# Patient Record
Sex: FEMALE | Race: WHITE | Employment: OTHER | URBAN - NONMETROPOLITAN AREA
[De-identification: names, ages, dates, MRNs, and addresses within clinical notes are randomized per-mention and may not be internally consistent; named-entity substitution may affect disease eponyms.]

---

## 2017-01-20 DIAGNOSIS — N39.46 MIXED INCONTINENCE: ICD-10-CM

## 2017-01-22 RX ORDER — OXYBUTYNIN CHLORIDE 5 MG/1
TABLET ORAL
Qty: 90 TABLET | Refills: 0 | Status: SHIPPED | OUTPATIENT
Start: 2017-01-22 | End: 2017-02-22 | Stop reason: SDUPTHER

## 2017-01-24 ENCOUNTER — TELEPHONE (OUTPATIENT)
Dept: FAMILY MEDICINE CLINIC | Age: 72
End: 2017-01-24

## 2017-01-24 ENCOUNTER — NURSE ONLY (OUTPATIENT)
Dept: FAMILY MEDICINE CLINIC | Age: 72
End: 2017-01-24

## 2017-01-24 DIAGNOSIS — R39.9 UTI SYMPTOMS: Primary | ICD-10-CM

## 2017-01-24 DIAGNOSIS — Z13.220 LIPID SCREENING: ICD-10-CM

## 2017-01-24 DIAGNOSIS — R73.9 HYPERGLYCEMIA: ICD-10-CM

## 2017-01-24 DIAGNOSIS — R53.83 FATIGUE, UNSPECIFIED TYPE: ICD-10-CM

## 2017-01-24 DIAGNOSIS — Z13.21 ENCOUNTER FOR VITAMIN DEFICIENCY SCREENING: ICD-10-CM

## 2017-01-24 LAB
BILIRUBIN, POC: NEGATIVE
BLOOD URINE, POC: NORMAL
CLARITY, POC: NORMAL
COLOR, POC: YELLOW
GLUCOSE URINE, POC: NEGATIVE
KETONES, POC: NEGATIVE
LEUKOCYTE EST, POC: NEGATIVE
NITRITE, POC: NEGATIVE
PH, POC: 5.5
PROTEIN, POC: NEGATIVE
SPECIFIC GRAVITY, POC: 1.02
UROBILINOGEN, POC: 0.2

## 2017-01-24 PROCEDURE — 36415 COLL VENOUS BLD VENIPUNCTURE: CPT | Performed by: NURSE PRACTITIONER

## 2017-01-24 PROCEDURE — 81002 URINALYSIS NONAUTO W/O SCOPE: CPT | Performed by: NURSE PRACTITIONER

## 2017-01-25 LAB
A/G RATIO: 1.5 (ref 1.1–2.2)
ALBUMIN SERPL-MCNC: 4 G/DL (ref 3.4–5)
ALP BLD-CCNC: 118 U/L (ref 40–129)
ALT SERPL-CCNC: 10 U/L (ref 10–40)
ANION GAP SERPL CALCULATED.3IONS-SCNC: 13 MMOL/L (ref 3–16)
AST SERPL-CCNC: 14 U/L (ref 15–37)
BASOPHILS ABSOLUTE: 0 K/UL (ref 0–0.2)
BASOPHILS RELATIVE PERCENT: 0.4 %
BILIRUB SERPL-MCNC: 0.3 MG/DL (ref 0–1)
BUN BLDV-MCNC: 22 MG/DL (ref 7–20)
CALCIUM SERPL-MCNC: 9.5 MG/DL (ref 8.3–10.6)
CHLORIDE BLD-SCNC: 101 MMOL/L (ref 99–110)
CHOLESTEROL, TOTAL: 266 MG/DL (ref 0–199)
CO2: 24 MMOL/L (ref 21–32)
CREAT SERPL-MCNC: 0.8 MG/DL (ref 0.6–1.2)
EOSINOPHILS ABSOLUTE: 0.1 K/UL (ref 0–0.6)
EOSINOPHILS RELATIVE PERCENT: 1.9 %
ESTIMATED AVERAGE GLUCOSE: 137 MG/DL
FOLATE: 6.7 NG/ML (ref 4.78–24.2)
GFR AFRICAN AMERICAN: >60
GFR NON-AFRICAN AMERICAN: >60
GLOBULIN: 2.6 G/DL
GLUCOSE BLD-MCNC: 100 MG/DL (ref 70–99)
HBA1C MFR BLD: 6.4 %
HCT VFR BLD CALC: 33.4 % (ref 36–48)
HDLC SERPL-MCNC: 45 MG/DL (ref 40–60)
HEMOGLOBIN: 10.3 G/DL (ref 12–16)
LDL CHOLESTEROL CALCULATED: 179 MG/DL
LYMPHOCYTES ABSOLUTE: 2.6 K/UL (ref 1–5.1)
LYMPHOCYTES RELATIVE PERCENT: 39.8 %
MCH RBC QN AUTO: 23.5 PG (ref 26–34)
MCHC RBC AUTO-ENTMCNC: 30.8 G/DL (ref 31–36)
MCV RBC AUTO: 76.2 FL (ref 80–100)
MONOCYTES ABSOLUTE: 0.6 K/UL (ref 0–1.3)
MONOCYTES RELATIVE PERCENT: 9.1 %
NEUTROPHILS ABSOLUTE: 3.1 K/UL (ref 1.7–7.7)
NEUTROPHILS RELATIVE PERCENT: 48.8 %
PDW BLD-RTO: 17.3 % (ref 12.4–15.4)
PLATELET # BLD: 263 K/UL (ref 135–450)
PMV BLD AUTO: 8 FL (ref 5–10.5)
POTASSIUM SERPL-SCNC: 4.3 MMOL/L (ref 3.5–5.1)
RBC # BLD: 4.39 M/UL (ref 4–5.2)
SODIUM BLD-SCNC: 138 MMOL/L (ref 136–145)
TOTAL PROTEIN: 6.6 G/DL (ref 6.4–8.2)
TRIGL SERPL-MCNC: 208 MG/DL (ref 0–150)
TSH REFLEX: 1.17 UIU/ML (ref 0.27–4.2)
VITAMIN B-12: 470 PG/ML (ref 211–911)
VITAMIN D 25-HYDROXY: 21.9 NG/ML
VLDLC SERPL CALC-MCNC: 42 MG/DL
WBC # BLD: 6.4 K/UL (ref 4–11)

## 2017-01-26 DIAGNOSIS — E78.00 PURE HYPERCHOLESTEROLEMIA: ICD-10-CM

## 2017-01-26 DIAGNOSIS — E55.9 VITAMIN D DEFICIENCY: Primary | ICD-10-CM

## 2017-01-26 DIAGNOSIS — D64.9 ANEMIA, UNSPECIFIED TYPE: Primary | ICD-10-CM

## 2017-01-26 LAB
FERRITIN: 8.4 NG/ML (ref 15–150)
IRON SATURATION: 7 % (ref 15–50)
IRON: 30 UG/DL (ref 37–145)
TOTAL IRON BINDING CAPACITY: 437 UG/DL (ref 260–445)

## 2017-01-26 RX ORDER — SIMVASTATIN 20 MG
20 TABLET ORAL EVERY EVENING
Qty: 30 TABLET | Refills: 2 | Status: SHIPPED | OUTPATIENT
Start: 2017-01-26 | End: 2017-04-29 | Stop reason: SDUPTHER

## 2017-01-27 DIAGNOSIS — N39.0 URINARY TRACT INFECTION WITHOUT HEMATURIA, SITE UNSPECIFIED: Primary | ICD-10-CM

## 2017-01-27 LAB
ORGANISM: ABNORMAL
URINE CULTURE, ROUTINE: ABNORMAL
URINE CULTURE, ROUTINE: ABNORMAL

## 2017-01-27 RX ORDER — SULFAMETHOXAZOLE AND TRIMETHOPRIM 800; 160 MG/1; MG/1
TABLET ORAL
Qty: 10 TABLET | Refills: 0 | Status: SHIPPED | OUTPATIENT
Start: 2017-01-27 | End: 2017-06-12 | Stop reason: ALTCHOICE

## 2017-02-22 DIAGNOSIS — N39.46 MIXED INCONTINENCE: ICD-10-CM

## 2017-02-22 RX ORDER — OXYBUTYNIN CHLORIDE 5 MG/1
TABLET ORAL
Qty: 90 TABLET | Refills: 11 | Status: SHIPPED | OUTPATIENT
Start: 2017-02-22 | End: 2017-07-10 | Stop reason: SDUPTHER

## 2017-02-28 DIAGNOSIS — K21.9 GASTROESOPHAGEAL REFLUX DISEASE WITHOUT ESOPHAGITIS: ICD-10-CM

## 2017-02-28 RX ORDER — OMEPRAZOLE 40 MG/1
CAPSULE, DELAYED RELEASE ORAL
Qty: 30 CAPSULE | Refills: 5 | Status: SHIPPED | OUTPATIENT
Start: 2017-02-28 | End: 2017-07-10 | Stop reason: SDUPTHER

## 2017-04-29 DIAGNOSIS — E78.00 PURE HYPERCHOLESTEROLEMIA: ICD-10-CM

## 2017-04-29 DIAGNOSIS — F41.9 ANXIETY: ICD-10-CM

## 2017-05-01 RX ORDER — FLUOXETINE HYDROCHLORIDE 40 MG/1
CAPSULE ORAL
Qty: 30 CAPSULE | Refills: 0 | Status: SHIPPED | OUTPATIENT
Start: 2017-05-01 | End: 2017-05-03 | Stop reason: SDUPTHER

## 2017-05-01 RX ORDER — SIMVASTATIN 20 MG
TABLET ORAL
Qty: 30 TABLET | Refills: 0 | Status: SHIPPED | OUTPATIENT
Start: 2017-05-01 | End: 2017-05-03 | Stop reason: SDUPTHER

## 2017-05-03 DIAGNOSIS — E78.00 PURE HYPERCHOLESTEROLEMIA: ICD-10-CM

## 2017-05-03 DIAGNOSIS — F41.9 ANXIETY: ICD-10-CM

## 2017-05-03 RX ORDER — FLUOXETINE HYDROCHLORIDE 40 MG/1
CAPSULE ORAL
Qty: 30 CAPSULE | Refills: 0 | Status: SHIPPED | OUTPATIENT
Start: 2017-05-03 | End: 2017-05-05 | Stop reason: SDUPTHER

## 2017-05-03 RX ORDER — SIMVASTATIN 20 MG
TABLET ORAL
Qty: 30 TABLET | Refills: 0 | Status: SHIPPED | OUTPATIENT
Start: 2017-05-03 | End: 2017-05-05 | Stop reason: SDUPTHER

## 2017-05-05 DIAGNOSIS — E78.00 PURE HYPERCHOLESTEROLEMIA: ICD-10-CM

## 2017-05-05 DIAGNOSIS — F41.9 ANXIETY: ICD-10-CM

## 2017-05-05 RX ORDER — FLUOXETINE HYDROCHLORIDE 40 MG/1
40 CAPSULE ORAL DAILY
Qty: 30 CAPSULE | Refills: 0 | Status: SHIPPED | OUTPATIENT
Start: 2017-05-05 | End: 2017-07-11 | Stop reason: DRUGHIGH

## 2017-05-05 RX ORDER — SIMVASTATIN 20 MG
20 TABLET ORAL NIGHTLY
Qty: 30 TABLET | Refills: 0 | Status: SHIPPED | OUTPATIENT
Start: 2017-05-05 | End: 2017-07-10 | Stop reason: ALTCHOICE

## 2017-06-12 ENCOUNTER — OFFICE VISIT (OUTPATIENT)
Dept: FAMILY MEDICINE CLINIC | Age: 72
End: 2017-06-12

## 2017-06-12 VITALS
DIASTOLIC BLOOD PRESSURE: 76 MMHG | SYSTOLIC BLOOD PRESSURE: 130 MMHG | HEIGHT: 69 IN | RESPIRATION RATE: 12 BRPM | TEMPERATURE: 98.8 F | HEART RATE: 68 BPM | WEIGHT: 145 LBS | BODY MASS INDEX: 21.48 KG/M2

## 2017-06-12 DIAGNOSIS — I10 ESSENTIAL HYPERTENSION: Primary | ICD-10-CM

## 2017-06-12 DIAGNOSIS — N39.46 MIXED INCONTINENCE: ICD-10-CM

## 2017-06-12 DIAGNOSIS — M54.50 CHRONIC MIDLINE LOW BACK PAIN WITHOUT SCIATICA: ICD-10-CM

## 2017-06-12 DIAGNOSIS — R73.02 IMPAIRED GLUCOSE TOLERANCE: ICD-10-CM

## 2017-06-12 DIAGNOSIS — R30.0 DYSURIA: ICD-10-CM

## 2017-06-12 DIAGNOSIS — K21.9 GASTROESOPHAGEAL REFLUX DISEASE WITHOUT ESOPHAGITIS: ICD-10-CM

## 2017-06-12 DIAGNOSIS — E78.00 PURE HYPERCHOLESTEROLEMIA: ICD-10-CM

## 2017-06-12 DIAGNOSIS — F41.9 ANXIETY: ICD-10-CM

## 2017-06-12 DIAGNOSIS — D50.8 OTHER IRON DEFICIENCY ANEMIA: ICD-10-CM

## 2017-06-12 DIAGNOSIS — G89.29 CHRONIC MIDLINE LOW BACK PAIN WITHOUT SCIATICA: ICD-10-CM

## 2017-06-12 DIAGNOSIS — I48.0 PAROXYSMAL ATRIAL FIBRILLATION (HCC): ICD-10-CM

## 2017-06-12 LAB
BILIRUBIN, POC: ABNORMAL
BLOOD URINE, POC: ABNORMAL
CLARITY, POC: ABNORMAL
COLOR, POC: YELLOW
GLUCOSE URINE, POC: ABNORMAL
KETONES, POC: ABNORMAL
LEUKOCYTE EST, POC: ABNORMAL
NITRITE, POC: POSITIVE
PH, POC: 5.5
PROTEIN, POC: 100
SPECIFIC GRAVITY, POC: >1.03
UROBILINOGEN, POC: 0.2

## 2017-06-12 PROCEDURE — 4040F PNEUMOC VAC/ADMIN/RCVD: CPT | Performed by: FAMILY MEDICINE

## 2017-06-12 PROCEDURE — 1036F TOBACCO NON-USER: CPT | Performed by: FAMILY MEDICINE

## 2017-06-12 PROCEDURE — 3014F SCREEN MAMMO DOC REV: CPT | Performed by: FAMILY MEDICINE

## 2017-06-12 PROCEDURE — 1123F ACP DISCUSS/DSCN MKR DOCD: CPT | Performed by: FAMILY MEDICINE

## 2017-06-12 PROCEDURE — 81002 URINALYSIS NONAUTO W/O SCOPE: CPT | Performed by: FAMILY MEDICINE

## 2017-06-12 PROCEDURE — 3017F COLORECTAL CA SCREEN DOC REV: CPT | Performed by: FAMILY MEDICINE

## 2017-06-12 PROCEDURE — G8420 CALC BMI NORM PARAMETERS: HCPCS | Performed by: FAMILY MEDICINE

## 2017-06-12 PROCEDURE — G8427 DOCREV CUR MEDS BY ELIG CLIN: HCPCS | Performed by: FAMILY MEDICINE

## 2017-06-12 PROCEDURE — 36415 COLL VENOUS BLD VENIPUNCTURE: CPT | Performed by: FAMILY MEDICINE

## 2017-06-12 PROCEDURE — G8400 PT W/DXA NO RESULTS DOC: HCPCS | Performed by: FAMILY MEDICINE

## 2017-06-12 PROCEDURE — 0509F URINE INCON PLAN DOCD: CPT | Performed by: FAMILY MEDICINE

## 2017-06-12 PROCEDURE — 99215 OFFICE O/P EST HI 40 MIN: CPT | Performed by: FAMILY MEDICINE

## 2017-06-12 PROCEDURE — 1090F PRES/ABSN URINE INCON ASSESS: CPT | Performed by: FAMILY MEDICINE

## 2017-06-12 RX ORDER — APIXABAN 5 MG/1
5 TABLET, FILM COATED ORAL 2 TIMES DAILY
Qty: 60 TABLET | Refills: 5 | Status: SHIPPED | OUTPATIENT
Start: 2017-06-12

## 2017-06-12 RX ORDER — TRAMADOL HYDROCHLORIDE 50 MG/1
50 TABLET ORAL EVERY 8 HOURS PRN
Qty: 20 TABLET | Refills: 0 | Status: SHIPPED | OUTPATIENT
Start: 2017-06-12 | End: 2017-07-10 | Stop reason: SDUPTHER

## 2017-06-12 RX ORDER — FLUOXETINE HYDROCHLORIDE 40 MG/1
40 CAPSULE ORAL DAILY
Qty: 30 CAPSULE | Refills: 0 | Status: CANCELLED | OUTPATIENT
Start: 2017-06-12

## 2017-06-12 RX ORDER — SIMVASTATIN 20 MG
20 TABLET ORAL NIGHTLY
Qty: 30 TABLET | Refills: 0 | Status: CANCELLED | OUTPATIENT
Start: 2017-06-12

## 2017-06-12 RX ORDER — OMEPRAZOLE 40 MG/1
CAPSULE, DELAYED RELEASE ORAL
Qty: 30 CAPSULE | Refills: 5 | Status: CANCELLED | OUTPATIENT
Start: 2017-06-12

## 2017-06-12 RX ORDER — CIPROFLOXACIN 250 MG/1
250 TABLET, FILM COATED ORAL 2 TIMES DAILY
Qty: 6 TABLET | Refills: 0 | Status: SHIPPED | OUTPATIENT
Start: 2017-06-12 | End: 2017-06-22

## 2017-06-12 RX ORDER — OXYBUTYNIN CHLORIDE 5 MG/1
TABLET ORAL
Qty: 90 TABLET | Refills: 11 | Status: CANCELLED | OUTPATIENT
Start: 2017-06-12

## 2017-06-13 ENCOUNTER — NURSE ONLY (OUTPATIENT)
Dept: FAMILY MEDICINE CLINIC | Age: 72
End: 2017-06-13

## 2017-06-13 DIAGNOSIS — R30.0 DYSURIA: ICD-10-CM

## 2017-06-13 DIAGNOSIS — N39.46 MIXED INCONTINENCE: ICD-10-CM

## 2017-06-13 LAB
A/G RATIO: 1.5 (ref 1.1–2.2)
ALBUMIN SERPL-MCNC: 4.2 G/DL (ref 3.4–5)
ALP BLD-CCNC: 130 U/L (ref 40–129)
ALT SERPL-CCNC: 14 U/L (ref 10–40)
ANION GAP SERPL CALCULATED.3IONS-SCNC: 17 MMOL/L (ref 3–16)
AST SERPL-CCNC: 17 U/L (ref 15–37)
BILIRUB SERPL-MCNC: 0.8 MG/DL (ref 0–1)
BUN BLDV-MCNC: 20 MG/DL (ref 7–20)
CALCIUM SERPL-MCNC: 9.6 MG/DL (ref 8.3–10.6)
CHLORIDE BLD-SCNC: 93 MMOL/L (ref 99–110)
CHOLESTEROL, TOTAL: 213 MG/DL (ref 0–199)
CO2: 24 MMOL/L (ref 21–32)
CREAT SERPL-MCNC: 1.5 MG/DL (ref 0.6–1.2)
ESTIMATED AVERAGE GLUCOSE: 137 MG/DL
GFR AFRICAN AMERICAN: 42
GFR NON-AFRICAN AMERICAN: 34
GLOBULIN: 2.8 G/DL
GLUCOSE BLD-MCNC: 116 MG/DL (ref 70–99)
HBA1C MFR BLD: 6.4 %
HCT VFR BLD CALC: 40.9 % (ref 36–48)
HDLC SERPL-MCNC: 48 MG/DL (ref 40–60)
HEMOGLOBIN: 13.2 G/DL (ref 12–16)
LDL CHOLESTEROL CALCULATED: 129 MG/DL
MCH RBC QN AUTO: 29.4 PG (ref 26–34)
MCHC RBC AUTO-ENTMCNC: 32.2 G/DL (ref 31–36)
MCV RBC AUTO: 91.2 FL (ref 80–100)
PDW BLD-RTO: 14.2 % (ref 12.4–15.4)
PLATELET # BLD: 221 K/UL (ref 135–450)
PMV BLD AUTO: 8.7 FL (ref 5–10.5)
POTASSIUM SERPL-SCNC: 4.6 MMOL/L (ref 3.5–5.1)
RBC # BLD: 4.49 M/UL (ref 4–5.2)
SODIUM BLD-SCNC: 134 MMOL/L (ref 136–145)
TOTAL PROTEIN: 7 G/DL (ref 6.4–8.2)
TRIGL SERPL-MCNC: 178 MG/DL (ref 0–150)
VLDLC SERPL CALC-MCNC: 36 MG/DL
WBC # BLD: 9.4 K/UL (ref 4–11)

## 2017-06-15 LAB
ORGANISM: ABNORMAL
URINE CULTURE, ROUTINE: ABNORMAL

## 2017-06-18 ASSESSMENT — ENCOUNTER SYMPTOMS
RESPIRATORY NEGATIVE: 1
GASTROINTESTINAL NEGATIVE: 1

## 2017-06-28 ENCOUNTER — TELEPHONE (OUTPATIENT)
Dept: FAMILY MEDICINE CLINIC | Age: 72
End: 2017-06-28

## 2017-07-10 ENCOUNTER — OFFICE VISIT (OUTPATIENT)
Dept: FAMILY MEDICINE CLINIC | Age: 72
End: 2017-07-10

## 2017-07-10 VITALS
WEIGHT: 142 LBS | BODY MASS INDEX: 20.97 KG/M2 | SYSTOLIC BLOOD PRESSURE: 132 MMHG | OXYGEN SATURATION: 97 % | HEART RATE: 72 BPM | DIASTOLIC BLOOD PRESSURE: 84 MMHG

## 2017-07-10 DIAGNOSIS — K52.9 CHRONIC DIARRHEA: ICD-10-CM

## 2017-07-10 DIAGNOSIS — M54.50 CHRONIC MIDLINE LOW BACK PAIN WITHOUT SCIATICA: ICD-10-CM

## 2017-07-10 DIAGNOSIS — E78.00 PURE HYPERCHOLESTEROLEMIA: ICD-10-CM

## 2017-07-10 DIAGNOSIS — F41.9 ANXIETY: ICD-10-CM

## 2017-07-10 DIAGNOSIS — N39.46 MIXED INCONTINENCE: ICD-10-CM

## 2017-07-10 DIAGNOSIS — K21.9 GASTROESOPHAGEAL REFLUX DISEASE WITHOUT ESOPHAGITIS: ICD-10-CM

## 2017-07-10 DIAGNOSIS — G89.29 CHRONIC MIDLINE LOW BACK PAIN WITHOUT SCIATICA: ICD-10-CM

## 2017-07-10 DIAGNOSIS — D50.8 OTHER IRON DEFICIENCY ANEMIA: Primary | ICD-10-CM

## 2017-07-10 PROCEDURE — 3014F SCREEN MAMMO DOC REV: CPT | Performed by: FAMILY MEDICINE

## 2017-07-10 PROCEDURE — 99214 OFFICE O/P EST MOD 30 MIN: CPT | Performed by: FAMILY MEDICINE

## 2017-07-10 PROCEDURE — 0509F URINE INCON PLAN DOCD: CPT | Performed by: FAMILY MEDICINE

## 2017-07-10 PROCEDURE — 1036F TOBACCO NON-USER: CPT | Performed by: FAMILY MEDICINE

## 2017-07-10 PROCEDURE — 3017F COLORECTAL CA SCREEN DOC REV: CPT | Performed by: FAMILY MEDICINE

## 2017-07-10 PROCEDURE — G8400 PT W/DXA NO RESULTS DOC: HCPCS | Performed by: FAMILY MEDICINE

## 2017-07-10 PROCEDURE — 1090F PRES/ABSN URINE INCON ASSESS: CPT | Performed by: FAMILY MEDICINE

## 2017-07-10 PROCEDURE — G8420 CALC BMI NORM PARAMETERS: HCPCS | Performed by: FAMILY MEDICINE

## 2017-07-10 PROCEDURE — 1123F ACP DISCUSS/DSCN MKR DOCD: CPT | Performed by: FAMILY MEDICINE

## 2017-07-10 PROCEDURE — G8427 DOCREV CUR MEDS BY ELIG CLIN: HCPCS | Performed by: FAMILY MEDICINE

## 2017-07-10 PROCEDURE — 4040F PNEUMOC VAC/ADMIN/RCVD: CPT | Performed by: FAMILY MEDICINE

## 2017-07-10 RX ORDER — TRAMADOL HYDROCHLORIDE 50 MG/1
50 TABLET ORAL EVERY 8 HOURS PRN
Qty: 30 TABLET | Refills: 0 | Status: SHIPPED | OUTPATIENT
Start: 2017-07-10 | End: 2017-09-23 | Stop reason: SDUPTHER

## 2017-07-10 RX ORDER — FLUOXETINE HYDROCHLORIDE 40 MG/1
40 CAPSULE ORAL DAILY
Qty: 30 CAPSULE | Refills: 5 | Status: CANCELLED | OUTPATIENT
Start: 2017-07-10

## 2017-07-10 RX ORDER — ATORVASTATIN CALCIUM 40 MG/1
40 TABLET, FILM COATED ORAL DAILY
Qty: 30 TABLET | Refills: 5 | Status: SHIPPED | OUTPATIENT
Start: 2017-07-10 | End: 2018-01-19 | Stop reason: SDUPTHER

## 2017-07-11 RX ORDER — OXYBUTYNIN CHLORIDE 5 MG/1
TABLET ORAL
Qty: 90 TABLET | Refills: 5 | Status: SHIPPED | OUTPATIENT
Start: 2017-07-11 | End: 2018-03-19 | Stop reason: SDUPTHER

## 2017-07-11 RX ORDER — OMEPRAZOLE 40 MG/1
CAPSULE, DELAYED RELEASE ORAL
Qty: 30 CAPSULE | Refills: 5 | Status: SHIPPED | OUTPATIENT
Start: 2017-07-11 | End: 2017-08-16 | Stop reason: ALTCHOICE

## 2017-07-11 RX ORDER — FLUOXETINE HYDROCHLORIDE 40 MG/1
40 CAPSULE ORAL DAILY
Qty: 30 CAPSULE | Refills: 5 | Status: SHIPPED | OUTPATIENT
Start: 2017-07-11 | End: 2018-02-05 | Stop reason: SDUPTHER

## 2017-07-12 ENCOUNTER — NURSE ONLY (OUTPATIENT)
Dept: FAMILY MEDICINE CLINIC | Age: 72
End: 2017-07-12

## 2017-07-12 DIAGNOSIS — K52.9 CHRONIC DIARRHEA OF UNKNOWN ORIGIN: Primary | ICD-10-CM

## 2017-07-12 LAB
6-ACETYLMORPHINE: NOT DETECTED
7-AMINOCLONAZEPAM: NOT DETECTED
ALPHA-OH-ALPRAZOLAM: NOT DETECTED
ALPRAZOLAM: NOT DETECTED
AMPHETAMINE: NOT DETECTED
BARBITURATES: NOT DETECTED
BENZOYLECGONINE: NOT DETECTED
BUPRENORPHINE: NOT DETECTED
CARISOPRODOL: NOT DETECTED
CLONAZEPAM: NOT DETECTED
CODEINE: NOT DETECTED
CREATININE URINE: 60.6 MG/DL (ref 20–400)
DIAZEPAM: NOT DETECTED
EER PAIN MGT DRUG PANEL, HIGH RES/EMIT U: NORMAL
ETHYL GLUCURONIDE: NOT DETECTED
FENTANYL: NOT DETECTED
GLIADIN ANTIBODIES IGA: 6 UNITS (ref 0–19)
GLIADIN ANTIBODIES IGG: 5 UNITS (ref 0–19)
HYDROCODONE: NOT DETECTED
HYDROMORPHONE: NOT DETECTED
LORAZEPAM: NOT DETECTED
MARIJUANA METABOLITE: NOT DETECTED
MDA: NOT DETECTED
MDEA: NOT DETECTED
MDMA URINE: NOT DETECTED
MEPERIDINE: NOT DETECTED
METHADONE: NOT DETECTED
METHAMPHETAMINE: NOT DETECTED
METHYLPHENIDATE: NOT DETECTED
MIDAZOLAM: NOT DETECTED
MORPHINE: NOT DETECTED
NORBUPRENORPHINE, FREE: NOT DETECTED
NORDIAZEPAM: NOT DETECTED
NORFENTANYL: NOT DETECTED
NORHYDROCODONE, URINE: NOT DETECTED
NOROXYCODONE: NOT DETECTED
NOROXYMORPHONE, URINE: NOT DETECTED
OCCULT BLOOD DIAGNOSTIC: NORMAL
OXAZEPAM: NOT DETECTED
OXYCODONE: NOT DETECTED
OXYMORPHONE: NOT DETECTED
PAIN MANAGEMENT DRUG PANEL: NORMAL
PCP: NOT DETECTED
PHENTERMINE: NOT DETECTED
PROPOXYPHENE: NOT DETECTED
TAPENTADOL, URINE: NOT DETECTED
TAPENTADOL-O-SULFATE, URINE: NOT DETECTED
TEMAZEPAM: NOT DETECTED
TRAMADOL: PRESENT
ZOLPIDEM: NOT DETECTED

## 2017-07-13 LAB
C DIFFICILE TOXIN, EIA: NORMAL
CRYPTOSPORIDIUM ANTIGEN STOOL: NORMAL
E HISTOLYTICA ANTIGEN STOOL: NORMAL
GIARDIA ANTIGEN STOOL: NORMAL

## 2017-07-14 LAB — CALPROTECTIN: <16 UG/G

## 2017-07-21 ENCOUNTER — HOSPITAL ENCOUNTER (OUTPATIENT)
Dept: MAMMOGRAPHY | Age: 72
Discharge: HOME | End: 2017-07-21
Attending: FAMILY MEDICINE | Admitting: FAMILY MEDICINE

## 2017-07-21 DIAGNOSIS — Z12.31 VISIT FOR SCREENING MAMMOGRAM: ICD-10-CM

## 2017-07-24 ENCOUNTER — TELEPHONE (OUTPATIENT)
Dept: FAMILY MEDICINE CLINIC | Age: 72
End: 2017-07-24

## 2017-07-25 ENCOUNTER — TELEPHONE (OUTPATIENT)
Dept: FAMILY MEDICINE CLINIC | Age: 72
End: 2017-07-25

## 2017-07-25 DIAGNOSIS — R92.8 ABNORMAL MAMMOGRAM OF LEFT BREAST: Primary | ICD-10-CM

## 2017-07-26 ENCOUNTER — TELEPHONE (OUTPATIENT)
Dept: FAMILY MEDICINE CLINIC | Age: 72
End: 2017-07-26

## 2017-07-26 DIAGNOSIS — R92.8 ABNORMAL MAMMOGRAM OF LEFT BREAST: Primary | ICD-10-CM

## 2017-08-01 DIAGNOSIS — R92.8 ABNORMALITY OF LEFT BREAST ON SCREENING MAMMOGRAM: Primary | ICD-10-CM

## 2017-08-04 ENCOUNTER — HOSPITAL ENCOUNTER (OUTPATIENT)
Dept: ULTRASOUND IMAGING | Age: 72
Discharge: HOME | End: 2017-08-04
Attending: FAMILY MEDICINE | Admitting: FAMILY MEDICINE

## 2017-08-04 DIAGNOSIS — R92.8 ABNORMALITY OF LEFT BREAST ON SCREENING MAMMOGRAM: ICD-10-CM

## 2017-08-04 DIAGNOSIS — R92.8 OTHER ABNORMAL AND INCONCLUSIVE FINDINGS ON DIAGNOSTIC IMAGING OF BREAST: ICD-10-CM

## 2017-08-16 ENCOUNTER — HOSPITAL ENCOUNTER (OUTPATIENT)
Dept: OTHER | Age: 72
Discharge: HOME | End: 2017-08-16
Attending: INTERNAL MEDICINE | Admitting: INTERNAL MEDICINE

## 2017-08-16 VITALS
OXYGEN SATURATION: 96 % | SYSTOLIC BLOOD PRESSURE: 130 MMHG | HEART RATE: 65 BPM | DIASTOLIC BLOOD PRESSURE: 72 MMHG | RESPIRATION RATE: 16 BRPM | HEIGHT: 70 IN | WEIGHT: 145 LBS | TEMPERATURE: 97.9 F | BODY MASS INDEX: 20.76 KG/M2

## 2017-08-16 RX ORDER — SODIUM CHLORIDE, SODIUM LACTATE, POTASSIUM CHLORIDE, CALCIUM CHLORIDE 600; 310; 30; 20 MG/100ML; MG/100ML; MG/100ML; MG/100ML
INJECTION, SOLUTION INTRAVENOUS CONTINUOUS
Status: DISCONTINUED | OUTPATIENT
Start: 2017-08-16 | End: 2017-08-17 | Stop reason: HOSPADM

## 2017-08-16 RX ORDER — PANTOPRAZOLE SODIUM 40 MG/1
40 TABLET, DELAYED RELEASE ORAL DAILY
COMMUNITY
End: 2017-10-02 | Stop reason: ALTCHOICE

## 2017-08-16 RX ADMIN — SODIUM CHLORIDE, SODIUM LACTATE, POTASSIUM CHLORIDE, CALCIUM CHLORIDE: 600; 310; 30; 20 INJECTION, SOLUTION INTRAVENOUS at 08:12

## 2017-08-16 ASSESSMENT — PAIN SCALES - GENERAL
PAINLEVEL_OUTOF10: 0
PAINLEVEL_OUTOF10: 0

## 2017-08-16 ASSESSMENT — PAIN - FUNCTIONAL ASSESSMENT: PAIN_FUNCTIONAL_ASSESSMENT: 0-10

## 2017-09-23 DIAGNOSIS — G89.29 CHRONIC MIDLINE LOW BACK PAIN WITHOUT SCIATICA: ICD-10-CM

## 2017-09-23 DIAGNOSIS — M54.50 CHRONIC MIDLINE LOW BACK PAIN WITHOUT SCIATICA: ICD-10-CM

## 2017-09-26 RX ORDER — TRAMADOL HYDROCHLORIDE 50 MG/1
TABLET ORAL
Qty: 30 TABLET | Refills: 0 | Status: SHIPPED | OUTPATIENT
Start: 2017-09-26 | End: 2018-01-26 | Stop reason: SDUPTHER

## 2017-10-02 ENCOUNTER — OFFICE VISIT (OUTPATIENT)
Dept: FAMILY MEDICINE CLINIC | Age: 72
End: 2017-10-02

## 2017-10-02 VITALS
HEART RATE: 82 BPM | BODY MASS INDEX: 20.44 KG/M2 | TEMPERATURE: 98.1 F | DIASTOLIC BLOOD PRESSURE: 56 MMHG | WEIGHT: 138 LBS | HEIGHT: 69 IN | OXYGEN SATURATION: 96 % | SYSTOLIC BLOOD PRESSURE: 120 MMHG

## 2017-10-02 DIAGNOSIS — K21.9 GASTROESOPHAGEAL REFLUX DISEASE WITHOUT ESOPHAGITIS: ICD-10-CM

## 2017-10-02 DIAGNOSIS — D50.9 IRON DEFICIENCY ANEMIA, UNSPECIFIED IRON DEFICIENCY ANEMIA TYPE: ICD-10-CM

## 2017-10-02 DIAGNOSIS — M51.36 DEGENERATIVE DISC DISEASE, LUMBAR: Primary | ICD-10-CM

## 2017-10-02 LAB
HCT VFR BLD CALC: 39.7 % (ref 36–48)
HEMOGLOBIN: 13.1 G/DL (ref 12–16)
MCH RBC QN AUTO: 30.6 PG (ref 26–34)
MCHC RBC AUTO-ENTMCNC: 32.9 G/DL (ref 31–36)
MCV RBC AUTO: 93 FL (ref 80–100)
PDW BLD-RTO: 13.9 % (ref 12.4–15.4)
PLATELET # BLD: 192 K/UL (ref 135–450)
PMV BLD AUTO: 8.5 FL (ref 5–10.5)
RBC # BLD: 4.27 M/UL (ref 4–5.2)
WBC # BLD: 6.6 K/UL (ref 4–11)

## 2017-10-02 PROCEDURE — 90662 IIV NO PRSV INCREASED AG IM: CPT | Performed by: FAMILY MEDICINE

## 2017-10-02 PROCEDURE — G8400 PT W/DXA NO RESULTS DOC: HCPCS | Performed by: FAMILY MEDICINE

## 2017-10-02 PROCEDURE — 90471 IMMUNIZATION ADMIN: CPT | Performed by: FAMILY MEDICINE

## 2017-10-02 PROCEDURE — 3017F COLORECTAL CA SCREEN DOC REV: CPT | Performed by: FAMILY MEDICINE

## 2017-10-02 PROCEDURE — 4040F PNEUMOC VAC/ADMIN/RCVD: CPT | Performed by: FAMILY MEDICINE

## 2017-10-02 PROCEDURE — G8484 FLU IMMUNIZE NO ADMIN: HCPCS | Performed by: FAMILY MEDICINE

## 2017-10-02 PROCEDURE — 99213 OFFICE O/P EST LOW 20 MIN: CPT | Performed by: FAMILY MEDICINE

## 2017-10-02 PROCEDURE — G8420 CALC BMI NORM PARAMETERS: HCPCS | Performed by: FAMILY MEDICINE

## 2017-10-02 PROCEDURE — 1123F ACP DISCUSS/DSCN MKR DOCD: CPT | Performed by: FAMILY MEDICINE

## 2017-10-02 PROCEDURE — 36415 COLL VENOUS BLD VENIPUNCTURE: CPT | Performed by: FAMILY MEDICINE

## 2017-10-02 PROCEDURE — 1036F TOBACCO NON-USER: CPT | Performed by: FAMILY MEDICINE

## 2017-10-02 PROCEDURE — 3014F SCREEN MAMMO DOC REV: CPT | Performed by: FAMILY MEDICINE

## 2017-10-02 PROCEDURE — 1090F PRES/ABSN URINE INCON ASSESS: CPT | Performed by: FAMILY MEDICINE

## 2017-10-02 PROCEDURE — G8427 DOCREV CUR MEDS BY ELIG CLIN: HCPCS | Performed by: FAMILY MEDICINE

## 2017-10-02 RX ORDER — OMEPRAZOLE 40 MG/1
40 CAPSULE, DELAYED RELEASE ORAL DAILY
COMMUNITY
End: 2018-03-19 | Stop reason: SDUPTHER

## 2017-10-02 ASSESSMENT — PATIENT HEALTH QUESTIONNAIRE - PHQ9
SUM OF ALL RESPONSES TO PHQ9 QUESTIONS 1 & 2: 0
2. FEELING DOWN, DEPRESSED OR HOPELESS: 0
1. LITTLE INTEREST OR PLEASURE IN DOING THINGS: 0
SUM OF ALL RESPONSES TO PHQ QUESTIONS 1-9: 0

## 2017-10-02 ASSESSMENT — ENCOUNTER SYMPTOMS
RESPIRATORY NEGATIVE: 1
GASTROINTESTINAL NEGATIVE: 1

## 2017-10-02 NOTE — MR AVS SNAPSHOT
needed for Pain    oxybutynin (DITROPAN) 5 MG tablet TAKE 1 TABLET BY MOUTH 3 TIMES A DAY    FLUoxetine (PROZAC) 40 MG capsule Take 1 capsule by mouth daily    atorvastatin (LIPITOR) 40 MG tablet Take 1 tablet by mouth daily    ELIQUIS 5 MG TABS tablet Take 1 tablet by mouth 2 times daily    metoprolol (TOPROL-XL) 50 MG XL tablet Take 50 mg by mouth daily     propafenone (RYTHMOL SR) 225 MG SR capsule Take 225 mg by mouth every 8 hours       Allergies              Lisinopril Other (See Comments)    Cough    Naprosyn [Naproxen] Other (See Comments)    Oral ulcers      We Ordered/Performed the following           CBC     INFLUENZA, HIGH DOSE, 65 YRS +, IM, PF, PREFILL SYR, 0.5ML (FLUZONE HD)          Additional Information        Basic Information     Date Of Birth Sex Race Ethnicity Preferred Language    1945 Female White Non-/Non  English      Problem List as of 10/2/2017  Date Reviewed: 10/2/2017                Degenerative disc disease, lumbar    Anxiety    Paroxysmal atrial fibrillation (HCC)    Gastroesophageal reflux disease without esophagitis    Mixed incontinence    Phalanx, proximal fracture of finger    Postoperative anemia    Hyperglycemia      Immunizations as of 10/2/2017     Name Date    Pneumococcal 13-valent Conjugate ([id number]) 9/23/2015    Pneumococcal Polysaccharide ([id number]) 6/23/2009    Tdap (Boostrix, Adacel) 11/11/2014      Preventive Care        Date Due    Pneumococcal Vaccines (two) for all adults aged 72 and over (2 of 2 - PPSV23) 9/23/2016    Yearly Flu Vaccine (1) 9/1/2017    Colon Cancer Stool Test 7/12/2018    Mammograms are recommended every 2 years for low/average risk patients aged 48 - 69, and every year for high risk patients per updated national guidelines. However these guidelines can be individualized by your provider.  8/4/2019    Cholesterol Screening 6/12/2022    Tetanus Combination Vaccine (2 - Td) 11/11/2024            Aldair Harrisup

## 2018-01-19 DIAGNOSIS — E78.00 PURE HYPERCHOLESTEROLEMIA: ICD-10-CM

## 2018-01-19 RX ORDER — ATORVASTATIN CALCIUM 40 MG/1
40 TABLET, FILM COATED ORAL DAILY
Qty: 30 TABLET | Refills: 2 | Status: SHIPPED | OUTPATIENT
Start: 2018-01-19 | End: 2018-03-19 | Stop reason: SDUPTHER

## 2018-01-26 DIAGNOSIS — G89.29 CHRONIC MIDLINE LOW BACK PAIN WITHOUT SCIATICA: ICD-10-CM

## 2018-01-26 DIAGNOSIS — M54.50 CHRONIC MIDLINE LOW BACK PAIN WITHOUT SCIATICA: ICD-10-CM

## 2018-01-26 RX ORDER — TRAMADOL HYDROCHLORIDE 50 MG/1
TABLET ORAL
Qty: 30 TABLET | Refills: 0 | Status: SHIPPED | OUTPATIENT
Start: 2018-01-26 | End: 2018-07-16 | Stop reason: SDUPTHER

## 2018-03-19 ENCOUNTER — OFFICE VISIT (OUTPATIENT)
Dept: FAMILY MEDICINE CLINIC | Age: 73
End: 2018-03-19

## 2018-03-19 VITALS
SYSTOLIC BLOOD PRESSURE: 136 MMHG | WEIGHT: 149.8 LBS | DIASTOLIC BLOOD PRESSURE: 60 MMHG | HEART RATE: 63 BPM | OXYGEN SATURATION: 97 % | BODY MASS INDEX: 22.19 KG/M2 | HEIGHT: 69 IN

## 2018-03-19 DIAGNOSIS — E78.00 PURE HYPERCHOLESTEROLEMIA: ICD-10-CM

## 2018-03-19 DIAGNOSIS — N39.46 MIXED INCONTINENCE: ICD-10-CM

## 2018-03-19 DIAGNOSIS — F41.9 ANXIETY: ICD-10-CM

## 2018-03-19 DIAGNOSIS — I48.0 PAROXYSMAL ATRIAL FIBRILLATION (HCC): ICD-10-CM

## 2018-03-19 DIAGNOSIS — H61.21 IMPACTED CERUMEN OF RIGHT EAR: ICD-10-CM

## 2018-03-19 DIAGNOSIS — D50.0 IRON DEFICIENCY ANEMIA DUE TO CHRONIC BLOOD LOSS: ICD-10-CM

## 2018-03-19 DIAGNOSIS — Z86.010 HX OF ADENOMATOUS COLONIC POLYPS: ICD-10-CM

## 2018-03-19 DIAGNOSIS — R79.89 ELEVATED SERUM CREATININE: ICD-10-CM

## 2018-03-19 DIAGNOSIS — K21.9 GASTROESOPHAGEAL REFLUX DISEASE WITHOUT ESOPHAGITIS: Primary | ICD-10-CM

## 2018-03-19 PROBLEM — Z86.0101 HX OF ADENOMATOUS COLONIC POLYPS: Status: ACTIVE | Noted: 2018-03-19

## 2018-03-19 LAB
ALBUMIN SERPL-MCNC: 4.2 G/DL (ref 3.4–5)
ANION GAP SERPL CALCULATED.3IONS-SCNC: 17 MMOL/L (ref 3–16)
BUN BLDV-MCNC: 16 MG/DL (ref 7–20)
CALCIUM SERPL-MCNC: 9.5 MG/DL (ref 8.3–10.6)
CHLORIDE BLD-SCNC: 100 MMOL/L (ref 99–110)
CHOLESTEROL, TOTAL: 186 MG/DL (ref 0–199)
CO2: 24 MMOL/L (ref 21–32)
CREAT SERPL-MCNC: 0.8 MG/DL (ref 0.6–1.2)
GFR AFRICAN AMERICAN: >60
GFR NON-AFRICAN AMERICAN: >60
GLUCOSE BLD-MCNC: 124 MG/DL (ref 70–99)
HCT VFR BLD CALC: 37.9 % (ref 36–48)
HDLC SERPL-MCNC: 54 MG/DL (ref 40–60)
HEMOGLOBIN: 12.7 G/DL (ref 12–16)
LDL CHOLESTEROL CALCULATED: 106 MG/DL
MCH RBC QN AUTO: 28.7 PG (ref 26–34)
MCHC RBC AUTO-ENTMCNC: 33.5 G/DL (ref 31–36)
MCV RBC AUTO: 85.6 FL (ref 80–100)
PDW BLD-RTO: 14.2 % (ref 12.4–15.4)
PHOSPHORUS: 3.6 MG/DL (ref 2.5–4.9)
PLATELET # BLD: 260 K/UL (ref 135–450)
PMV BLD AUTO: 8.3 FL (ref 5–10.5)
POTASSIUM SERPL-SCNC: 5.1 MMOL/L (ref 3.5–5.1)
RBC # BLD: 4.42 M/UL (ref 4–5.2)
SODIUM BLD-SCNC: 141 MMOL/L (ref 136–145)
TRIGL SERPL-MCNC: 128 MG/DL (ref 0–150)
VLDLC SERPL CALC-MCNC: 26 MG/DL
WBC # BLD: 7.7 K/UL (ref 4–11)

## 2018-03-19 PROCEDURE — G8482 FLU IMMUNIZE ORDER/ADMIN: HCPCS | Performed by: FAMILY MEDICINE

## 2018-03-19 PROCEDURE — 3017F COLORECTAL CA SCREEN DOC REV: CPT | Performed by: FAMILY MEDICINE

## 2018-03-19 PROCEDURE — 99214 OFFICE O/P EST MOD 30 MIN: CPT | Performed by: FAMILY MEDICINE

## 2018-03-19 PROCEDURE — 0509F URINE INCON PLAN DOCD: CPT | Performed by: FAMILY MEDICINE

## 2018-03-19 PROCEDURE — 1123F ACP DISCUSS/DSCN MKR DOCD: CPT | Performed by: FAMILY MEDICINE

## 2018-03-19 PROCEDURE — 1036F TOBACCO NON-USER: CPT | Performed by: FAMILY MEDICINE

## 2018-03-19 PROCEDURE — G8427 DOCREV CUR MEDS BY ELIG CLIN: HCPCS | Performed by: FAMILY MEDICINE

## 2018-03-19 PROCEDURE — 36415 COLL VENOUS BLD VENIPUNCTURE: CPT | Performed by: FAMILY MEDICINE

## 2018-03-19 PROCEDURE — 4040F PNEUMOC VAC/ADMIN/RCVD: CPT | Performed by: FAMILY MEDICINE

## 2018-03-19 PROCEDURE — G8420 CALC BMI NORM PARAMETERS: HCPCS | Performed by: FAMILY MEDICINE

## 2018-03-19 PROCEDURE — 69210 REMOVE IMPACTED EAR WAX UNI: CPT | Performed by: FAMILY MEDICINE

## 2018-03-19 PROCEDURE — 1090F PRES/ABSN URINE INCON ASSESS: CPT | Performed by: FAMILY MEDICINE

## 2018-03-19 PROCEDURE — G8400 PT W/DXA NO RESULTS DOC: HCPCS | Performed by: FAMILY MEDICINE

## 2018-03-19 PROCEDURE — 3014F SCREEN MAMMO DOC REV: CPT | Performed by: FAMILY MEDICINE

## 2018-03-19 RX ORDER — OMEPRAZOLE 40 MG/1
40 CAPSULE, DELAYED RELEASE ORAL DAILY
Qty: 30 CAPSULE | Refills: 5 | Status: SHIPPED | OUTPATIENT
Start: 2018-03-19 | End: 2018-09-10 | Stop reason: SDUPTHER

## 2018-03-19 RX ORDER — FLUOXETINE HYDROCHLORIDE 40 MG/1
40 CAPSULE ORAL DAILY
Qty: 30 CAPSULE | Refills: 5 | Status: SHIPPED | OUTPATIENT
Start: 2018-03-19 | End: 2018-09-10 | Stop reason: SDUPTHER

## 2018-03-19 RX ORDER — OXYBUTYNIN CHLORIDE 5 MG/1
TABLET ORAL
Qty: 90 TABLET | Refills: 5 | Status: SHIPPED | OUTPATIENT
Start: 2018-03-19 | End: 2018-09-10 | Stop reason: SDUPTHER

## 2018-03-19 RX ORDER — ATORVASTATIN CALCIUM 40 MG/1
40 TABLET, FILM COATED ORAL DAILY
Qty: 30 TABLET | Refills: 5 | Status: SHIPPED | OUTPATIENT
Start: 2018-03-19 | End: 2018-09-10 | Stop reason: SDUPTHER

## 2018-03-19 RX ORDER — TRAMADOL HYDROCHLORIDE 50 MG/1
50 TABLET ORAL EVERY 8 HOURS PRN
COMMUNITY
End: 2018-09-10 | Stop reason: DRUGHIGH

## 2018-03-19 ASSESSMENT — ENCOUNTER SYMPTOMS
BACK PAIN: 1
BLOOD IN STOOL: 0

## 2018-03-19 NOTE — PROGRESS NOTES
sounds normal.   Musculoskeletal: She exhibits no edema. Neurological: She is alert. Skin: There is pallor. Psychiatric: She has a normal mood and affect. Nursing note and vitals reviewed. /60   Pulse 63   Ht 5' 9\" (1.753 m)   Wt 149 lb 12.8 oz (67.9 kg)   LMP  (LMP Unknown)   SpO2 97%   BMI 22.12 kg/m²     Assessment:      Saundra was seen today for hyperlipidemia, anxiety, otalgia and gastroesophageal reflux. Diagnoses and all orders for this visit:    Gastroesophageal reflux disease without esophagitis  -     omeprazole (PRILOSEC) 40 MG delayed release capsule; Take 1 capsule by mouth daily    Mixed incontinence  -     oxybutynin (DITROPAN) 5 MG tablet; TAKE 1 TABLET BY MOUTH 3 TIMES A DAY    Paroxysmal atrial fibrillation (HCC)    Pure hypercholesterolemia  -     atorvastatin (LIPITOR) 40 MG tablet; Take 1 tablet by mouth daily  -     Lipid Panel    Hx of adenomatous colonic polyps    Iron deficiency anemia due to chronic blood loss  -     CBC    Elevated serum creatinine  -     Renal Function Panel    Anxiety  -     FLUoxetine (PROZAC) 40 MG capsule; Take 1 capsule by mouth daily    Impacted cerumen of right ear  -     30773 - WA REMOVE IMPACTED EAR WAX           Plan:      Return in about 6 months (around 9/19/2018).   Patient Instructions   Contact dr Jessee Romo re colonoscopy and ba swallow

## 2018-03-20 RX ORDER — FERROUS SULFATE 325(65) MG
325 TABLET ORAL
Qty: 30 TABLET | Refills: 3 | Status: SHIPPED | OUTPATIENT
Start: 2018-03-20 | End: 2018-09-16 | Stop reason: ALTCHOICE

## 2018-04-10 RX ORDER — SODIUM CHLORIDE, SODIUM LACTATE, POTASSIUM CHLORIDE, CALCIUM CHLORIDE 600; 310; 30; 20 MG/100ML; MG/100ML; MG/100ML; MG/100ML
INJECTION, SOLUTION INTRAVENOUS ONCE
Status: CANCELLED | OUTPATIENT
Start: 2018-04-10 | End: 2018-04-10

## 2018-04-11 ENCOUNTER — HOSPITAL ENCOUNTER (OUTPATIENT)
Dept: OTHER | Age: 73
Discharge: HOME | End: 2018-05-07
Attending: INTERNAL MEDICINE | Admitting: INTERNAL MEDICINE

## 2018-05-09 ENCOUNTER — HOSPITAL ENCOUNTER (OUTPATIENT)
Dept: OTHER | Age: 73
Discharge: HOME | End: 2018-05-09
Attending: INTERNAL MEDICINE | Admitting: INTERNAL MEDICINE

## 2018-05-09 VITALS
HEART RATE: 60 BPM | BODY MASS INDEX: 20.76 KG/M2 | HEIGHT: 70 IN | DIASTOLIC BLOOD PRESSURE: 53 MMHG | TEMPERATURE: 97.8 F | OXYGEN SATURATION: 96 % | WEIGHT: 145 LBS | SYSTOLIC BLOOD PRESSURE: 105 MMHG | RESPIRATION RATE: 18 BRPM

## 2018-05-09 LAB
GLUCOSE BLD-MCNC: 119 MG/DL (ref 70–99)
GLUCOSE BLD-MCNC: 132 MG/DL (ref 70–99)
PERFORMED ON: ABNORMAL
PERFORMED ON: ABNORMAL

## 2018-05-09 RX ORDER — SODIUM CHLORIDE, SODIUM LACTATE, POTASSIUM CHLORIDE, CALCIUM CHLORIDE 600; 310; 30; 20 MG/100ML; MG/100ML; MG/100ML; MG/100ML
INJECTION, SOLUTION INTRAVENOUS ONCE
Status: COMPLETED | OUTPATIENT
Start: 2018-05-09 | End: 2018-05-09

## 2018-05-09 RX ORDER — ONDANSETRON 2 MG/ML
4 INJECTION INTRAMUSCULAR; INTRAVENOUS ONCE
Status: DISCONTINUED | OUTPATIENT
Start: 2018-05-09 | End: 2018-05-10 | Stop reason: HOSPADM

## 2018-05-09 RX ORDER — ONDANSETRON 2 MG/ML
4 INJECTION INTRAMUSCULAR; INTRAVENOUS ONCE
Status: COMPLETED | OUTPATIENT
Start: 2018-05-09 | End: 2018-05-09

## 2018-05-09 RX ADMIN — SODIUM CHLORIDE, SODIUM LACTATE, POTASSIUM CHLORIDE, CALCIUM CHLORIDE: 600; 310; 30; 20 INJECTION, SOLUTION INTRAVENOUS at 08:05

## 2018-05-09 RX ADMIN — ONDANSETRON 4 MG: 2 INJECTION INTRAMUSCULAR; INTRAVENOUS at 08:59

## 2018-05-09 ASSESSMENT — PAIN - FUNCTIONAL ASSESSMENT: PAIN_FUNCTIONAL_ASSESSMENT: 0-10

## 2018-05-15 ENCOUNTER — HOSPITAL ENCOUNTER (OUTPATIENT)
Dept: GENERAL RADIOLOGY | Age: 73
Discharge: HOME | End: 2018-05-15
Attending: INTERNAL MEDICINE | Admitting: INTERNAL MEDICINE

## 2018-05-15 DIAGNOSIS — R13.10 DYSPHAGIA, UNSPECIFIED TYPE: ICD-10-CM

## 2018-05-15 DIAGNOSIS — K21.00 GASTRO-ESOPHAGEAL REFLUX DISEASE WITH ESOPHAGITIS: ICD-10-CM

## 2018-05-15 DIAGNOSIS — K21.00 GASTROESOPHAGEAL REFLUX DISEASE WITH ESOPHAGITIS: ICD-10-CM

## 2018-07-16 DIAGNOSIS — M54.50 CHRONIC MIDLINE LOW BACK PAIN WITHOUT SCIATICA: ICD-10-CM

## 2018-07-16 DIAGNOSIS — G89.29 CHRONIC MIDLINE LOW BACK PAIN WITHOUT SCIATICA: ICD-10-CM

## 2018-07-16 RX ORDER — TRAMADOL HYDROCHLORIDE 50 MG/1
TABLET ORAL
Qty: 30 TABLET | Refills: 0 | Status: SHIPPED | OUTPATIENT
Start: 2018-07-16 | End: 2018-08-15

## 2018-09-10 ENCOUNTER — OFFICE VISIT (OUTPATIENT)
Dept: FAMILY MEDICINE CLINIC | Age: 73
End: 2018-09-10

## 2018-09-10 VITALS
BODY MASS INDEX: 21.52 KG/M2 | DIASTOLIC BLOOD PRESSURE: 74 MMHG | HEART RATE: 63 BPM | OXYGEN SATURATION: 96 % | SYSTOLIC BLOOD PRESSURE: 142 MMHG | TEMPERATURE: 98 F | WEIGHT: 150 LBS

## 2018-09-10 DIAGNOSIS — N39.46 MIXED INCONTINENCE: ICD-10-CM

## 2018-09-10 DIAGNOSIS — D50.0 IRON DEFICIENCY ANEMIA DUE TO CHRONIC BLOOD LOSS: ICD-10-CM

## 2018-09-10 DIAGNOSIS — F41.9 ANXIETY: ICD-10-CM

## 2018-09-10 DIAGNOSIS — R73.9 HYPERGLYCEMIA: Primary | ICD-10-CM

## 2018-09-10 DIAGNOSIS — E78.00 PURE HYPERCHOLESTEROLEMIA: ICD-10-CM

## 2018-09-10 DIAGNOSIS — G89.29 CHRONIC BILATERAL LOW BACK PAIN WITHOUT SCIATICA: ICD-10-CM

## 2018-09-10 DIAGNOSIS — M54.50 CHRONIC BILATERAL LOW BACK PAIN WITHOUT SCIATICA: ICD-10-CM

## 2018-09-10 DIAGNOSIS — K21.9 GASTROESOPHAGEAL REFLUX DISEASE WITHOUT ESOPHAGITIS: ICD-10-CM

## 2018-09-10 LAB
HCT VFR BLD CALC: 40.3 % (ref 36–48)
HEMOGLOBIN: 13.6 G/DL (ref 12–16)
MCH RBC QN AUTO: 31.5 PG (ref 26–34)
MCHC RBC AUTO-ENTMCNC: 33.7 G/DL (ref 31–36)
MCV RBC AUTO: 93.4 FL (ref 80–100)
PDW BLD-RTO: 14 % (ref 12.4–15.4)
PLATELET # BLD: 226 K/UL (ref 135–450)
PMV BLD AUTO: 8.5 FL (ref 5–10.5)
RBC # BLD: 4.32 M/UL (ref 4–5.2)
WBC # BLD: 7.6 K/UL (ref 4–11)

## 2018-09-10 PROCEDURE — G8420 CALC BMI NORM PARAMETERS: HCPCS | Performed by: FAMILY MEDICINE

## 2018-09-10 PROCEDURE — G8427 DOCREV CUR MEDS BY ELIG CLIN: HCPCS | Performed by: FAMILY MEDICINE

## 2018-09-10 PROCEDURE — G8400 PT W/DXA NO RESULTS DOC: HCPCS | Performed by: FAMILY MEDICINE

## 2018-09-10 PROCEDURE — 36415 COLL VENOUS BLD VENIPUNCTURE: CPT | Performed by: FAMILY MEDICINE

## 2018-09-10 PROCEDURE — 4040F PNEUMOC VAC/ADMIN/RCVD: CPT | Performed by: FAMILY MEDICINE

## 2018-09-10 PROCEDURE — 3017F COLORECTAL CA SCREEN DOC REV: CPT | Performed by: FAMILY MEDICINE

## 2018-09-10 PROCEDURE — 1090F PRES/ABSN URINE INCON ASSESS: CPT | Performed by: FAMILY MEDICINE

## 2018-09-10 PROCEDURE — 1036F TOBACCO NON-USER: CPT | Performed by: FAMILY MEDICINE

## 2018-09-10 PROCEDURE — 0509F URINE INCON PLAN DOCD: CPT | Performed by: FAMILY MEDICINE

## 2018-09-10 PROCEDURE — 99214 OFFICE O/P EST MOD 30 MIN: CPT | Performed by: FAMILY MEDICINE

## 2018-09-10 PROCEDURE — 1123F ACP DISCUSS/DSCN MKR DOCD: CPT | Performed by: FAMILY MEDICINE

## 2018-09-10 PROCEDURE — 1101F PT FALLS ASSESS-DOCD LE1/YR: CPT | Performed by: FAMILY MEDICINE

## 2018-09-10 RX ORDER — TRAMADOL HYDROCHLORIDE 50 MG/1
50 TABLET ORAL EVERY 6 HOURS PRN
Qty: 30 TABLET | Refills: 1
Start: 2018-09-10 | End: 2019-04-01 | Stop reason: SDUPTHER

## 2018-09-10 RX ORDER — FERROUS SULFATE 325(65) MG
325 TABLET ORAL
Qty: 90 TABLET | Refills: 1 | Status: CANCELLED | OUTPATIENT
Start: 2018-09-10

## 2018-09-10 RX ORDER — FLUOXETINE HYDROCHLORIDE 40 MG/1
40 CAPSULE ORAL DAILY
Qty: 30 CAPSULE | Refills: 5 | Status: SHIPPED | OUTPATIENT
Start: 2018-09-10 | End: 2019-04-01 | Stop reason: SDUPTHER

## 2018-09-10 RX ORDER — OMEPRAZOLE 40 MG/1
40 CAPSULE, DELAYED RELEASE ORAL DAILY
Qty: 30 CAPSULE | Refills: 5 | Status: SHIPPED | OUTPATIENT
Start: 2018-09-10 | End: 2019-02-22 | Stop reason: SDUPTHER

## 2018-09-10 RX ORDER — ATORVASTATIN CALCIUM 40 MG/1
40 TABLET, FILM COATED ORAL DAILY
Qty: 30 TABLET | Refills: 5 | Status: SHIPPED | OUTPATIENT
Start: 2018-09-10 | End: 2019-04-01 | Stop reason: SDUPTHER

## 2018-09-10 RX ORDER — OXYBUTYNIN CHLORIDE 5 MG/1
TABLET ORAL
Qty: 90 TABLET | Refills: 5 | Status: SHIPPED | OUTPATIENT
Start: 2018-09-10 | End: 2019-04-01 | Stop reason: SDUPTHER

## 2018-09-11 LAB
ESTIMATED AVERAGE GLUCOSE: 148.5 MG/DL
HBA1C MFR BLD: 6.8 %

## 2018-09-18 DIAGNOSIS — E11.9 TYPE 2 DIABETES MELLITUS WITHOUT COMPLICATION, WITHOUT LONG-TERM CURRENT USE OF INSULIN (HCC): Primary | ICD-10-CM

## 2018-09-18 DIAGNOSIS — R73.9 HYPERGLYCEMIA: Primary | ICD-10-CM

## 2018-11-28 ENCOUNTER — TELEPHONE (OUTPATIENT)
Dept: FAMILY MEDICINE CLINIC | Age: 73
End: 2018-11-28

## 2018-12-12 ENCOUNTER — NURSE ONLY (OUTPATIENT)
Dept: FAMILY MEDICINE CLINIC | Age: 73
End: 2018-12-12
Payer: COMMERCIAL

## 2018-12-12 DIAGNOSIS — R73.9 HYPERGLYCEMIA: ICD-10-CM

## 2018-12-12 PROCEDURE — 36415 COLL VENOUS BLD VENIPUNCTURE: CPT | Performed by: FAMILY MEDICINE

## 2018-12-12 NOTE — PROGRESS NOTES
Lab preformed in L arm and sent number of tubes below to be processed. 1 attempt made and stopped bleeding by applying band aide and guaze.      0 Red    1 purple    0 blue    0 Urine

## 2018-12-13 LAB
ESTIMATED AVERAGE GLUCOSE: 131.2 MG/DL
HBA1C MFR BLD: 6.2 %

## 2019-01-14 ENCOUNTER — OFFICE VISIT (OUTPATIENT)
Dept: FAMILY MEDICINE CLINIC | Age: 74
End: 2019-01-14
Payer: COMMERCIAL

## 2019-01-14 VITALS
BODY MASS INDEX: 20.95 KG/M2 | TEMPERATURE: 98 F | DIASTOLIC BLOOD PRESSURE: 69 MMHG | WEIGHT: 146 LBS | SYSTOLIC BLOOD PRESSURE: 137 MMHG | OXYGEN SATURATION: 98 % | HEART RATE: 62 BPM

## 2019-01-14 DIAGNOSIS — R31.9 URINARY TRACT INFECTION WITH HEMATURIA, SITE UNSPECIFIED: Primary | ICD-10-CM

## 2019-01-14 DIAGNOSIS — N39.0 URINARY TRACT INFECTION WITH HEMATURIA, SITE UNSPECIFIED: Primary | ICD-10-CM

## 2019-01-14 LAB
BACTERIA: ABNORMAL /HPF
BILIRUBIN URINE: NEGATIVE
BILIRUBIN, POC: ABNORMAL
BLOOD URINE, POC: ABNORMAL
BLOOD, URINE: ABNORMAL
CLARITY, POC: CLEAR
CLARITY: ABNORMAL
COLOR, POC: YELLOW
COLOR: YELLOW
EPITHELIAL CELLS, UA: 0 /HPF (ref 0–5)
GLUCOSE URINE, POC: ABNORMAL
GLUCOSE URINE: NEGATIVE MG/DL
HYALINE CASTS: 0 /LPF (ref 0–8)
KETONES, POC: ABNORMAL
KETONES, URINE: NEGATIVE MG/DL
LEUKOCYTE EST, POC: ABNORMAL
LEUKOCYTE ESTERASE, URINE: ABNORMAL
MICROSCOPIC EXAMINATION: YES
NITRITE, POC: ABNORMAL
NITRITE, URINE: NEGATIVE
PH UA: 5.5
PH, POC: 5.5
PROTEIN UA: ABNORMAL MG/DL
PROTEIN, POC: ABNORMAL
RBC UA: ABNORMAL /HPF (ref 0–2)
SPECIFIC GRAVITY UA: 1.02
SPECIFIC GRAVITY, POC: 1.02
UROBILINOGEN, POC: 0.2
UROBILINOGEN, URINE: 0.2 E.U./DL
WBC UA: 30 /HPF (ref 0–5)

## 2019-01-14 PROCEDURE — G8400 PT W/DXA NO RESULTS DOC: HCPCS | Performed by: FAMILY MEDICINE

## 2019-01-14 PROCEDURE — G8420 CALC BMI NORM PARAMETERS: HCPCS | Performed by: FAMILY MEDICINE

## 2019-01-14 PROCEDURE — 1101F PT FALLS ASSESS-DOCD LE1/YR: CPT | Performed by: FAMILY MEDICINE

## 2019-01-14 PROCEDURE — 1036F TOBACCO NON-USER: CPT | Performed by: FAMILY MEDICINE

## 2019-01-14 PROCEDURE — 1090F PRES/ABSN URINE INCON ASSESS: CPT | Performed by: FAMILY MEDICINE

## 2019-01-14 PROCEDURE — 1123F ACP DISCUSS/DSCN MKR DOCD: CPT | Performed by: FAMILY MEDICINE

## 2019-01-14 PROCEDURE — 4040F PNEUMOC VAC/ADMIN/RCVD: CPT | Performed by: FAMILY MEDICINE

## 2019-01-14 PROCEDURE — G8427 DOCREV CUR MEDS BY ELIG CLIN: HCPCS | Performed by: FAMILY MEDICINE

## 2019-01-14 PROCEDURE — 99214 OFFICE O/P EST MOD 30 MIN: CPT | Performed by: FAMILY MEDICINE

## 2019-01-14 PROCEDURE — G8482 FLU IMMUNIZE ORDER/ADMIN: HCPCS | Performed by: FAMILY MEDICINE

## 2019-01-14 PROCEDURE — 81002 URINALYSIS NONAUTO W/O SCOPE: CPT | Performed by: FAMILY MEDICINE

## 2019-01-14 PROCEDURE — 3017F COLORECTAL CA SCREEN DOC REV: CPT | Performed by: FAMILY MEDICINE

## 2019-01-14 RX ORDER — NITROFURANTOIN 25; 75 MG/1; MG/1
100 CAPSULE ORAL 2 TIMES DAILY
Qty: 10 CAPSULE | Refills: 0 | Status: SHIPPED | OUTPATIENT
Start: 2019-01-14 | End: 2019-01-19

## 2019-01-14 ASSESSMENT — ENCOUNTER SYMPTOMS
RESPIRATORY NEGATIVE: 1
GASTROINTESTINAL NEGATIVE: 1

## 2019-01-15 ENCOUNTER — TELEPHONE (OUTPATIENT)
Dept: FAMILY MEDICINE CLINIC | Age: 74
End: 2019-01-15

## 2019-01-16 LAB
ORGANISM: ABNORMAL
URINE CULTURE, ROUTINE: ABNORMAL
URINE CULTURE, ROUTINE: ABNORMAL

## 2019-01-17 DIAGNOSIS — R31.9 HEMATURIA, UNSPECIFIED TYPE: ICD-10-CM

## 2019-01-17 DIAGNOSIS — Z87.440 HX: UTI (URINARY TRACT INFECTION): Primary | ICD-10-CM

## 2019-01-17 DIAGNOSIS — N95.0 POSTMENOPAUSAL BLEEDING: ICD-10-CM

## 2019-02-07 ENCOUNTER — HOSPITAL ENCOUNTER (OUTPATIENT)
Dept: ULTRASOUND IMAGING | Age: 74
Discharge: HOME | End: 2019-02-07
Payer: COMMERCIAL

## 2019-02-07 DIAGNOSIS — N39.45 CONTINUOUS LEAKAGE OF URINE: ICD-10-CM

## 2019-02-07 DIAGNOSIS — R31.9 HEMATURIA, UNSPECIFIED TYPE: ICD-10-CM

## 2019-02-07 DIAGNOSIS — R31.9 URINARY TRACT INFECTION WITH HEMATURIA, SITE UNSPECIFIED: ICD-10-CM

## 2019-02-07 DIAGNOSIS — N95.0 POSTMENOPAUSAL BLEEDING: ICD-10-CM

## 2019-02-07 DIAGNOSIS — N39.0 URINARY TRACT INFECTION WITH HEMATURIA, SITE UNSPECIFIED: ICD-10-CM

## 2019-02-07 PROCEDURE — 76856 US EXAM PELVIC COMPLETE: CPT

## 2019-02-07 PROCEDURE — 76830 TRANSVAGINAL US NON-OB: CPT

## 2019-02-07 PROCEDURE — 76700 US EXAM ABDOM COMPLETE: CPT

## 2019-02-08 ENCOUNTER — NURSE ONLY (OUTPATIENT)
Dept: FAMILY MEDICINE CLINIC | Age: 74
End: 2019-02-08
Payer: COMMERCIAL

## 2019-02-08 DIAGNOSIS — R31.9 HEMATURIA, UNSPECIFIED TYPE: ICD-10-CM

## 2019-02-08 DIAGNOSIS — Z87.440 HX: UTI (URINARY TRACT INFECTION): ICD-10-CM

## 2019-02-08 DIAGNOSIS — R93.89 ABNORMAL PELVIC ULTRASOUND: Primary | ICD-10-CM

## 2019-02-08 DIAGNOSIS — N36.2 URETHRAL CARUNCLE: ICD-10-CM

## 2019-02-08 LAB
BILIRUBIN URINE: NEGATIVE
BLOOD, URINE: NEGATIVE
CLARITY: CLEAR
COLOR: YELLOW
EPITHELIAL CELLS, UA: 0 /HPF (ref 0–5)
GLUCOSE URINE: NEGATIVE MG/DL
HYALINE CASTS: 0 /LPF (ref 0–8)
KETONES, URINE: NEGATIVE MG/DL
LEUKOCYTE ESTERASE, URINE: ABNORMAL
MICROSCOPIC EXAMINATION: YES
NITRITE, URINE: NEGATIVE
PH UA: 6
PROTEIN UA: NEGATIVE MG/DL
RBC UA: 1 /HPF (ref 0–4)
SPECIFIC GRAVITY UA: 1.01
UROBILINOGEN, URINE: 0.2 E.U./DL
WBC UA: 1 /HPF (ref 0–5)

## 2019-02-08 PROCEDURE — 81001 URINALYSIS AUTO W/SCOPE: CPT | Performed by: FAMILY MEDICINE

## 2019-02-10 LAB — URINE CULTURE, ROUTINE: NORMAL

## 2019-02-22 DIAGNOSIS — K21.9 GASTROESOPHAGEAL REFLUX DISEASE WITHOUT ESOPHAGITIS: ICD-10-CM

## 2019-02-22 RX ORDER — OMEPRAZOLE 40 MG/1
40 CAPSULE, DELAYED RELEASE ORAL DAILY
Qty: 30 CAPSULE | Refills: 0 | Status: SHIPPED | OUTPATIENT
Start: 2019-02-22 | End: 2019-04-01 | Stop reason: SDUPTHER

## 2019-03-19 ENCOUNTER — OFFICE VISIT (OUTPATIENT)
Dept: OBGYN CLINIC | Age: 74
End: 2019-03-19
Payer: COMMERCIAL

## 2019-03-19 VITALS
BODY MASS INDEX: 19.85 KG/M2 | WEIGHT: 141.8 LBS | DIASTOLIC BLOOD PRESSURE: 62 MMHG | SYSTOLIC BLOOD PRESSURE: 118 MMHG | TEMPERATURE: 98.6 F | HEART RATE: 63 BPM | HEIGHT: 71 IN

## 2019-03-19 DIAGNOSIS — R93.89 ABNORMAL PELVIC ULTRASOUND: Primary | ICD-10-CM

## 2019-03-19 DIAGNOSIS — Z87.440 RECENT URINARY TRACT INFECTION: ICD-10-CM

## 2019-03-19 DIAGNOSIS — R31.9 HEMATURIA, UNSPECIFIED TYPE: ICD-10-CM

## 2019-03-19 DIAGNOSIS — Z12.4 PAP SMEAR FOR CERVICAL CANCER SCREENING: ICD-10-CM

## 2019-03-19 DIAGNOSIS — Z12.39 SCREENING BREAST EXAMINATION: ICD-10-CM

## 2019-03-19 DIAGNOSIS — N63.10 LUMP OF RIGHT BREAST: ICD-10-CM

## 2019-03-19 DIAGNOSIS — Z01.419 ENCOUNTER FOR WELL WOMAN EXAM WITH ROUTINE GYNECOLOGICAL EXAM: Primary | ICD-10-CM

## 2019-03-19 PROCEDURE — 1123F ACP DISCUSS/DSCN MKR DOCD: CPT | Performed by: OBSTETRICS & GYNECOLOGY

## 2019-03-19 PROCEDURE — 1090F PRES/ABSN URINE INCON ASSESS: CPT | Performed by: OBSTETRICS & GYNECOLOGY

## 2019-03-19 PROCEDURE — G8420 CALC BMI NORM PARAMETERS: HCPCS | Performed by: OBSTETRICS & GYNECOLOGY

## 2019-03-19 PROCEDURE — 76856 US EXAM PELVIC COMPLETE: CPT | Performed by: OBSTETRICS & GYNECOLOGY

## 2019-03-19 PROCEDURE — 3017F COLORECTAL CA SCREEN DOC REV: CPT | Performed by: OBSTETRICS & GYNECOLOGY

## 2019-03-19 PROCEDURE — 99203 OFFICE O/P NEW LOW 30 MIN: CPT | Performed by: OBSTETRICS & GYNECOLOGY

## 2019-03-19 PROCEDURE — G8482 FLU IMMUNIZE ORDER/ADMIN: HCPCS | Performed by: OBSTETRICS & GYNECOLOGY

## 2019-03-19 PROCEDURE — G8427 DOCREV CUR MEDS BY ELIG CLIN: HCPCS | Performed by: OBSTETRICS & GYNECOLOGY

## 2019-03-19 PROCEDURE — 58100 BIOPSY OF UTERUS LINING: CPT | Performed by: OBSTETRICS & GYNECOLOGY

## 2019-03-19 PROCEDURE — 1036F TOBACCO NON-USER: CPT | Performed by: OBSTETRICS & GYNECOLOGY

## 2019-03-19 PROCEDURE — 1101F PT FALLS ASSESS-DOCD LE1/YR: CPT | Performed by: OBSTETRICS & GYNECOLOGY

## 2019-03-19 PROCEDURE — 4040F PNEUMOC VAC/ADMIN/RCVD: CPT | Performed by: OBSTETRICS & GYNECOLOGY

## 2019-03-19 PROCEDURE — G8400 PT W/DXA NO RESULTS DOC: HCPCS | Performed by: OBSTETRICS & GYNECOLOGY

## 2019-03-19 RX ORDER — TRAMADOL HYDROCHLORIDE 50 MG/1
50 TABLET ORAL EVERY 8 HOURS PRN
COMMUNITY
Start: 2015-09-23 | End: 2020-10-07 | Stop reason: ALTCHOICE

## 2019-03-20 ASSESSMENT — ENCOUNTER SYMPTOMS
COLOR CHANGE: 0
SORE THROAT: 0
BACK PAIN: 0
ABDOMINAL PAIN: 0
WHEEZING: 0
SHORTNESS OF BREATH: 0
NAUSEA: 0
VOMITING: 0
CHEST TIGHTNESS: 0
DIARRHEA: 0

## 2019-03-21 LAB
HPV COMMENT: NORMAL
HPV TYPE 16: NOT DETECTED
HPV TYPE 18: NOT DETECTED
HPVOH (OTHER TYPES): NOT DETECTED

## 2019-04-01 ENCOUNTER — OFFICE VISIT (OUTPATIENT)
Dept: FAMILY MEDICINE CLINIC | Age: 74
End: 2019-04-01
Payer: COMMERCIAL

## 2019-04-01 VITALS
DIASTOLIC BLOOD PRESSURE: 81 MMHG | TEMPERATURE: 97.5 F | WEIGHT: 139.6 LBS | OXYGEN SATURATION: 99 % | BODY MASS INDEX: 19.75 KG/M2 | SYSTOLIC BLOOD PRESSURE: 119 MMHG | HEART RATE: 62 BPM

## 2019-04-01 DIAGNOSIS — N39.46 MIXED INCONTINENCE: ICD-10-CM

## 2019-04-01 DIAGNOSIS — K21.9 GASTROESOPHAGEAL REFLUX DISEASE WITHOUT ESOPHAGITIS: ICD-10-CM

## 2019-04-01 DIAGNOSIS — M54.50 CHRONIC BILATERAL LOW BACK PAIN WITHOUT SCIATICA: ICD-10-CM

## 2019-04-01 DIAGNOSIS — E11.9 TYPE 2 DIABETES MELLITUS WITHOUT COMPLICATION, WITHOUT LONG-TERM CURRENT USE OF INSULIN (HCC): Primary | ICD-10-CM

## 2019-04-01 DIAGNOSIS — F41.9 ANXIETY: ICD-10-CM

## 2019-04-01 DIAGNOSIS — R31.9 HEMATURIA, UNSPECIFIED TYPE: ICD-10-CM

## 2019-04-01 DIAGNOSIS — L81.4 SENILE LENTIGO: ICD-10-CM

## 2019-04-01 DIAGNOSIS — Z23 NEED FOR PROPHYLACTIC VACCINATION AGAINST STREPTOCOCCUS PNEUMONIAE (PNEUMOCOCCUS): ICD-10-CM

## 2019-04-01 DIAGNOSIS — E78.00 PURE HYPERCHOLESTEROLEMIA: ICD-10-CM

## 2019-04-01 DIAGNOSIS — G89.29 CHRONIC BILATERAL LOW BACK PAIN WITHOUT SCIATICA: ICD-10-CM

## 2019-04-01 PROCEDURE — 0509F URINE INCON PLAN DOCD: CPT | Performed by: FAMILY MEDICINE

## 2019-04-01 PROCEDURE — 99214 OFFICE O/P EST MOD 30 MIN: CPT | Performed by: FAMILY MEDICINE

## 2019-04-01 PROCEDURE — 4040F PNEUMOC VAC/ADMIN/RCVD: CPT | Performed by: FAMILY MEDICINE

## 2019-04-01 PROCEDURE — 3046F HEMOGLOBIN A1C LEVEL >9.0%: CPT | Performed by: FAMILY MEDICINE

## 2019-04-01 PROCEDURE — 1036F TOBACCO NON-USER: CPT | Performed by: FAMILY MEDICINE

## 2019-04-01 PROCEDURE — G8400 PT W/DXA NO RESULTS DOC: HCPCS | Performed by: FAMILY MEDICINE

## 2019-04-01 PROCEDURE — 3017F COLORECTAL CA SCREEN DOC REV: CPT | Performed by: FAMILY MEDICINE

## 2019-04-01 PROCEDURE — 2022F DILAT RTA XM EVC RTNOPTHY: CPT | Performed by: FAMILY MEDICINE

## 2019-04-01 PROCEDURE — 90471 IMMUNIZATION ADMIN: CPT | Performed by: FAMILY MEDICINE

## 2019-04-01 PROCEDURE — 1123F ACP DISCUSS/DSCN MKR DOCD: CPT | Performed by: FAMILY MEDICINE

## 2019-04-01 PROCEDURE — G8420 CALC BMI NORM PARAMETERS: HCPCS | Performed by: FAMILY MEDICINE

## 2019-04-01 PROCEDURE — G8427 DOCREV CUR MEDS BY ELIG CLIN: HCPCS | Performed by: FAMILY MEDICINE

## 2019-04-01 PROCEDURE — 1090F PRES/ABSN URINE INCON ASSESS: CPT | Performed by: FAMILY MEDICINE

## 2019-04-01 PROCEDURE — 90732 PPSV23 VACC 2 YRS+ SUBQ/IM: CPT | Performed by: FAMILY MEDICINE

## 2019-04-01 RX ORDER — FLUOXETINE HYDROCHLORIDE 40 MG/1
40 CAPSULE ORAL DAILY
Qty: 30 CAPSULE | Refills: 5 | Status: SHIPPED | OUTPATIENT
Start: 2019-04-01 | End: 2019-10-03 | Stop reason: SDUPTHER

## 2019-04-01 RX ORDER — OXYBUTYNIN CHLORIDE 5 MG/1
TABLET ORAL
Qty: 90 TABLET | Refills: 5 | Status: SHIPPED | OUTPATIENT
Start: 2019-04-01 | End: 2019-10-14 | Stop reason: SDUPTHER

## 2019-04-01 RX ORDER — OMEPRAZOLE 40 MG/1
40 CAPSULE, DELAYED RELEASE ORAL DAILY
Qty: 30 CAPSULE | Refills: 5 | Status: SHIPPED | OUTPATIENT
Start: 2019-04-01 | End: 2019-10-14 | Stop reason: SDUPTHER

## 2019-04-01 RX ORDER — ATORVASTATIN CALCIUM 40 MG/1
40 TABLET, FILM COATED ORAL DAILY
Qty: 30 TABLET | Refills: 5 | Status: SHIPPED | OUTPATIENT
Start: 2019-04-01 | End: 2019-10-14 | Stop reason: SDUPTHER

## 2019-04-01 RX ORDER — TRAMADOL HYDROCHLORIDE 50 MG/1
50 TABLET ORAL EVERY 6 HOURS PRN
Qty: 30 TABLET | Refills: 1 | Status: SHIPPED | OUTPATIENT
Start: 2019-04-01 | End: 2019-06-30

## 2019-04-01 ASSESSMENT — PATIENT HEALTH QUESTIONNAIRE - PHQ9
1. LITTLE INTEREST OR PLEASURE IN DOING THINGS: 0
SUM OF ALL RESPONSES TO PHQ9 QUESTIONS 1 & 2: 0
SUM OF ALL RESPONSES TO PHQ QUESTIONS 1-9: 0
SUM OF ALL RESPONSES TO PHQ QUESTIONS 1-9: 0
2. FEELING DOWN, DEPRESSED OR HOPELESS: 0

## 2019-04-01 NOTE — PROGRESS NOTES
Subjective:      Patient ID: Brent Ruiz is a 72 y.o. female. HPI  Chief Complaint   Patient presents with    Hyperlipidemia     6 month f/u ; without cardiac, peripheral vascular or CNS problems. Denies myalgias    Anxiety     needs refills ; works well and controlled    Incontinence     Somewhat well controlled-does have dry mouth.  Gastroesophageal Reflux     Without progressive symptoms or nocturnal awakening with acid or choking    Immunizations     PNEU    Back Pain     Does a lot of gardening in the spring. Occasional severe low back pain. Uses opioid appropriately and rarely-gives her relief.  Hematuria     Please see last visit. Did see gynecologist-everything all right; no she needs to see the urologist but bills and finances are a barrier to care. Patient will do when she can. Understands the importance.  Skin Tag     List this on the right side of my face-been there forever but gotten slightly larger       Chief complaint and present illness: 72-year-old white female presents unaccompanied for routine follow-up. Please see above      Review of Systems   Constitutional: Negative. Respiratory: Negative. Gastrointestinal: Negative. background/entire past medical,social and family history obtained and reviewed/updated today   Objective:   Physical Exam   Constitutional: She appears well-developed and well-nourished. No distress. HENT:   Head: Normocephalic. Mouth/Throat: Oropharynx is clear and moist.   Eyes: Conjunctivae are normal.   Neck: Neck supple. Carotid bruit is not present. Cardiovascular: Normal rate, regular rhythm and normal heart sounds. No murmur heard. Pulses:       Dorsalis pedis pulses are 2+ on the right side, and 2+ on the left side. Posterior tibial pulses are 2+ on the right side, and 2+ on the left side. Pulmonary/Chest: Effort normal and breath sounds normal. She has no wheezes. She has no rales.    Musculoskeletal: She exhibits no edema. Lymphadenopathy:     She has no cervical adenopathy. Neurological: She is alert. Skin: Skin is warm. No rash noted. No pallor. Psychiatric: She has a normal mood and affect. Her behavior is normal.   Nursing note and vitals reviewed. /81 (Site: Right Upper Arm, Position: Sitting, Cuff Size: Small Adult)   Pulse 62   Temp 97.5 °F (36.4 °C) (Oral)   Wt 139 lb 9.6 oz (63.3 kg)   LMP  (LMP Unknown)   SpO2 99%   BMI 19.75 kg/m²   Controlled Substances Monitoring:     RX Monitoring 4/1/2019   Attestation The Prescription Monitoring Report for this patient was reviewed today. Chronic Pain Routine Monitoring Possible medication side effects, risk of tolerance/dependence & alternative treatments discussed. ;Obtaining appropriate analgesic effect of treatment. ;No signs of potential drug abuse or diversion identified: otherwise, see note documentation   Chronic Pain > 80 MEDD -      Assessment:      Saundra was seen today for hyperlipidemia, anxiety, incontinence, gastroesophageal reflux, immunizations, back pain, hematuria and skin tag. Diagnoses and all orders for this visit:    Type 2 diabetes mellitus without complication, without long-term current use of insulin ([facility])  -     Hemoglobin A1C; Future    Pure hypercholesterolemia  -     atorvastatin (LIPITOR) 40 MG tablet; Take 1 tablet by mouth daily  -     Lipid Panel; Future    Mixed incontinence  -     oxybutynin (DITROPAN) 5 MG tablet; TAKE 1 TABLET BY MOUTH 3 TIMES A DAY    Gastroesophageal reflux disease without esophagitis  -     omeprazole (PRILOSEC) 40 MG delayed release capsule; Take 1 capsule by mouth daily    Anxiety  -     FLUoxetine (PROZAC) 40 MG capsule; Take 1 capsule by mouth daily    Chronic bilateral low back pain without sciatica  -     traMADol (ULTRAM) 50 MG tablet; Take 1 tablet by mouth every 6 hours as needed for Pain for up to 90 days.     Need for prophylactic vaccination against Streptococcus pneumoniae (pneumococcus)  -     PNEUMOVAX 23 subcutaneous/IM (Pneumococcal polysaccharide vaccine 23-valent >= 3yo)    Hematuria, unspecified type    Senile lentigo           Plan:       Return in about 6 months (around 10/1/2019). There are no Patient Instructions on file for this visit.          Destiny Lawson

## 2019-04-02 ASSESSMENT — ENCOUNTER SYMPTOMS
RESPIRATORY NEGATIVE: 1
GASTROINTESTINAL NEGATIVE: 1

## 2019-10-03 DIAGNOSIS — F41.9 ANXIETY: ICD-10-CM

## 2019-10-03 RX ORDER — FLUOXETINE HYDROCHLORIDE 40 MG/1
CAPSULE ORAL
Qty: 30 CAPSULE | Refills: 0 | Status: SHIPPED | OUTPATIENT
Start: 2019-10-03 | End: 2019-10-14 | Stop reason: SDUPTHER

## 2019-10-14 ENCOUNTER — OFFICE VISIT (OUTPATIENT)
Dept: FAMILY MEDICINE CLINIC | Age: 74
End: 2019-10-14
Payer: COMMERCIAL

## 2019-10-14 VITALS
BODY MASS INDEX: 19.1 KG/M2 | OXYGEN SATURATION: 97 % | HEART RATE: 96 BPM | DIASTOLIC BLOOD PRESSURE: 70 MMHG | TEMPERATURE: 97.1 F | SYSTOLIC BLOOD PRESSURE: 116 MMHG | WEIGHT: 135 LBS

## 2019-10-14 DIAGNOSIS — E11.9 TYPE 2 DIABETES MELLITUS WITHOUT COMPLICATION, WITHOUT LONG-TERM CURRENT USE OF INSULIN (HCC): ICD-10-CM

## 2019-10-14 DIAGNOSIS — F41.9 ANXIETY: ICD-10-CM

## 2019-10-14 DIAGNOSIS — N39.46 MIXED INCONTINENCE: ICD-10-CM

## 2019-10-14 DIAGNOSIS — Z23 NEED FOR INFLUENZA VACCINATION: ICD-10-CM

## 2019-10-14 DIAGNOSIS — G89.29 CHRONIC BILATERAL LOW BACK PAIN WITHOUT SCIATICA: Primary | ICD-10-CM

## 2019-10-14 DIAGNOSIS — K21.9 GASTROESOPHAGEAL REFLUX DISEASE WITHOUT ESOPHAGITIS: ICD-10-CM

## 2019-10-14 DIAGNOSIS — E78.00 PURE HYPERCHOLESTEROLEMIA: ICD-10-CM

## 2019-10-14 DIAGNOSIS — M54.50 CHRONIC BILATERAL LOW BACK PAIN WITHOUT SCIATICA: Primary | ICD-10-CM

## 2019-10-14 LAB
CHOLESTEROL, TOTAL: 133 MG/DL (ref 0–199)
HDLC SERPL-MCNC: 48 MG/DL (ref 40–60)
LDL CHOLESTEROL CALCULATED: 59 MG/DL
TRIGL SERPL-MCNC: 132 MG/DL (ref 0–150)
VLDLC SERPL CALC-MCNC: 26 MG/DL

## 2019-10-14 PROCEDURE — 3044F HG A1C LEVEL LT 7.0%: CPT | Performed by: FAMILY MEDICINE

## 2019-10-14 PROCEDURE — 1090F PRES/ABSN URINE INCON ASSESS: CPT | Performed by: FAMILY MEDICINE

## 2019-10-14 PROCEDURE — 1123F ACP DISCUSS/DSCN MKR DOCD: CPT | Performed by: FAMILY MEDICINE

## 2019-10-14 PROCEDURE — G8400 PT W/DXA NO RESULTS DOC: HCPCS | Performed by: FAMILY MEDICINE

## 2019-10-14 PROCEDURE — 36415 COLL VENOUS BLD VENIPUNCTURE: CPT | Performed by: FAMILY MEDICINE

## 2019-10-14 PROCEDURE — 1036F TOBACCO NON-USER: CPT | Performed by: FAMILY MEDICINE

## 2019-10-14 PROCEDURE — 2022F DILAT RTA XM EVC RTNOPTHY: CPT | Performed by: FAMILY MEDICINE

## 2019-10-14 PROCEDURE — 3017F COLORECTAL CA SCREEN DOC REV: CPT | Performed by: FAMILY MEDICINE

## 2019-10-14 PROCEDURE — G8420 CALC BMI NORM PARAMETERS: HCPCS | Performed by: FAMILY MEDICINE

## 2019-10-14 PROCEDURE — 90653 IIV ADJUVANT VACCINE IM: CPT | Performed by: FAMILY MEDICINE

## 2019-10-14 PROCEDURE — G8427 DOCREV CUR MEDS BY ELIG CLIN: HCPCS | Performed by: FAMILY MEDICINE

## 2019-10-14 PROCEDURE — 90471 IMMUNIZATION ADMIN: CPT | Performed by: FAMILY MEDICINE

## 2019-10-14 PROCEDURE — 0509F URINE INCON PLAN DOCD: CPT | Performed by: FAMILY MEDICINE

## 2019-10-14 PROCEDURE — 4040F PNEUMOC VAC/ADMIN/RCVD: CPT | Performed by: FAMILY MEDICINE

## 2019-10-14 PROCEDURE — G8482 FLU IMMUNIZE ORDER/ADMIN: HCPCS | Performed by: FAMILY MEDICINE

## 2019-10-14 PROCEDURE — 99214 OFFICE O/P EST MOD 30 MIN: CPT | Performed by: FAMILY MEDICINE

## 2019-10-14 RX ORDER — FLUOXETINE HYDROCHLORIDE 40 MG/1
CAPSULE ORAL
Qty: 30 CAPSULE | Refills: 5 | Status: SHIPPED | OUTPATIENT
Start: 2019-10-14 | End: 2020-03-17 | Stop reason: SDUPTHER

## 2019-10-14 RX ORDER — ATORVASTATIN CALCIUM 40 MG/1
40 TABLET, FILM COATED ORAL DAILY
Qty: 30 TABLET | Refills: 5 | Status: SHIPPED | OUTPATIENT
Start: 2019-10-14 | End: 2020-03-17 | Stop reason: SDUPTHER

## 2019-10-14 RX ORDER — OXYBUTYNIN CHLORIDE 5 MG/1
TABLET ORAL
Qty: 90 TABLET | Refills: 5 | Status: SHIPPED | OUTPATIENT
Start: 2019-10-14 | End: 2020-03-17 | Stop reason: SDUPTHER

## 2019-10-14 RX ORDER — OMEPRAZOLE 40 MG/1
40 CAPSULE, DELAYED RELEASE ORAL DAILY
Qty: 30 CAPSULE | Refills: 5 | Status: SHIPPED | OUTPATIENT
Start: 2019-10-14 | End: 2020-03-17 | Stop reason: SDUPTHER

## 2019-10-15 LAB
ESTIMATED AVERAGE GLUCOSE: 122.6 MG/DL
HBA1C MFR BLD: 5.9 %

## 2020-03-17 RX ORDER — OXYBUTYNIN CHLORIDE 5 MG/1
TABLET ORAL
Qty: 90 TABLET | Refills: 5 | Status: SHIPPED | OUTPATIENT
Start: 2020-03-17 | End: 2021-02-24

## 2020-03-17 RX ORDER — FLUOXETINE HYDROCHLORIDE 40 MG/1
CAPSULE ORAL
Qty: 30 CAPSULE | Refills: 5 | Status: SHIPPED | OUTPATIENT
Start: 2020-03-17 | End: 2020-11-03

## 2020-03-17 RX ORDER — ATORVASTATIN CALCIUM 40 MG/1
40 TABLET, FILM COATED ORAL DAILY
Qty: 30 TABLET | Refills: 5 | Status: SHIPPED | OUTPATIENT
Start: 2020-03-17 | End: 2020-10-15 | Stop reason: SDUPTHER

## 2020-03-17 RX ORDER — OMEPRAZOLE 40 MG/1
40 CAPSULE, DELAYED RELEASE ORAL DAILY
Qty: 30 CAPSULE | Refills: 5 | Status: SHIPPED | OUTPATIENT
Start: 2020-03-17 | End: 2020-10-15 | Stop reason: SDUPTHER

## 2020-05-19 ENCOUNTER — OFFICE VISIT (OUTPATIENT)
Dept: FAMILY MEDICINE CLINIC | Age: 75
End: 2020-05-19
Payer: COMMERCIAL

## 2020-05-19 VITALS
WEIGHT: 131 LBS | DIASTOLIC BLOOD PRESSURE: 86 MMHG | HEART RATE: 91 BPM | SYSTOLIC BLOOD PRESSURE: 142 MMHG | TEMPERATURE: 97 F | OXYGEN SATURATION: 97 % | BODY MASS INDEX: 21.05 KG/M2 | HEIGHT: 66 IN | RESPIRATION RATE: 20 BRPM

## 2020-05-19 PROCEDURE — 1036F TOBACCO NON-USER: CPT | Performed by: NURSE PRACTITIONER

## 2020-05-19 PROCEDURE — G8420 CALC BMI NORM PARAMETERS: HCPCS | Performed by: NURSE PRACTITIONER

## 2020-05-19 PROCEDURE — 1090F PRES/ABSN URINE INCON ASSESS: CPT | Performed by: NURSE PRACTITIONER

## 2020-05-19 PROCEDURE — 3017F COLORECTAL CA SCREEN DOC REV: CPT | Performed by: NURSE PRACTITIONER

## 2020-05-19 PROCEDURE — G8400 PT W/DXA NO RESULTS DOC: HCPCS | Performed by: NURSE PRACTITIONER

## 2020-05-19 PROCEDURE — 93000 ELECTROCARDIOGRAM COMPLETE: CPT | Performed by: NURSE PRACTITIONER

## 2020-05-19 PROCEDURE — 4040F PNEUMOC VAC/ADMIN/RCVD: CPT | Performed by: NURSE PRACTITIONER

## 2020-05-19 PROCEDURE — 99214 OFFICE O/P EST MOD 30 MIN: CPT | Performed by: NURSE PRACTITIONER

## 2020-05-19 PROCEDURE — 1123F ACP DISCUSS/DSCN MKR DOCD: CPT | Performed by: NURSE PRACTITIONER

## 2020-05-19 PROCEDURE — G8427 DOCREV CUR MEDS BY ELIG CLIN: HCPCS | Performed by: NURSE PRACTITIONER

## 2020-05-19 ASSESSMENT — ENCOUNTER SYMPTOMS
NAUSEA: 0
CHEST TIGHTNESS: 0
COLOR CHANGE: 0
EYE DISCHARGE: 0
ABDOMINAL PAIN: 0
WHEEZING: 0
DIARRHEA: 0
PHOTOPHOBIA: 0
EYE PAIN: 0
SINUS PRESSURE: 0
COUGH: 0
STRIDOR: 0
SHORTNESS OF BREATH: 0
CHOKING: 0
RHINORRHEA: 0
VOICE CHANGE: 0
BLOOD IN STOOL: 0
SINUS PAIN: 0
BACK PAIN: 0
TROUBLE SWALLOWING: 0
EYE ITCHING: 0
VOMITING: 0
EYE REDNESS: 0
SORE THROAT: 0
CONSTIPATION: 0

## 2020-05-19 NOTE — PROGRESS NOTES
anesthesia: Local block   Known anesthesia problems: None   Bleeding risk: Anticoagulant therapy- Eliquis  Personal or FH of DVT/PE: No    Patient objection to receiving blood products: No    Past Medical History:   Diagnosis Date    Anemia     Arthritis     Atrial fibrillation [facility] [address]     Back pain     Diabetes mellitus ([facility] [address]     diet controlled, does not take medication    Hyperlipidemia     Hypertension     Total hip replacement 8/10/2011       Past Surgical History:   Procedure Laterality Date    CHOLECYSTECTOMY      COLONOSCOPY      normal per pt    COLONOSCOPY  2017    colon polyps    DENTAL SURGERY      full mouth extractions    JOINT REPLACEMENT  8-    Right total hip replacement, anterior approach     NASAL SEPTUM SURGERY      SINUS ENDOSCOPY      TONSILLECTOMY      TUBAL LIGATION         Family History   Problem Relation Age of Onset    Cancer Mother         breast    Cancer Father         colon       Social History     Socioeconomic History    Marital status:       Spouse name: Not on file    Number of children: Not on file    Years of education: Not on file    Highest education level: Not on file   Occupational History    Not on file   Social Needs    Financial resource strain: Not on file    Food insecurity     Worry: Not on file     Inability: Not on file    Transportation needs     Medical: Not on file     Non-medical: Not on file   Tobacco Use    Smoking status: Former Smoker     Packs/day: 0.50     Years: 44.00     Pack years: 22.00     Types: Cigarettes     Start date:      Last attempt to quit:      Years since quittin.3    Smokeless tobacco: Never Used   Substance and Sexual Activity    Alcohol use: No     Alcohol/week: 0.0 standard drinks    Drug use: No    Sexual activity: Not Currently   Lifestyle    Physical activity     Days per week: Not on file     Minutes per session: Not on file    Stress: Not on file   Relationships   

## 2020-07-07 ENCOUNTER — OFFICE VISIT (OUTPATIENT)
Dept: FAMILY MEDICINE CLINIC | Age: 75
End: 2020-07-07
Payer: COMMERCIAL

## 2020-07-07 VITALS
HEIGHT: 68 IN | RESPIRATION RATE: 16 BRPM | OXYGEN SATURATION: 96 % | TEMPERATURE: 97.8 F | DIASTOLIC BLOOD PRESSURE: 68 MMHG | HEART RATE: 86 BPM | SYSTOLIC BLOOD PRESSURE: 118 MMHG | WEIGHT: 137.2 LBS | BODY MASS INDEX: 20.79 KG/M2

## 2020-07-07 PROCEDURE — 99214 OFFICE O/P EST MOD 30 MIN: CPT | Performed by: NURSE PRACTITIONER

## 2020-07-07 PROCEDURE — 1036F TOBACCO NON-USER: CPT | Performed by: NURSE PRACTITIONER

## 2020-07-07 PROCEDURE — 4040F PNEUMOC VAC/ADMIN/RCVD: CPT | Performed by: NURSE PRACTITIONER

## 2020-07-07 PROCEDURE — 3017F COLORECTAL CA SCREEN DOC REV: CPT | Performed by: NURSE PRACTITIONER

## 2020-07-07 PROCEDURE — 2022F DILAT RTA XM EVC RTNOPTHY: CPT | Performed by: NURSE PRACTITIONER

## 2020-07-07 PROCEDURE — G8400 PT W/DXA NO RESULTS DOC: HCPCS | Performed by: NURSE PRACTITIONER

## 2020-07-07 PROCEDURE — 1123F ACP DISCUSS/DSCN MKR DOCD: CPT | Performed by: NURSE PRACTITIONER

## 2020-07-07 PROCEDURE — G8420 CALC BMI NORM PARAMETERS: HCPCS | Performed by: NURSE PRACTITIONER

## 2020-07-07 PROCEDURE — 3046F HEMOGLOBIN A1C LEVEL >9.0%: CPT | Performed by: NURSE PRACTITIONER

## 2020-07-07 PROCEDURE — G8427 DOCREV CUR MEDS BY ELIG CLIN: HCPCS | Performed by: NURSE PRACTITIONER

## 2020-07-07 PROCEDURE — 1090F PRES/ABSN URINE INCON ASSESS: CPT | Performed by: NURSE PRACTITIONER

## 2020-07-07 ASSESSMENT — ENCOUNTER SYMPTOMS
NAUSEA: 0
DIARRHEA: 0
BACK PAIN: 0
ABDOMINAL PAIN: 0
WHEEZING: 0
COUGH: 0
SINUS PAIN: 0
STRIDOR: 0
COLOR CHANGE: 0
PHOTOPHOBIA: 0
BLOOD IN STOOL: 0
SHORTNESS OF BREATH: 0
EYE REDNESS: 0
EYE ITCHING: 0
VOICE CHANGE: 0
EYE DISCHARGE: 0
TROUBLE SWALLOWING: 0
RHINORRHEA: 0
CHOKING: 0
SINUS PRESSURE: 0
SORE THROAT: 0
CONSTIPATION: 0
EYE PAIN: 0
VOMITING: 0
CHEST TIGHTNESS: 0

## 2020-07-07 ASSESSMENT — PATIENT HEALTH QUESTIONNAIRE - PHQ9
SUM OF ALL RESPONSES TO PHQ QUESTIONS 1-9: 0
SUM OF ALL RESPONSES TO PHQ9 QUESTIONS 1 & 2: 0
2. FEELING DOWN, DEPRESSED OR HOPELESS: 0
1. LITTLE INTEREST OR PLEASURE IN DOING THINGS: 0
SUM OF ALL RESPONSES TO PHQ QUESTIONS 1-9: 0

## 2020-07-07 NOTE — PROGRESS NOTES
2020    Cj Vieyra (:  1945) is a 69 y.o. female, here for evaluation of the following medical concerns:    HPI      This patient is new to the practice and is here to establish care. The patients prior PCP was Dr. Marino Torres. We reviewed the patients allergies and current medications. We reviewed the patients medical, surgical and family history. DM: She states she is diet controlled. States last A1C was good. HTN: Well controlled in the office. Her cardiologist watches her closely. HLD:   She is doing well on her statin without any side effects    Afib: Had Cardioversion but still taking Eliquis without any side effects. Back Pain: She is going next Tuesday to go see pain management. Colonoscopy: normal, next one due in 5 years. Due in     Mammogram: Denies at this time, She does not do SBE, educated on the importance of this. Pap Smear: Went to GYN due to her having GYN issues. Last Pap 2019      Specialists:     Cardiologist: Dr. Juli Galeano     Review of Systems   Constitutional: Negative for activity change, appetite change, chills, diaphoresis, fatigue, fever and unexpected weight change. HENT: Negative for congestion, ear discharge, ear pain, hearing loss, nosebleeds, postnasal drip, rhinorrhea, sinus pressure, sinus pain, sneezing, sore throat, tinnitus, trouble swallowing and voice change. Eyes: Negative for photophobia, pain, discharge, redness and itching. Respiratory: Negative for cough, choking, chest tightness, shortness of breath, wheezing and stridor. Cardiovascular: Negative for chest pain, palpitations and leg swelling. Gastrointestinal: Negative for abdominal pain, blood in stool, constipation, diarrhea, nausea and vomiting. Endocrine: Negative for cold intolerance, heat intolerance, polydipsia and polyuria. Genitourinary: Negative for difficulty urinating, dysuria, enuresis, flank pain, frequency, hematuria and urgency. Musculoskeletal: Negative for back pain, gait problem, joint swelling, neck pain and neck stiffness. Skin: Negative for color change, pallor, rash and wound. Allergic/Immunologic: Negative for environmental allergies and food allergies. Neurological: Negative for dizziness, tremors, syncope, speech difficulty, weakness, light-headedness, numbness and headaches. Hematological: Negative for adenopathy. Does not bruise/bleed easily. Psychiatric/Behavioral: Negative for agitation, behavioral problems, confusion, decreased concentration, dysphoric mood, hallucinations, self-injury, sleep disturbance and suicidal ideas. The patient is not nervous/anxious and is not hyperactive. Prior to Visit Medications    Medication Sig Taking? Authorizing Provider   atorvastatin (LIPITOR) 40 MG tablet Take 1 tablet by mouth daily Yes Amelia Ordonez MD   FLUoxetine (PROZAC) 40 MG capsule TAKE ONE CAPSULE BY MOUTH DAILY Yes Amelia Ordonez MD   omeprazole (PRILOSEC) 40 MG delayed release capsule Take 1 capsule by mouth daily Yes Amelia Ordonez MD   oxybutynin (DITROPAN) 5 MG tablet TAKE 1 TABLET BY MOUTH 3 TIMES A DAY Yes mAelia Orodnez MD   traMADol (ULTRAM) 50 MG tablet Take 50 mg by mouth every 8 hours as needed for Pain.   Yes Historical Provider, MD   ELIQUIS 5 MG TABS tablet Take 1 tablet by mouth 2 times daily  Patient taking differently: Take 5 mg by mouth 2 times daily  Yes Amelia Ordonez MD   metoprolol (TOPROL-XL) 50 MG XL tablet Take 50 mg by mouth daily  Yes Ana Rosa Inman MD   propafenone (RYTHMOL SR) 225 MG SR capsule Take 150 mg by mouth every 8 hours  Yes Ana Rosa Inman MD        Allergies   Allergen Reactions    Naproxen Other (See Comments)     Oral ulcers      Lisinopril Other (See Comments)     Cough         Past Medical History:   Diagnosis Date    Anemia     Arthritis     Atrial fibrillation ([address]     Back pain     Diabetes mellitus ([facility] [address]     diet controlled, does Forced sexual activity: Not on file   Other Topics Concern    Not on file   Social History Narrative    6/2017 lives by nikof; for 7 yrs;retired,former-;        Family History   Problem Relation Age of Onset    Cancer Mother         breast    Cancer Father         colon       Vitals:    07/07/20 1307   BP: 118/68   Site: Right Upper Arm   Position: Sitting   Cuff Size: Medium Adult   Pulse: 86   Resp: 16   Temp: 97.8 °F (36.6 °C)   TempSrc: Oral   SpO2: 96%   Weight: 137 lb 3.2 oz (62.2 kg)   Height: 5' 5.98\" (1.676 m)     Estimated body mass index is 22.16 kg/m² as calculated from the following:    Height as of this encounter: 5' 5.98\" (1.676 m). Weight as of this encounter: 137 lb 3.2 oz (62.2 kg). Physical Exam  Vitals signs reviewed. Constitutional:       General: She is not in acute distress. Appearance: Normal appearance. She is well-developed. HENT:      Head: Normocephalic and atraumatic. Right Ear: Hearing, tympanic membrane, ear canal and external ear normal.      Left Ear: Hearing, tympanic membrane, ear canal and external ear normal.      Nose: Nose normal.      Right Sinus: No maxillary sinus tenderness or frontal sinus tenderness. Left Sinus: No maxillary sinus tenderness or frontal sinus tenderness. Mouth/Throat:      Pharynx: No oropharyngeal exudate. Eyes:      General:         Right eye: No discharge. Left eye: No discharge. Conjunctiva/sclera: Conjunctivae normal.      Pupils: Pupils are equal, round, and reactive to light. Neck:      Musculoskeletal: Normal range of motion. Thyroid: No thyromegaly. Vascular: No JVD. Trachea: No tracheal deviation. Cardiovascular:      Rate and Rhythm: Normal rate and regular rhythm. Heart sounds: Normal heart sounds. No murmur. No friction rub. Pulmonary:      Effort: Pulmonary effort is normal. No respiratory distress. Breath sounds: Normal breath sounds. No stridor. No decreased breath sounds, wheezing, rhonchi or rales. Abdominal:      General: Bowel sounds are normal. There is no distension. Palpations: Abdomen is soft. There is no mass. Tenderness: There is no abdominal tenderness. There is no guarding or rebound. Musculoskeletal: Normal range of motion. General: No tenderness. Lymphadenopathy:      Cervical: No cervical adenopathy. Skin:     General: Skin is warm and dry. Capillary Refill: Capillary refill takes less than 2 seconds. Findings: No rash. Neurological:      Mental Status: She is alert and oriented to person, place, and time. Sensory: Sensation is intact. Motor: Motor function is intact. Coordination: Coordination normal.   Psychiatric:         Attention and Perception: Attention and perception normal.         Mood and Affect: Mood normal.         Speech: Speech normal.         Behavior: Behavior normal. Behavior is cooperative. Thought Content: Thought content normal.         Cognition and Memory: Cognition normal.         Judgment: Judgment normal.         ASSESSMENT/PLAN:  1. Establishing care with new doctor, encounter for    - CBC Auto Differential; Future  - Comprehensive Metabolic Panel; Future  - Lipid Panel; Future  - TSH without Reflex; Future  - T4, Free; Future    2. Anxiety    - TSH without Reflex; Future  - T4, Free; Future    3. Hypertension, unspecified type    - CBC Auto Differential; Future  - Comprehensive Metabolic Panel; Future  - Lipid Panel; Future  - TSH without Reflex; Future  - T4, Free; Future    4. Paroxysmal atrial fibrillation (HCC)  - stable at this time    5. Hyperlipidemia, unspecified hyperlipidemia type    - Lipid Panel; Future    6. Atrial fibrillation, unspecified type ([facility] [address]  -see above    7. Type 2 diabetes mellitus without complication, without long-term current use of insulin (HCC)    - CBC Auto Differential; Future  - Comprehensive Metabolic Panel;  Future  -

## 2020-07-16 ENCOUNTER — NURSE ONLY (OUTPATIENT)
Dept: FAMILY MEDICINE CLINIC | Age: 75
End: 2020-07-16
Payer: COMMERCIAL

## 2020-07-16 LAB
A/G RATIO: 1.7 (ref 1.1–2.2)
ALBUMIN SERPL-MCNC: 3.9 G/DL (ref 3.4–5)
ALP BLD-CCNC: 123 U/L (ref 40–129)
ALT SERPL-CCNC: 21 U/L (ref 10–40)
ANION GAP SERPL CALCULATED.3IONS-SCNC: 9 MMOL/L (ref 3–16)
AST SERPL-CCNC: 20 U/L (ref 15–37)
BASOPHILS ABSOLUTE: 0 K/UL (ref 0–0.2)
BASOPHILS RELATIVE PERCENT: 0.8 %
BILIRUB SERPL-MCNC: 0.6 MG/DL (ref 0–1)
BUN BLDV-MCNC: 19 MG/DL (ref 7–20)
CALCIUM SERPL-MCNC: 9.6 MG/DL (ref 8.3–10.6)
CHLORIDE BLD-SCNC: 101 MMOL/L (ref 99–110)
CHOLESTEROL, TOTAL: 163 MG/DL (ref 0–199)
CO2: 27 MMOL/L (ref 21–32)
CREAT SERPL-MCNC: 0.9 MG/DL (ref 0.6–1.2)
EOSINOPHILS ABSOLUTE: 0.1 K/UL (ref 0–0.6)
EOSINOPHILS RELATIVE PERCENT: 1.4 %
GFR AFRICAN AMERICAN: >60
GFR NON-AFRICAN AMERICAN: >60
GLOBULIN: 2.3 G/DL
GLUCOSE BLD-MCNC: 102 MG/DL (ref 70–99)
HCT VFR BLD CALC: 36.6 % (ref 36–48)
HDLC SERPL-MCNC: 46 MG/DL (ref 40–60)
HEMOGLOBIN: 12.2 G/DL (ref 12–16)
LDL CHOLESTEROL CALCULATED: 98 MG/DL
LYMPHOCYTES ABSOLUTE: 2 K/UL (ref 1–5.1)
LYMPHOCYTES RELATIVE PERCENT: 33.8 %
MCH RBC QN AUTO: 31.5 PG (ref 26–34)
MCHC RBC AUTO-ENTMCNC: 33.4 G/DL (ref 31–36)
MCV RBC AUTO: 94.3 FL (ref 80–100)
MONOCYTES ABSOLUTE: 0.6 K/UL (ref 0–1.3)
MONOCYTES RELATIVE PERCENT: 9.5 %
NEUTROPHILS ABSOLUTE: 3.2 K/UL (ref 1.7–7.7)
NEUTROPHILS RELATIVE PERCENT: 54.5 %
PDW BLD-RTO: 13.9 % (ref 12.4–15.4)
PLATELET # BLD: 217 K/UL (ref 135–450)
PMV BLD AUTO: 8.7 FL (ref 5–10.5)
POTASSIUM SERPL-SCNC: 5.1 MMOL/L (ref 3.5–5.1)
RBC # BLD: 3.88 M/UL (ref 4–5.2)
SODIUM BLD-SCNC: 137 MMOL/L (ref 136–145)
T4 FREE: 1.1 NG/DL (ref 0.9–1.8)
TOTAL PROTEIN: 6.2 G/DL (ref 6.4–8.2)
TRIGL SERPL-MCNC: 95 MG/DL (ref 0–150)
TSH SERPL DL<=0.05 MIU/L-ACNC: 1.43 UIU/ML (ref 0.27–4.2)
VLDLC SERPL CALC-MCNC: 19 MG/DL
WBC # BLD: 5.9 K/UL (ref 4–11)

## 2020-07-16 PROCEDURE — 36415 COLL VENOUS BLD VENIPUNCTURE: CPT | Performed by: NURSE PRACTITIONER

## 2020-08-26 ENCOUNTER — TELEPHONE (OUTPATIENT)
Dept: FAMILY MEDICINE CLINIC | Age: 75
End: 2020-08-26

## 2020-08-26 NOTE — TELEPHONE ENCOUNTER
I will send in ringworm treatment follow-up in office if not improving on ointment apply ointment twice a day for 14 days

## 2020-08-26 NOTE — TELEPHONE ENCOUNTER
Pt took her dogs to [facility] and was told they have ring worm. She thinks she has ring worm but they look like little red dots which are itchy, she stated they look more like spider bites. They are under her clothes in areas where she get sweaty. Asking if you could send in an Rx to [address] [place] [address].

## 2020-10-07 ENCOUNTER — OFFICE VISIT (OUTPATIENT)
Dept: FAMILY MEDICINE CLINIC | Age: 75
End: 2020-10-07
Payer: MEDICARE

## 2020-10-07 VITALS
TEMPERATURE: 98.4 F | BODY MASS INDEX: 20.41 KG/M2 | HEART RATE: 97 BPM | WEIGHT: 135.2 LBS | DIASTOLIC BLOOD PRESSURE: 60 MMHG | SYSTOLIC BLOOD PRESSURE: 122 MMHG | OXYGEN SATURATION: 97 %

## 2020-10-07 LAB — HBA1C MFR BLD: 5.4 %

## 2020-10-07 PROCEDURE — 83036 HEMOGLOBIN GLYCOSYLATED A1C: CPT | Performed by: NURSE PRACTITIONER

## 2020-10-07 PROCEDURE — 90694 VACC AIIV4 NO PRSRV 0.5ML IM: CPT | Performed by: NURSE PRACTITIONER

## 2020-10-07 PROCEDURE — 99214 OFFICE O/P EST MOD 30 MIN: CPT | Performed by: NURSE PRACTITIONER

## 2020-10-07 PROCEDURE — G0008 ADMIN INFLUENZA VIRUS VAC: HCPCS | Performed by: NURSE PRACTITIONER

## 2020-10-07 ASSESSMENT — ENCOUNTER SYMPTOMS
SINUS PRESSURE: 0
COLOR CHANGE: 1
ABDOMINAL PAIN: 0
CHEST TIGHTNESS: 0
STRIDOR: 0
EYE ITCHING: 0
SHORTNESS OF BREATH: 0
VOICE CHANGE: 0
DIARRHEA: 0
WHEEZING: 0
VOMITING: 0
TROUBLE SWALLOWING: 0
RHINORRHEA: 0
EYE PAIN: 0
BLOOD IN STOOL: 0
SINUS PAIN: 0
PHOTOPHOBIA: 0
NAUSEA: 0
EYE REDNESS: 0
BACK PAIN: 0
CONSTIPATION: 0
SORE THROAT: 0
EYE DISCHARGE: 0
CHOKING: 0
COUGH: 0

## 2020-10-07 NOTE — PROGRESS NOTES
10/7/2020     Narciso Winslow (:  1945) is a 69 y.o. female, here for evaluation of the following medical concerns:    HPI    Type 2 diabetes: She was prediabetic about 6 months ago. She is due for hemoglobin A1c to check on this. She is diet regulated. A. fib: Stable at this time. Anxiety: Doing well on current medical regimen    Hyperlipidemia: Doing well on current medical regimen labs drawn about 3 months ago not due for labs today. Hypertension: Well controlled in office doing well on current medical regimen    Acute issues:    Rash: Started about amonth ago. She called in due to her dog having ringworm and she was concerned. Medication was called into pharmacy and she was told to follow-up if not improving. She states she still has it all over when observing her in the office these are cherry hemangiomas. Educated about skin condition and if she would like further assessment we can place a dermatology referral she refused at this time    Review of Systems   Constitutional: Negative for activity change, appetite change, chills, diaphoresis, fatigue, fever and unexpected weight change. HENT: Negative for congestion, ear discharge, ear pain, hearing loss, nosebleeds, postnasal drip, rhinorrhea, sinus pressure, sinus pain, sneezing, sore throat, tinnitus, trouble swallowing and voice change. Eyes: Negative for photophobia, pain, discharge, redness and itching. Respiratory: Negative for cough, choking, chest tightness, shortness of breath, wheezing and stridor. Cardiovascular: Negative for chest pain, palpitations and leg swelling. Gastrointestinal: Negative for abdominal pain, blood in stool, constipation, diarrhea, nausea and vomiting. Endocrine: Negative for cold intolerance, heat intolerance, polydipsia and polyuria. Genitourinary: Negative for difficulty urinating, dysuria, enuresis, flank pain, frequency, hematuria and urgency.    Musculoskeletal: Negative for back pain, gait problem, joint swelling, neck pain and neck stiffness. Skin: Positive for color change and rash. Negative for pallor and wound. Allergic/Immunologic: Negative for environmental allergies and food allergies. Neurological: Negative for dizziness, tremors, syncope, speech difficulty, weakness, light-headedness, numbness and headaches. Hematological: Negative for adenopathy. Does not bruise/bleed easily. Psychiatric/Behavioral: Negative for agitation, behavioral problems, confusion, decreased concentration, dysphoric mood, hallucinations, self-injury, sleep disturbance and suicidal ideas. The patient is not nervous/anxious and is not hyperactive. Prior to Visit Medications    Medication Sig Taking?  Authorizing Provider   atorvastatin (LIPITOR) 40 MG tablet Take 1 tablet by mouth daily Yes Branden Mora MD   FLUoxetine (PROZAC) 40 MG capsule TAKE ONE CAPSULE BY MOUTH DAILY Yes Branden Mora MD   omeprazole (PRILOSEC) 40 MG delayed release capsule Take 1 capsule by mouth daily Yes Branden Mora MD   oxybutynin (DITROPAN) 5 MG tablet TAKE 1 TABLET BY MOUTH 3 TIMES A DAY Yes Branden Mora MD   ELIQUIS 5 MG TABS tablet Take 1 tablet by mouth 2 times daily  Patient taking differently: Take 5 mg by mouth 2 times daily  Yes Branden Mora MD   metoprolol (TOPROL-XL) 50 MG XL tablet Take 50 mg by mouth daily  Yes Kenya Barker MD   propafenone (RYTHMOL SR) 225 MG SR capsule Take 150 mg by mouth every 8 hours  Yes Kenya Barker MD        Social History     Tobacco Use    Smoking status: Former Smoker     Packs/day: 0.50     Years: 44.00     Pack years: 22.00     Types: Cigarettes     Start date:      Last attempt to quit: 2008     Years since quittin.7    Smokeless tobacco: Never Used   Substance Use Topics    Alcohol use: No     Alcohol/week: 0.0 standard drinks        Vitals:    10/07/20 1148   BP: 122/60   Pulse: 97   Temp: 98.4 °F (36.9 °C)   SpO2: 97%   Weight: 135 lb 3.2 oz (61.3 kg)     Estimated body mass index is 20.41 kg/m² as calculated from the following:    Height as of 7/7/20: 5' 8.25\" (1.734 m). Weight as of this encounter: 135 lb 3.2 oz (61.3 kg). Physical Exam  Vitals signs reviewed. Constitutional:       General: She is not in acute distress. Appearance: Normal appearance. She is well-developed. HENT:      Head: Normocephalic and atraumatic. Right Ear: Hearing and external ear normal.      Left Ear: Hearing and external ear normal.      Nose: Nose normal.      Right Sinus: No maxillary sinus tenderness or frontal sinus tenderness. Left Sinus: No maxillary sinus tenderness or frontal sinus tenderness. Mouth/Throat:      Pharynx: No oropharyngeal exudate. Eyes:      General:         Right eye: No discharge. Left eye: No discharge. Conjunctiva/sclera: Conjunctivae normal.      Pupils: Pupils are equal, round, and reactive to light. Neck:      Musculoskeletal: Normal range of motion. Thyroid: No thyromegaly. Vascular: No JVD. Trachea: No tracheal deviation. Cardiovascular:      Rate and Rhythm: Normal rate and regular rhythm. Heart sounds: Normal heart sounds. No murmur. No friction rub. Pulmonary:      Effort: Pulmonary effort is normal. No respiratory distress. Breath sounds: Normal breath sounds. No stridor. No decreased breath sounds, wheezing, rhonchi or rales. Musculoskeletal: Normal range of motion. General: No tenderness. Lymphadenopathy:      Cervical: No cervical adenopathy. Skin:     General: Skin is warm and dry. Capillary Refill: Capillary refill takes less than 2 seconds. Findings: Lesion (cherry hemangiomas ) present. No rash. Neurological:      Mental Status: She is alert and oriented to person, place, and time. Sensory: Sensation is intact. Motor: Motor function is intact.       Coordination: Coordination normal. Psychiatric:         Attention and Perception: Attention and perception normal.         Mood and Affect: Mood normal.         Speech: Speech normal.         Behavior: Behavior normal. Behavior is cooperative. Thought Content: Thought content normal.         Cognition and Memory: Cognition normal.         Judgment: Judgment normal.         ASSESSMENT/PLAN:  1. Type 2 diabetes mellitus without complication, without long-term current use of insulin (HCC)    - POCT glycosylated hemoglobin (Hb A1C)  Results for orders placed or performed in visit on 10/07/20   POCT glycosylated hemoglobin (Hb A1C)   Result Value Ref Range    Hemoglobin A1C 5.4 %         2. Paroxysmal atrial fibrillation (HCC)  Stable at this time on current medical regimen    3. Anxiety  Stable at this time on current medical regimen    4. Atrial fibrillation, unspecified type ([facility] [address]  Stable at this time on current medical regimen    5. Hyperlipidemia, unspecified hyperlipidemia type  Stable at this time on current medical regimen    6. Hypertension, unspecified type  Stable at this time on current medical regimen    7. Luong hemangioma  -Advised if she is concerned we can do a referral to a dermatologist she refused at this time    8. Need for influenza vaccination    - INFLUENZA, QUADV, ADJUVANTED, 65 YRS =, IM, PF, PREFILL SYR, 0.5ML (FLUAD)    Follow up if symptoms do not improve or worsen. If the patient becomes short of breath go straight to the ER or call 911. Return in about 6 months (around 4/7/2021). An electronic signature was used to authenticate this note.     --IFTIKHAR Torres - CNP on 10/7/2020 at 12:57 PM

## 2020-10-15 RX ORDER — OMEPRAZOLE 40 MG/1
40 CAPSULE, DELAYED RELEASE ORAL DAILY
Qty: 90 CAPSULE | Refills: 1 | Status: SHIPPED | OUTPATIENT
Start: 2020-10-15 | End: 2021-04-12

## 2020-10-15 RX ORDER — ATORVASTATIN CALCIUM 40 MG/1
40 TABLET, FILM COATED ORAL DAILY
Qty: 90 TABLET | Refills: 1 | Status: SHIPPED | OUTPATIENT
Start: 2020-10-15 | End: 2021-04-12

## 2020-11-03 RX ORDER — FLUOXETINE HYDROCHLORIDE 40 MG/1
CAPSULE ORAL
Qty: 30 CAPSULE | Refills: 0 | Status: SHIPPED | OUTPATIENT
Start: 2020-11-03 | End: 2020-12-07

## 2020-11-03 NOTE — TELEPHONE ENCOUNTER
Future appt scheduled 0 scheduled-Return in about 6 months (around 4/7/2021              Last appt 10/07/2020      Last Written 03/17/2020    FLUoxetine (PROZAC) 40 MG capsule  #30  5 RF

## 2020-12-07 RX ORDER — FLUOXETINE HYDROCHLORIDE 40 MG/1
CAPSULE ORAL
Qty: 30 CAPSULE | Refills: 0 | Status: SHIPPED | OUTPATIENT
Start: 2020-12-07 | End: 2021-01-04

## 2020-12-07 NOTE — TELEPHONE ENCOUNTER
Future appt scheduled 0 scheduled-Return in about 6 months (around 4/7/2021).               Last appt 10/07/2020      Last Written 11/0/2020    FLUoxetine (PROZAC) 40 MG capsule   #30  0 RF

## 2020-12-31 DIAGNOSIS — F41.9 ANXIETY: ICD-10-CM

## 2021-01-04 RX ORDER — FLUOXETINE HYDROCHLORIDE 40 MG/1
CAPSULE ORAL
Qty: 30 CAPSULE | Refills: 1 | Status: SHIPPED | OUTPATIENT
Start: 2021-01-04 | End: 2021-03-03

## 2021-02-23 DIAGNOSIS — N39.46 MIXED INCONTINENCE: ICD-10-CM

## 2021-02-24 RX ORDER — OXYBUTYNIN CHLORIDE 5 MG/1
TABLET ORAL
Qty: 90 TABLET | Refills: 3 | Status: SHIPPED | OUTPATIENT
Start: 2021-02-24 | End: 2021-09-06

## 2021-03-03 DIAGNOSIS — F41.9 ANXIETY: ICD-10-CM

## 2021-03-03 RX ORDER — FLUOXETINE HYDROCHLORIDE 40 MG/1
CAPSULE ORAL
Qty: 30 CAPSULE | Refills: 0 | Status: SHIPPED | OUTPATIENT
Start: 2021-03-03 | End: 2021-04-05

## 2021-04-05 DIAGNOSIS — F41.9 ANXIETY: ICD-10-CM

## 2021-04-05 RX ORDER — FLUOXETINE HYDROCHLORIDE 40 MG/1
CAPSULE ORAL
Qty: 30 CAPSULE | Refills: 5 | Status: SHIPPED | OUTPATIENT
Start: 2021-04-05 | End: 2021-09-20 | Stop reason: SDUPTHER

## 2021-04-12 DIAGNOSIS — E78.00 PURE HYPERCHOLESTEROLEMIA: ICD-10-CM

## 2021-04-12 DIAGNOSIS — K21.9 GASTROESOPHAGEAL REFLUX DISEASE WITHOUT ESOPHAGITIS: ICD-10-CM

## 2021-04-12 RX ORDER — ATORVASTATIN CALCIUM 40 MG/1
TABLET, FILM COATED ORAL
Qty: 90 TABLET | Refills: 3 | Status: SHIPPED | OUTPATIENT
Start: 2021-04-12 | End: 2022-04-12 | Stop reason: SDUPTHER

## 2021-04-12 RX ORDER — OMEPRAZOLE 40 MG/1
CAPSULE, DELAYED RELEASE ORAL
Qty: 90 CAPSULE | Refills: 3 | Status: SHIPPED | OUTPATIENT
Start: 2021-04-12 | End: 2022-04-12 | Stop reason: SDUPTHER

## 2021-07-01 ENCOUNTER — TELEPHONE (OUTPATIENT)
Dept: FAMILY MEDICINE CLINIC | Age: 76
End: 2021-07-01

## 2021-09-03 DIAGNOSIS — N39.46 MIXED INCONTINENCE: ICD-10-CM

## 2021-09-06 RX ORDER — OXYBUTYNIN CHLORIDE 5 MG/1
TABLET ORAL
Qty: 90 TABLET | Refills: 0 | Status: SHIPPED | OUTPATIENT
Start: 2021-09-06 | End: 2021-09-20 | Stop reason: SDUPTHER

## 2021-09-17 ENCOUNTER — TELEPHONE (OUTPATIENT)
Dept: FAMILY MEDICINE CLINIC | Age: 76
End: 2021-09-17

## 2021-09-20 ENCOUNTER — OFFICE VISIT (OUTPATIENT)
Dept: FAMILY MEDICINE CLINIC | Age: 76
End: 2021-09-20
Payer: MEDICARE

## 2021-09-20 VITALS
BODY MASS INDEX: 21.1 KG/M2 | SYSTOLIC BLOOD PRESSURE: 104 MMHG | DIASTOLIC BLOOD PRESSURE: 70 MMHG | WEIGHT: 139.8 LBS | OXYGEN SATURATION: 96 % | HEART RATE: 75 BPM

## 2021-09-20 DIAGNOSIS — Z91.81 AT HIGH RISK FOR FALLS: ICD-10-CM

## 2021-09-20 DIAGNOSIS — R42 LIGHT HEADED: ICD-10-CM

## 2021-09-20 DIAGNOSIS — R42 DIZZINESS: ICD-10-CM

## 2021-09-20 DIAGNOSIS — I10 HYPERTENSION, UNSPECIFIED TYPE: ICD-10-CM

## 2021-09-20 DIAGNOSIS — I48.0 PAROXYSMAL ATRIAL FIBRILLATION (HCC): ICD-10-CM

## 2021-09-20 DIAGNOSIS — E78.5 HYPERLIPIDEMIA, UNSPECIFIED HYPERLIPIDEMIA TYPE: ICD-10-CM

## 2021-09-20 DIAGNOSIS — E11.9 TYPE 2 DIABETES MELLITUS WITHOUT COMPLICATION, WITHOUT LONG-TERM CURRENT USE OF INSULIN (HCC): Primary | ICD-10-CM

## 2021-09-20 DIAGNOSIS — N39.46 MIXED INCONTINENCE: ICD-10-CM

## 2021-09-20 DIAGNOSIS — F41.9 ANXIETY: ICD-10-CM

## 2021-09-20 PROCEDURE — G0008 ADMIN INFLUENZA VIRUS VAC: HCPCS | Performed by: NURSE PRACTITIONER

## 2021-09-20 PROCEDURE — 99214 OFFICE O/P EST MOD 30 MIN: CPT | Performed by: NURSE PRACTITIONER

## 2021-09-20 PROCEDURE — 90694 VACC AIIV4 NO PRSRV 0.5ML IM: CPT | Performed by: NURSE PRACTITIONER

## 2021-09-20 PROCEDURE — 36415 COLL VENOUS BLD VENIPUNCTURE: CPT | Performed by: NURSE PRACTITIONER

## 2021-09-20 RX ORDER — OXYBUTYNIN CHLORIDE 5 MG/1
5 TABLET ORAL 3 TIMES DAILY
Qty: 90 TABLET | Refills: 0 | Status: SHIPPED | OUTPATIENT
Start: 2021-09-20 | End: 2021-12-09

## 2021-09-20 RX ORDER — FLUOXETINE HYDROCHLORIDE 40 MG/1
40 CAPSULE ORAL DAILY
Qty: 90 CAPSULE | Refills: 3 | Status: SHIPPED | OUTPATIENT
Start: 2021-09-20 | End: 2022-10-04 | Stop reason: SDUPTHER

## 2021-09-20 ASSESSMENT — ENCOUNTER SYMPTOMS
VOMITING: 0
EYE DISCHARGE: 0
TROUBLE SWALLOWING: 0
EYE ITCHING: 0
SINUS PRESSURE: 0
CHEST TIGHTNESS: 0
WHEEZING: 0
ABDOMINAL PAIN: 0
CONSTIPATION: 0
RHINORRHEA: 0
EYE PAIN: 0
EYE REDNESS: 0
NAUSEA: 0
CHOKING: 0
SORE THROAT: 0
COUGH: 0
SINUS PAIN: 0
STRIDOR: 0
VOICE CHANGE: 0
BLOOD IN STOOL: 0
COLOR CHANGE: 1
DIARRHEA: 0
PHOTOPHOBIA: 0
BACK PAIN: 0
SHORTNESS OF BREATH: 0

## 2021-09-20 ASSESSMENT — PATIENT HEALTH QUESTIONNAIRE - PHQ9
2. FEELING DOWN, DEPRESSED OR HOPELESS: 1
SUM OF ALL RESPONSES TO PHQ QUESTIONS 1-9: 1
SUM OF ALL RESPONSES TO PHQ9 QUESTIONS 1 & 2: 1
SUM OF ALL RESPONSES TO PHQ QUESTIONS 1-9: 1
1. LITTLE INTEREST OR PLEASURE IN DOING THINGS: 0
SUM OF ALL RESPONSES TO PHQ QUESTIONS 1-9: 1

## 2021-09-20 NOTE — PROGRESS NOTES
shortness of breath, wheezing and stridor. Cardiovascular: Negative for chest pain, palpitations and leg swelling. Gastrointestinal: Negative for abdominal pain, blood in stool, constipation, diarrhea, nausea and vomiting. Endocrine: Negative for cold intolerance, heat intolerance, polydipsia and polyuria. Genitourinary: Negative for difficulty urinating, dysuria, enuresis, flank pain, frequency, hematuria and urgency. Musculoskeletal: Negative for back pain, gait problem, joint swelling, neck pain and neck stiffness. Skin: Positive for color change (fall weeks ago). Negative for pallor, rash and wound. Allergic/Immunologic: Negative for environmental allergies and food allergies. Neurological: Positive for dizziness and light-headedness (losing balance). Negative for tremors, syncope, speech difficulty, weakness, numbness and headaches. Hematological: Negative for adenopathy. Does not bruise/bleed easily. Psychiatric/Behavioral: Negative for agitation, behavioral problems, confusion, decreased concentration, dysphoric mood, hallucinations, self-injury, sleep disturbance and suicidal ideas. The patient is not nervous/anxious and is not hyperactive. Hemoglobin A1C (%)   Date Value   10/07/2020 5.4     Microscopic Examination (no units)   Date Value   02/08/2019 YES     LDL Calculated (mg/dL)   Date Value   07/16/2020 98       Past Medical History:   Diagnosis Date    Anemia     Arthritis     Atrial fibrillation ([address]     Back pain     Diabetes mellitus ([address]     diet controlled, does not take medication    Hyperlipidemia     Hypertension     Total hip replacement 8/10/2011       Family History   Problem Relation Age of Onset    Cancer Mother         breast    Cancer Father         colon       Social History     Socioeconomic History    Marital status:       Spouse name: Not on file    Number of children: Not on file    Years of education: Not on file    Highest education level: Not on file   Occupational History    Not on file   Tobacco Use    Smoking status: Former Smoker     Packs/day: 0.50     Years: 44.00     Pack years: 22.00     Types: Cigarettes     Start date:      Quit date:      Years since quittin.7    Smokeless tobacco: Never Used   Vaping Use    Vaping Use: Never used   Substance and Sexual Activity    Alcohol use: No     Alcohol/week: 0.0 standard drinks    Drug use: No    Sexual activity: Not Currently   Other Topics Concern    Not on file   Social History Narrative    2017 lives by [place]; for 7 yrs;retired,former-;     Social Determinants of Health     Financial Resource Strain:     Difficulty of Paying Living Expenses:    Food Insecurity:     Worried About Running Out of Food in the Last Year:     [address] in the Last Year:    Transportation Needs:     Lack of Transportation (Medical):  Lack of Transportation (Non-Medical):    Physical Activity:     Days of Exercise per Week:     Minutes of Exercise per Session:    Stress:     Feeling of Stress :    Social Connections:     Frequency of Communication with Friends and Family:     Frequency of Social Gatherings with Friends and Family:     Attends [employer] Services:     Active Member of Clubs or Organizations:     Attends Club or Organization Meetings:     Marital Status:    Intimate Partner Violence:     Fear of Current or Ex-Partner:     Emotionally Abused:     Physically Abused:     Sexually Abused:        Prior to Visit Medications    Medication Sig Taking?  Authorizing Provider   FLUoxetine (PROZAC) 40 MG capsule Take 1 capsule by mouth daily Yes Liss Crass, APRN - CNP   oxybutynin (DITROPAN) 5 MG tablet Take 1 tablet by mouth 3 times daily Yes Liss Crass, APRN - CNP   atorvastatin (LIPITOR) 40 MG tablet TAKE ONE TABLET BY MOUTH EVERY DAY Yes Mansfield Crass, APRN - CNP   omeprazole (PRILOSEC) 40 MG delayed release capsule TAKE ONE CAPSULE BY Pulmonary effort is normal. No respiratory distress. Breath sounds: Normal breath sounds. No stridor. No decreased breath sounds, wheezing, rhonchi or rales. Musculoskeletal:         General: Swelling and signs of injury present. No tenderness or deformity. Normal range of motion. Cervical back: Normal range of motion. Right lower leg: No edema. Left lower leg: No edema. Feet:    Feet:      Right foot:      Protective Sensation: 7 sites tested. 7 sites sensed. Left foot:      Protective Sensation: 7 sites tested. 7 sites sensed. Skin integrity: Skin integrity normal.   Lymphadenopathy:      Cervical: No cervical adenopathy. Skin:     General: Skin is warm and dry. Capillary Refill: Capillary refill takes less than 2 seconds. Findings: No erythema or rash. Neurological:      Mental Status: She is alert and oriented to person, place, and time. Sensory: Sensation is intact. Motor: Motor function is intact. Coordination: Coordination normal.   Psychiatric:         Attention and Perception: Attention and perception normal.         Mood and Affect: Mood normal.         Speech: Speech normal.         Behavior: Behavior normal. Behavior is cooperative. Thought Content: Thought content normal.         Cognition and Memory: Cognition normal.         Judgment: Judgment normal.       ASSESSMENT/PLAN:    1. Type 2 diabetes mellitus without complication, without long-term current use of insulin ([facility])    - MICROALBUMIN / CREATININE URINE RATIO  -  DIABETES FOOT EXAM    2. Paroxysmal atrial fibrillation ([facility] [address]    - She will follow up with cardiology    3. Hyperlipidemia, unspecified hyperlipidemia type    - Lipid Panel    4. Hypertension, unspecified type    - MICROALBUMIN / CREATININE URINE RATIO  -  DIABETES FOOT EXAM    5. Anxiety    - FLUoxetine (PROZAC) 40 MG capsule; Take 1 capsule by mouth daily  Dispense: 90 capsule; Refill: 3    6.  Mixed incontinence    - oxybutynin (DITROPAN) 5 MG tablet; Take 1 tablet by mouth 3 times daily  Dispense: 90 tablet; Refill: 0  - HITESH - Edda Juarez MD, Urology, [facility]    7. At high risk for falls    8. Light headed    9. Dizziness    Will follow up with lab results. She has an appointment with Brock Levin family nurse practitioner in [facility] ([facility] in about 1 month. She needs refills on her meds until then. Advised to follow-up with cardiology due to suspected hypotension on multiple medications. She is had episodes of falling and lightheadedness. She verbalized understanding. Also advised to follow-up with urology since she feels like she is having some side effects from the oxybutynin and would like to discuss other medication options. Return in about 3 months (around 12/20/2021). On the basis of positive falls risk screening, assessment and plan is as follows: home safety tips provided.

## 2021-09-21 LAB
CHOLESTEROL, TOTAL: 162 MG/DL (ref 0–199)
CREATININE URINE: 150.4 MG/DL (ref 28–259)
HDLC SERPL-MCNC: 46 MG/DL (ref 40–60)
LDL CHOLESTEROL CALCULATED: 87 MG/DL
MICROALBUMIN UR-MCNC: 3.3 MG/DL
MICROALBUMIN/CREAT UR-RTO: 21.9 MG/G (ref 0–30)
TRIGL SERPL-MCNC: 145 MG/DL (ref 0–150)
VLDLC SERPL CALC-MCNC: 29 MG/DL

## 2021-11-23 ENCOUNTER — OFFICE VISIT (OUTPATIENT)
Dept: FAMILY MEDICINE CLINIC | Age: 76
End: 2021-11-23
Payer: MEDICARE

## 2021-11-23 VITALS
OXYGEN SATURATION: 92 % | BODY MASS INDEX: 20.53 KG/M2 | HEART RATE: 88 BPM | SYSTOLIC BLOOD PRESSURE: 112 MMHG | DIASTOLIC BLOOD PRESSURE: 76 MMHG | WEIGHT: 136 LBS

## 2021-11-23 VITALS
HEART RATE: 88 BPM | SYSTOLIC BLOOD PRESSURE: 112 MMHG | DIASTOLIC BLOOD PRESSURE: 76 MMHG | WEIGHT: 136 LBS | OXYGEN SATURATION: 92 % | BODY MASS INDEX: 20.53 KG/M2

## 2021-11-23 DIAGNOSIS — I10 HYPERTENSION, UNSPECIFIED TYPE: ICD-10-CM

## 2021-11-23 DIAGNOSIS — Z76.89 ENCOUNTER TO ESTABLISH CARE WITH NEW DOCTOR: ICD-10-CM

## 2021-11-23 DIAGNOSIS — N39.46 MIXED INCONTINENCE: ICD-10-CM

## 2021-11-23 DIAGNOSIS — E11.9 TYPE 2 DIABETES MELLITUS WITHOUT COMPLICATION, WITHOUT LONG-TERM CURRENT USE OF INSULIN (HCC): Primary | ICD-10-CM

## 2021-11-23 DIAGNOSIS — Z00.00 ROUTINE GENERAL MEDICAL EXAMINATION AT A HEALTH CARE FACILITY: Primary | ICD-10-CM

## 2021-11-23 DIAGNOSIS — K21.9 GASTROESOPHAGEAL REFLUX DISEASE WITHOUT ESOPHAGITIS: ICD-10-CM

## 2021-11-23 DIAGNOSIS — I48.0 PAROXYSMAL ATRIAL FIBRILLATION (HCC): ICD-10-CM

## 2021-11-23 DIAGNOSIS — E11.9 TYPE 2 DIABETES MELLITUS WITHOUT COMPLICATION, WITHOUT LONG-TERM CURRENT USE OF INSULIN (HCC): ICD-10-CM

## 2021-11-23 DIAGNOSIS — Z71.89 ACP (ADVANCE CARE PLANNING): ICD-10-CM

## 2021-11-23 DIAGNOSIS — E78.5 HYPERLIPIDEMIA, UNSPECIFIED HYPERLIPIDEMIA TYPE: ICD-10-CM

## 2021-11-23 DIAGNOSIS — M51.36 DEGENERATIVE DISC DISEASE, LUMBAR: ICD-10-CM

## 2021-11-23 DIAGNOSIS — Z86.010 HX OF ADENOMATOUS COLONIC POLYPS: ICD-10-CM

## 2021-11-23 LAB — HBA1C MFR BLD: 6 %

## 2021-11-23 PROCEDURE — 83036 HEMOGLOBIN GLYCOSYLATED A1C: CPT | Performed by: NURSE PRACTITIONER

## 2021-11-23 PROCEDURE — 36415 COLL VENOUS BLD VENIPUNCTURE: CPT | Performed by: NURSE PRACTITIONER

## 2021-11-23 PROCEDURE — G0438 PPPS, INITIAL VISIT: HCPCS | Performed by: NURSE PRACTITIONER

## 2021-11-23 PROCEDURE — 99203 OFFICE O/P NEW LOW 30 MIN: CPT | Performed by: NURSE PRACTITIONER

## 2021-11-23 RX ORDER — TIZANIDINE 2 MG/1
2 TABLET ORAL EVERY 8 HOURS PRN
Qty: 60 TABLET | Refills: 0 | Status: SHIPPED | OUTPATIENT
Start: 2021-11-23 | End: 2021-12-23

## 2021-11-23 ASSESSMENT — PATIENT HEALTH QUESTIONNAIRE - PHQ9
SUM OF ALL RESPONSES TO PHQ QUESTIONS 1-9: 2
1. LITTLE INTEREST OR PLEASURE IN DOING THINGS: 1
SUM OF ALL RESPONSES TO PHQ QUESTIONS 1-9: 2
SUM OF ALL RESPONSES TO PHQ QUESTIONS 1-9: 2
SUM OF ALL RESPONSES TO PHQ9 QUESTIONS 1 & 2: 2
2. FEELING DOWN, DEPRESSED OR HOPELESS: 1

## 2021-11-23 ASSESSMENT — LIFESTYLE VARIABLES: HOW OFTEN DO YOU HAVE A DRINK CONTAINING ALCOHOL: 0

## 2021-11-23 NOTE — PROGRESS NOTES
Medicare Annual Wellness Visit  Name: Laura Knox Date: 2021   MRN: <[id number]> Sex: Female   Age: 70 y.o. Ethnicity: Non- / Non    : 1945 Race: White (non-)      Eriberto Regalado is here for Medicare AWV, Hyperlipidemia, Hypertension, Gastroesophageal Reflux, Fall (she fell earlier in the summer and hurt her back and she hit her head ), Incontinence, Diabetes (does not check sugars ), and Anxiety    Screenings for behavioral, psychosocial and functional/safety risks, and cognitive dysfunction are all negative except as indicated below. These results, as well as other patient data from the [address] form, are documented in Flowsheets linked to this Encounter. Presenting today to establish care with new PCP. Prior PCP left practice. Chronic Back Pain >10 years located upper lumbar spine across her entire back. 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 Describes pain as throbbing 10/10 worse with activity and prolonged periods of rest.  No saddle/ leg parasthesias. No constipation/ diarrhea. Previously had Lumbar Xrays and possibly MRIs but those were >5 years. Previously on narcotics and muscle relaxers by a PCP, however this was stopped when stricter narcotic control were put in place. Thinks she may have been on Gabapentin but does not remember. Would like referral to Pain Mgmt. No prior KIMMY, chiropractic or PT care. DM: Not checking BS. No hypoglycemic symptoms. No foot/ eye complaints. Annual Eye exam not UTD. Diet controlled. Paroxysmal Afib/ HTN/ HLD: No CP/ SOB, leg swelling. No leg myalgias, sensory loss. Urinary Incontinence: Well controlled with Ditropan. Did not see urology as discussed with last PCP. Does feel it dehydrates her.         Preventive Care:   Declining LDCT, Mammogram.  Not planning to schedule Annual eye exam.  Colon cancer screening due 2022    Lab Results   Component Value Date    LABA1C 6.0 11/23/2021    LABA1C 5.4 10/07/2020    LABA1C 5.9 10/14/2019     Lab Results   Component Value Date    .6 10/14/2019        Lab Results   Component Value Date    LABMICR 3.30 (H) 09/20/2021         Allergies   Allergen Reactions    Naproxen Other (See Comments)     Oral ulcers      Lisinopril Other (See Comments)     Cough           Prior to Visit Medications    Medication Sig Taking?  Authorizing Provider   tiZANidine (ZANAFLEX) 2 MG tablet Take 1 tablet by mouth every 8 hours as needed (back pain) Yes Anna Yoder APRN - CNP   FLUoxetine (PROZAC) 40 MG capsule Take 1 capsule by mouth daily Yes Klaudia Mins, APRN - CNP   oxybutynin (DITROPAN) 5 MG tablet Take 1 tablet by mouth 3 times daily Yes Klaudia Mins, APRN - CNP   atorvastatin (LIPITOR) 40 MG tablet TAKE ONE TABLET BY MOUTH EVERY DAY Yes Klaudia Mins, APRN - CNP   omeprazole (PRILOSEC) 40 MG delayed release capsule TAKE ONE CAPSULE BY MOUTH EVERY DAY Yes Klaudia Mins, APRN - CNP   ELIQUIS 5 MG TABS tablet Take 1 tablet by mouth 2 times daily  Patient taking differently: Take 5 mg by mouth 2 times daily  Yes Beatris Fernandes MD   metoprolol (TOPROL-XL) 50 MG XL tablet Take 50 mg by mouth daily  Yes Hank Kidd MD   propafenone (RYTHMOL SR) 225 MG SR capsule Take 150 mg by mouth every 8 hours  Yes Hank Kidd MD         Past Medical History:   Diagnosis Date    Anemia     Arthritis     Atrial fibrillation ([address]     Back pain     Diabetes mellitus ([address]     diet controlled, does not take medication    Hyperlipidemia     Hypertension     Total hip replacement 8/10/2011       Past Surgical History:   Procedure Laterality Date    CARDIOVERSION      CATARACT REMOVAL      CHOLECYSTECTOMY      COLONOSCOPY  2007    normal per pt    COLONOSCOPY  08/16/2017    colon polyps    DENTAL SURGERY      full mouth extractions    JOINT REPLACEMENT  8-    Right total hip replacement, anterior approach     NASAL SEPTUM SURGERY      SINUS ENDOSCOPY      TONSILLECTOMY      TUBAL LIGATION           Family History   Problem Relation Age of Onset    Cancer Mother         breast    Cancer Father         colon       CareTeam (Including outside providers/suppliers regularly involved in providing care):   Patient Care Team:  IFTIKHAR Patterson CNP as PCP - General (Nurse Practitioner)  IFTIKHAR Patterson CNP as PCP - [facility] Empaneled Provider    Wt Readings from Last 3 Encounters:   11/23/21 136 lb (61.7 kg)   11/23/21 136 lb (61.7 kg)   09/20/21 139 lb 12.8 oz (63.4 kg)     Vitals:    11/23/21 1535   BP: 112/76   Pulse: 88   SpO2: 92%   Weight: 136 lb (61.7 kg)     Body mass index is 20.53 kg/m². Based upon direct observation of the patient, evaluation of cognition reveals recent and remote memory intact.     General Appearance: alert and oriented to person, place and time, well-developed and well-nourished, in no acute distress  Skin: warm and dry, no rash or erythema  Head: normocephalic and atraumatic  Eyes: pupils equal, round, and reactive to light, extraocular eye movements intact, conjunctivae normal  ENT: tympanic membrane, external ear and ear canal normal bilaterally, oropharynx clear and moist with normal mucous membranes  Neck: neck supple and non tender without mass, no thyromegaly or thyroid nodules, no cervical lymphadenopathy   Pulmonary/Chest: clear to auscultation bilaterally- no wheezes, rales or rhonchi, normal air movement, no respiratory distress  Cardiovascular: normal rate, normal S1 and S2, no gallops, intact distal pulses and no carotid bruits  Abdomen: soft, non-tender, non-distended, normal bowel sounds, no masses or organomegaly  Pelvic: examination not indicated  Breast: breasts appear normal, no suspicious masses, no skin or nipple changes or axillary nodes, patient declines to have breast exam  Genitourinary: Not examined  Extremities: no cyanosis, no clubbing and no edema  Musculoskeletal: normal range of motion, no joint swelling, deformity or tenderness, tenderness present over  lumbar spine and paraspinal muscle tenderness present-  bilateral lumbar  Neurologic: gait and coordination normal and speech normal    Patient's complete Health Risk Assessment and screening values have been reviewed and are found in Flowsheets. The following problems were reviewed today and where indicated follow up appointments were made and/or referrals ordered. Positive Risk Factor Screenings with Interventions:     Fall Risk:  Timed Up and Go Test > 12 seconds? (Complete if either Fall Risk answers are Yes): no  2 or more falls in past year?: (!) yes  Fall with injury in past year?: (!) yes  Fall Risk Interventions:    · Home safety tips provided          General Health and ACP:  General  In general, how would you say your health is?: Good  In the past 7 days, have you experienced any of the following?  New or Increased Pain, New or Increased Fatigue, Loneliness, Social Isolation, Stress or Anger?: (!) New or Increased Fatigue, Loneliness, Anger  Do you get the social and emotional support that you need?: Yes  Do you have a Living Will?: (!) No  Advance Directives     Power of  Living Will ACP-Advance Directive ACP-Power of     Not on File Filed on 07/06/13 Filed Not on File      General Health Risk Interventions:  · Fatigue: patient declines any further evaluation/treatment for this issue  · Loneliness: patient declines any further intervention for this issue  · Anger: patient declines any further evaluation/treatment for this issue    Health Habits/Nutrition:  Health Habits/Nutrition  Do you exercise for at least 20 minutes 2-3 times per week?: (!) No  Have you lost any weight without trying in the past 3 months?: No  Do you eat only one meal per day?: (!) Yes  Have you seen the dentist within the past year?: N/A - wear dentures     Health ([id number]) 06/08/2009, 04/01/2019    Tdap (Boostrix, Adacel) 01/12/2008, 11/11/2014        Health Maintenance   Topic Date Due    Diabetic retinal exam  Never done    Shingles Vaccine (1 of 2) Never done    Low dose CT lung screening  Never done    Breast cancer screen  08/04/2019   [place] Annual Wellness Visit (AWV)  Never done    COVID-19 Vaccine (3 - Booster for Moderna series) 11/11/2021    Colon cancer screen colonoscopy  08/16/2022    Diabetic foot exam  09/20/2022    Diabetic microalbuminuria test  09/20/2022    Lipid screen  09/20/2022    A1C test (Diabetic or Prediabetic)  11/23/2022    DTaP/Tdap/Td vaccine (3 - Td or Tdap) 11/11/2024    Flu vaccine  Completed    Pneumococcal 65+ years Vaccine  Completed    DEXA (modify frequency per FRAX score)  Addressed    Hepatitis C screen  Addressed    Hepatitis A vaccine  Aged Out    Hib vaccine  Aged Out    Meningococcal (ACWY) vaccine  Aged Out     Recommendations for [employer] Due: see orders and patient instructions/AVS.  . Recommended screening schedule for the next 5-10 years is provided to the patient in written form: see Patient Instructions/AVS.    Nelly Darrion was seen today for medicare awv, hyperlipidemia, hypertension, gastroesophageal reflux, fall, incontinence, diabetes and anxiety. Diagnoses and all orders for this visit:    Routine general medical examination at a health care facility  Encouraged self care  Encouraged 30-45 minutes daily physical exercise as tolearted  Encouraged portion control, mixture of protein, carbohydrates, fruits/ veggies.    Encouraged Sleep Hygiene    Encounter to establish care with new doctor  Declining all preventive care today    Paroxysmal atrial fibrillation ([address]  Stable today  Continue Metoprolol/ Rhythmol  Continue Eliquis    Type 2 diabetes mellitus without complication, without long-term current use of insulin (HCC)  Stable, last A1C 6.0%  Diet controlled  Encouraged Annual eye witness and provide a signed copy to the practice office. Time spent (minutes): 5     Cardiovascular Disease Risk Counseling: Assessed the patient's risk to develop cardiovascular disease and reviewed main risk factors. Reviewed steps to reduce disease risk including:   · Quitting tobacco use, reducing amount smoked, or not starting the habit  · Making healthy food choices  · Being physically active and gradualy increasing activity levels   · Reduce weight and determine a healthy BMI goal  · Monitor blood pressure and treat if higher than 140/90 mmHg  · Maintain blood total cholesterol levels under 5 mmol/l or 190 mg/dl  · Maintain LDL cholesterol levels under 3.0 mmol/l or 115 mg/dl   · Control blood glucose levels  · Consider taking aspirin (75 mg daily), once blood pressure is controlled   Provided a follow up plan. Time spent (minutes): 2    [id number] Score for Atrial Fibrillation Stroke Risk   Risk   Factors  Component Value   C CHF No 0   H HTN Yes 1   A2 Age >= 76 No,  (80 y.o.) 0   D DM Yes 1   S2 Prior Stroke/TIA No 0   V Vascular Disease No 0   A Age 74-69 Yes,  (80 y.o.) 1   Sc Sex female 1    [id number]  Score  4   Score last updated 11/23/21 0:72 PM EST    Click here for a link to the UpToDate guideline \"Atrial Fibrillation: Anticoagulation therapy to prevent embolization    Disclaimer: Risk Score calculation is dependent on accuracy of patient problem list and past encounter diagnosis.

## 2021-11-23 NOTE — PATIENT INSTRUCTIONS
Patient Education        mirabegron  Pronunciation:  MAXI victor  Brand:  Myrbetriq  What is the most important information I should know about mirabegron? You should not use mirabegron if you have uncontrolled hypertension (high blood pressure). What is mirabegron? Mirabegron is used in adults to treat overactive bladder with symptoms of frequent or urgent urination and urinary incontinence. Mirabegron is sometimes used together with another medicine called solifenacin ([employer]). Mirabegron is used in children to treat neurogenic detrusor overactivity (NDO). Urinary incontinence caused by NDO is related to permanent nerve damage from conditions such as multiple sclerosis or spinal injury. Mirabegron tablets may be used in children at least 1years old. Mirabegron granules are for use in children at least 1years old who also weigh at least 77 pounds (35 kilograms). Mirabegron may also be used for purposes not listed in this medication guide. What should I discuss with my healthcare provider before taking mirabegron? You should not use mirabegron if you are allergic to it. Tell your doctor if you have ever had:  · high blood pressure;  · a bladder obstruction;  · trouble emptying your bladder (very little urine or a weak stream of urine);  · kidney disease; or  · liver disease. Tell your doctor if you are pregnant or breastfeeding. Mirabegron should not be given to a child younger than 1years old. How should I take mirabegron? Follow all directions on your prescription label and read all medication guides or instruction sheets. Use the medicine exactly as directed. If you take mirabegron with solifenacin, take both medicines at the same time each day. Take the mirabegron tablet with a full glass of water. Swallow the tablet whole and do not crush, chew, or break it. An adult may take a mirabegron tablet with or without food. However, a child should take the tablet with food.   Both adults and children should take mirabegron granules with food. A pharmacist will mix mirabegron granules into a suspension (liquid) before you receive the medicine. Shake the suspension before you measure a dose. Use the dosing syringe provided, or use a medicine dose-measuring device (not a kitchen spoon). If the suspension will not be used for 2 days or longer, shake the bottle vigorously for 1 minute each day to keep the granules thoroughly mixed. Read and carefully follow any Instructions for Use provided with your medicine. Ask your doctor or pharmacist if you do not understand these instructions. Your dose needs may change if you switch from using mirabegron tablets to using the suspension. Avoid medication errors by using only the form and strength your doctor prescribes. Your blood pressure will need to be checked often. It may take up to 8 weeks before your symptoms improve. Keep using the medication as directed and tell your doctor if your symptoms do not improve. Store at room temperature away from moisture and heat. Throw away any unused mirabegron suspension  after the expiration date on the medicine label has passed. What happens if I miss a dose? Use the medicine as soon as you can, but skip the missed dose if you are more than 12 hours late for the dose. Do not use two doses at one time. What happens if I overdose? Seek emergency medical attention or call the Poison Help line at 1-350.247.1903. An overdose may cause pounding heartbeats or fluttering in your chest.  What should I avoid while taking mirabegron? Follow your doctor's instructions about any restrictions on food, beverages, or activity. What are the possible side effects of mirabegron? Stop using mirabegron and get emergency medical help if you have signs of an allergic reaction: hives; difficult breathing; swelling of your face, lips, tongue, or throat.   Call your doctor at once if you have:  · pain or burning when you urinate; or  · dangerously high blood pressure --severe headache, blurred vision, pounding in your neck or ears. Common side effects may include:  · painful urination;  · increased blood pressure;  · sinus pain, sore throat;  · constipation;  · headache; or  · dry mouth or fast heartbeats (when taken with solifenacin). This is not a complete list of side effects and others may occur. Call your doctor for medical advice about side effects. You may report side effects to FDA at 0-293-FDA-7898. What other drugs will affect mirabegron? Tell your doctor about all your other medicines, especially:  · digoxin;  · flecainide;  · propafenone;  · solifenacin; or  · thioridazine. This list is not complete. Other drugs may affect mirabegron, including prescription and over-the-counter medicines, vitamins, and herbal products. Not all possible drug interactions are listed here. Where can I get more information? Your pharmacist can provide more information about mirabegron. Remember, keep this and all other medicines out of the reach of children, never share your medicines with others, and use this medication only for the indication prescribed. Every effort has been made to ensure that the information provided by [address] is accurate, up-to-date, and complete, but no guarantee is made to that effect. Drug information contained herein may be time sensitive. [facility] information has been compiled for use by healthcare practitioners and consumers in the [place] and therefore [employer] does not warrant that uses outside of the [place] are appropriate, unless specifically indicated otherwise. [facility] drug information does not endorse drugs, diagnose patients or recommend therapy.  [facility] drug information is an informational resource designed to assist licensed healthcare practitioners in caring for their patients and/or to serve consumers viewing this service as a supplement to, and not a substitute for, the expertise, skill, knowledge and judgment of healthcare practitioners. The absence of a warning for a given drug or drug combination in no way should be construed to indicate that the drug or drug combination is safe, effective or appropriate for any given patient. [facility] does not assume any responsibility for any aspect of healthcare administered with the aid of information [facility] provides. The information contained herein is not intended to cover all possible uses, directions, precautions, warnings, drug interactions, allergic reactions, or adverse effects. If you have questions about the drugs you are taking, check with your doctor, nurse or pharmacist.  Copyright 5699-4819 [address]: 4.01. Revision date: 4/5/2021. Care instructions adapted under license by [facility] If you have questions about a medical condition or this instruction, always ask your healthcare professional. [place] [address] any warranty or liability for your use of this information.

## 2021-11-23 NOTE — PATIENT INSTRUCTIONS
Advance Directives: Care Instructions  Overview  An advance directive is a legal way to state your wishes at the end of your life. It tells your family and your doctor what to do if you can't say what you want. There are two main types of advance directives. You can change them any time your wishes change. Living will. This form tells your family and your doctor your wishes about life support and other treatment. The form is also called a declaration. Medical power of . This form lets you name a person to make treatment decisions for you when you can't speak for yourself. This person is called a health care agent (health care proxy, health care surrogate). The form is also called a durable power of  for health care. If you do not have an advance directive, decisions about your medical care may be made by a family member, or by a doctor or a  who doesn't know you. It may help to think of an advance directive as a gift to the people who care for you. If you have one, they won't have to make tough decisions by themselves. Follow-up care is a key part of your treatment and safety. Be sure to make and go to all appointments, and call your doctor if you are having problems. It's also a good idea to know your test results and keep a list of the medicines you take. What should you include in an advance directive? Many states have a unique advance directive form. (It may ask you to address specific issues.) Or you might use a universal form that's approved by many states. If your form doesn't tell you what to address, it may be hard to know what to include in your advance directive. Use the questions below to help you get started. · Who do you want to make decisions about your medical care if you are not able to? · What life-support measures do you want if you have a serious illness that gets worse over time or can't be cured? · What are you most afraid of that might happen? (Maybe you're afraid of having pain, losing your independence, or being kept alive by machines.)  · Where would you prefer to die? (Your home? A hospital? A nursing home?)  · Do you want to donate your organs when you die? · Do you want certain Yazidism practices performed before you die? When should you call for help? Be sure to contact your doctor if you have any questions. Where can you learn more? Go to https://[email or web address] org and sign in to your [employer] account. Enter R264 in the Advanced Image Enhancement box to learn more about \"Advance Directives: Care Instructions. \"     If you do not have an account, please click on the \"Sign Up Now\" link. Current as of: March 17, 2021               Content Version: 13.0  © 2006-2021 Healthwise, [employer]. Care instructions adapted under license by [facility] ([facility]). If you have questions about a medical condition or this instruction, always ask your healthcare professional. [place] [address] any warranty or liability for your use of this information. Learning About Medical Power of   What is a medical power of ? A medical power of , also called a durable power of  for health care, is one type of the legal forms called advance directives. It lets you name the person you want to make treatment decisions for you if you can't speak or decide for yourself. The person you choose is called your health care agent. This person is also called a health care proxy or health care surrogate. A medical power of  may be called something else in your state. How do you choose a health care agent? Choose your health care agent carefully. This person may or may not be a family member. Talk to the person before you make your final decision. Make sure he or she is comfortable with this responsibility. It's a good idea to choose someone who:  · Is at least 25years old.   · Knows you well and understands what makes life meaningful for you. · Understands your Catholic and moral values. · Will do what you want, not what he or she wants. · Will be able to make difficult choices at a stressful time. · Will be able to refuse or stop treatment, if that is what you would want, even if you could die. · Will be firm and confident with health professionals if needed. · Will ask questions to get needed information. · Lives near you or agrees to travel to you if needed. Your family may help you make medical decisions while you can still be part of that process. But it's important to choose one person to be your health care agent in case you aren't able to make decisions for yourself. If you don't fill out the legal form and name a health care agent, the decisions your family can make may be limited. A health care agent may be called something else in your state. Who will make decisions for you if you don't have a health care agent? If you don't have a health care agent or a living will, you may not get the care you want. Decisions may be made by family members who disagree about your medical care. Or decisions may be made by a medical professional who doesn't know you well. In some cases, a  makes the decisions. When you name a health care agent, it is very clear who has the power to make health decisions for you. How do you name a health care agent? You name your health care agent on a legal form. This form is usually called a medical power of . Ask your hospital, state bar association, or office on aging where to find these forms. You must sign the form to make it legal. Some states require you to get the form notarized. This means that a person called a  watches you sign the form and then he or she signs the form. Some states also require that two or more witnesses sign the form. Be sure to tell your family members and doctors who your health care agent is.   Where can you learn more? Go to https://chpepiceweb.[employer]. org and sign in to your [email or web address] account. Enter [id number] in the [facility] box to learn more about \"Learning About [address]. \"     If you do not have an account, please click on the \"Sign Up Now\" link. Current as of: March 17, 2021               Content Version: 13.0  © 2006-2021 Healthwise, [employer]. Care instructions adapted under license by [facility] ([facility]). If you have questions about a medical condition or this instruction, always ask your healthcare professional. [address] any warranty or liability for your use of this information. Learning About Living [place]  What is a living will? A living will, also called a declaration, is a legal form. It tells your family and your doctor your wishes when you can't speak for yourself. It's used by the health professionals who will treat you as you near the end of your life or if you get seriously hurt or ill. If you put your wishes in writing, your loved ones and others will know what kind of care you want. They won't need to guess. This can ease your mind and be helpful to others. And you can change or cancel your living will at any time. A living will is not the same as an estate or property will. An estate will explains what you want to happen with your money and property after you die. How do you use it? A living will is used to describe the kinds of treatment or life support you want as you near the end of your life or if you get seriously hurt or ill. Keep these facts in mind about living lauren. · Your living will is used only if you can't speak or make decisions for yourself. Most often, one or more doctors must certify that you can't speak or decide for yourself before your living will takes effect. · If you get better and can speak for yourself again, you can accept or refuse any treatment.  It doesn't matter what you said in your living will. · Some states may limit your right to refuse treatment in certain cases. For example, you may need to clearly state in your living will that you don't want artificial hydration and nutrition, such as being fed through a tube. Is a living will a legal document? A living will is a legal document. Each state has its own laws about living lauren. And a living will may be called something else in your state. Here are some things to know about living lauren. · You don't need an  to complete a living will. But legal advice can be helpful if your state's laws are unclear. It can also help if your health history is complicated or your family can't agree on what should be in your living will. · You can change your living will at any time. Some people find that their wishes about end-of-life care change as their health changes. If you make big changes to your living will, complete a new form. · If you move to another state, make sure that your living will is legal in the state where you now live. In most cases, doctors will respect your wishes even if you have a form from a different state. · You might use a universal form that has been approved by many states. This kind of form can sometimes be filled out and stored online. Your digital copy will then be available wherever you have a connection to the internet. The doctors and nurses who need to treat you can find it right away. · Your state may offer an online registry. This is another place where you can store your living will online. · It's a good idea to get your living will notarized. This means using a person called a  to watch two people sign, or witness, your living will. What should you know when you create a living will? Here are some questions to ask yourself as you make your living will:  · Do you know enough about life support methods that might be used?  If not, talk to your doctor so you know what might be done if you can't breathe on your own, your heart stops, or you can't swallow. · What things would you still want to be able to do after you receive life-support methods? Would you want to be able to walk? To speak? To eat on your own? To live without the help of machines? · Do you want certain Yazdanism practices performed if you become very ill? · If you have a choice, where do you want to be cared for? In your home? At a hospital or nursing home? · If you have a choice at the end of your life, where would you prefer to die? At home? In a hospital or nursing home? Somewhere else? · Would you prefer to be buried or cremated? · Do you want your organs to be donated after you die? What should you do with your living will? · Make sure that your family members and your health care agent have copies of your living will (also called a declaration). · Give your doctor a copy of your living will. Ask him or her to keep it as part of your medical record. If you have more than one doctor, make sure that each one has a copy. · Put a copy of your living will where it can be easily found. For example, some people may put a copy on their refrigerator door. If you are using a digital copy, be sure your doctor, family members, and health care agent know how to find and access it. Where can you learn more? Go to [email or web address] and sign in to your [employer] account. Enter L072 in the [facility] box to learn more about \"Learning About Living [place]. \"     If you do not have an account, please click on the \"Sign Up Now\" link. Current as of: March 17, 2021               Content Version: 13.0  © 4195-8036 Healthwise, Incorporated. Care instructions adapted under license by [facility] ([facility]).  If you have questions about a medical condition or this instruction, always ask your healthcare professional. [address] any warranty or liability for your use of this information. Personalized Preventive Plan for Blake Jacob - 11/23/2021  Medicare offers a range of preventive health benefits. Some of the tests and screenings are paid in full while other may be subject to a deductible, co-insurance, and/or copay. Some of these benefits include a comprehensive review of your medical history including lifestyle, illnesses that may run in your family, and various assessments and screenings as appropriate. After reviewing your medical record and screening and assessments performed today your provider may have ordered immunizations, labs, imaging, and/or referrals for you. A list of these orders (if applicable) as well as your Preventive Care list are included within your After Visit Summary for your review. Other Preventive Recommendations:    · A preventive eye exam performed by an eye specialist is recommended every 1-2 years to screen for glaucoma; cataracts, macular degeneration, and other eye disorders. · A preventive dental visit is recommended every 6 months. · Try to get at least 150 minutes of exercise per week or 10,000 steps per day on a pedometer . · Order or download the FREE \"Exercise & Physical Activity: Your Everyday Guide\" from [employer]. Call 1-989.534.9206 or search [employer] online. · You need 3700-9060 mg of calcium and 8504-4705 IU of vitamin D per day. It is possible to meet your calcium requirement with diet alone, but a vitamin D supplement is usually necessary to meet this goal.  · When exposed to the sun, use a sunscreen that protects against both UVA and UVB radiation with an SPF of 30 or greater. Reapply every 2 to 3 hours or after sweating, drying off with a towel, or swimming. · Always wear a seat belt when traveling in a car. Always wear a helmet when riding a bicycle or motorcycle.

## 2021-11-24 LAB
A/G RATIO: 1.8 (ref 1.1–2.2)
ALBUMIN SERPL-MCNC: 4.2 G/DL (ref 3.4–5)
ALP BLD-CCNC: 122 U/L (ref 40–129)
ALT SERPL-CCNC: 11 U/L (ref 10–40)
ANION GAP SERPL CALCULATED.3IONS-SCNC: 13 MMOL/L (ref 3–16)
AST SERPL-CCNC: 18 U/L (ref 15–37)
BASOPHILS ABSOLUTE: 0 K/UL (ref 0–0.2)
BASOPHILS RELATIVE PERCENT: 0.5 %
BILIRUB SERPL-MCNC: 0.8 MG/DL (ref 0–1)
BUN BLDV-MCNC: 18 MG/DL (ref 7–20)
CALCIUM SERPL-MCNC: 9.8 MG/DL (ref 8.3–10.6)
CHLORIDE BLD-SCNC: 99 MMOL/L (ref 99–110)
CO2: 25 MMOL/L (ref 21–32)
CREAT SERPL-MCNC: 1.1 MG/DL (ref 0.6–1.2)
EOSINOPHILS ABSOLUTE: 0.1 K/UL (ref 0–0.6)
EOSINOPHILS RELATIVE PERCENT: 1 %
GFR AFRICAN AMERICAN: 59
GFR NON-AFRICAN AMERICAN: 49
GLUCOSE BLD-MCNC: 95 MG/DL (ref 70–99)
HCT VFR BLD CALC: 37.7 % (ref 36–48)
HEMOGLOBIN: 12.1 G/DL (ref 12–16)
LYMPHOCYTES ABSOLUTE: 2.5 K/UL (ref 1–5.1)
LYMPHOCYTES RELATIVE PERCENT: 36.6 %
MCH RBC QN AUTO: 28 PG (ref 26–34)
MCHC RBC AUTO-ENTMCNC: 32.1 G/DL (ref 31–36)
MCV RBC AUTO: 87.3 FL (ref 80–100)
MONOCYTES ABSOLUTE: 0.6 K/UL (ref 0–1.3)
MONOCYTES RELATIVE PERCENT: 9.2 %
NEUTROPHILS ABSOLUTE: 3.6 K/UL (ref 1.7–7.7)
NEUTROPHILS RELATIVE PERCENT: 52.7 %
PDW BLD-RTO: 15.3 % (ref 12.4–15.4)
PLATELET # BLD: 231 K/UL (ref 135–450)
PMV BLD AUTO: 9 FL (ref 5–10.5)
POTASSIUM SERPL-SCNC: 4.6 MMOL/L (ref 3.5–5.1)
RBC # BLD: 4.32 M/UL (ref 4–5.2)
SODIUM BLD-SCNC: 137 MMOL/L (ref 136–145)
TOTAL PROTEIN: 6.5 G/DL (ref 6.4–8.2)
WBC # BLD: 6.8 K/UL (ref 4–11)

## 2021-11-24 NOTE — RESULT ENCOUNTER NOTE
Normal kidney/ liver function. No anemia/ infection. Lets see if the muscle relaxer helps. If it doesn't call us next week and I will give you some Gabapentin. You should not be taking any Ibuprofen/ Naproxen/ Motrin due to the Eliquis use. Please call if you have additional questions and/ or concerns. Please keep your next appt. Thank you.

## 2021-12-09 DIAGNOSIS — N39.46 MIXED INCONTINENCE: ICD-10-CM

## 2021-12-09 RX ORDER — OXYBUTYNIN CHLORIDE 5 MG/1
TABLET ORAL
Qty: 90 TABLET | Refills: 0 | Status: SHIPPED | OUTPATIENT
Start: 2021-12-09 | End: 2022-01-25

## 2022-01-03 DIAGNOSIS — M51.36 DEGENERATIVE DISC DISEASE, LUMBAR: Primary | ICD-10-CM

## 2022-01-03 RX ORDER — GABAPENTIN 100 MG/1
100 CAPSULE ORAL DAILY
Qty: 30 CAPSULE | Refills: 0 | Status: SHIPPED | OUTPATIENT
Start: 2022-01-03 | End: 2022-01-25 | Stop reason: SDUPTHER

## 2022-01-03 NOTE — TELEPHONE ENCOUNTER
States the tizanidine makes her really dizzy and does not help with the back pain. She has not been able to get the xrays done due to transportation issues but she will try to get that done this week. She wants to know if you can change the tizanidine to something else?   It looks like you had considered gabapentin in your note

## 2022-01-03 NOTE — TELEPHONE ENCOUNTER
Stop Tizanidine. Start Gabapentin. PDMP checked today. I started at lowest dose so we can go up some if we need to. Send to [facility] ([facility])?

## 2022-01-19 ENCOUNTER — HOSPITAL ENCOUNTER (OUTPATIENT)
Dept: GENERAL RADIOLOGY | Age: 77
Discharge: HOME | End: 2022-01-19
Payer: MEDICARE

## 2022-01-19 ENCOUNTER — HOSPITAL ENCOUNTER (OUTPATIENT)
Age: 77
Discharge: HOME | End: 2022-01-19
Payer: MEDICARE

## 2022-01-19 DIAGNOSIS — M51.36 DEGENERATIVE DISC DISEASE, LUMBAR: ICD-10-CM

## 2022-01-19 PROCEDURE — 72100 X-RAY EXAM L-S SPINE 2/3 VWS: CPT

## 2022-01-21 ENCOUNTER — PATIENT MESSAGE (OUTPATIENT)
Dept: FAMILY MEDICINE CLINIC | Age: 77
End: 2022-01-21

## 2022-01-21 DIAGNOSIS — M51.36 DEGENERATIVE DISC DISEASE, LUMBAR: Primary | ICD-10-CM

## 2022-01-21 NOTE — TELEPHONE ENCOUNTER
From: Patricia Whittaker  To: Aguilar Mouse  Sent: 1/21/2022 11:19 AM EST  Subject: Your notation on xray results     You say MRI needed. Why? You ask about muscle relaxers. I stopped taking because of extreme dizziness. I called your office and you prescribed gabapentin. I take at bedtime to avoid dizziness. I sleep better. I seem to be able to function longer before pain begins. Using brace. When pain becomes unbearable I use heat pad for relief.

## 2022-01-21 NOTE — RESULT ENCOUNTER NOTE
Arthritis showing in spine which is what we were expecting. More than likely you will need an MRI of your spine given the severity of your spine but we needed this Xray to start the work up. How is the muscle relaxer working for you?

## 2022-01-25 DIAGNOSIS — N39.46 MIXED INCONTINENCE: ICD-10-CM

## 2022-01-25 RX ORDER — GABAPENTIN 100 MG/1
300 CAPSULE ORAL DAILY
Qty: 90 CAPSULE | Refills: 0 | Status: SHIPPED | OUTPATIENT
Start: 2022-01-25 | End: 2022-02-28 | Stop reason: SDUPTHER

## 2022-01-25 RX ORDER — OXYBUTYNIN CHLORIDE 5 MG/1
TABLET ORAL
Qty: 90 TABLET | Refills: 0 | Status: SHIPPED | OUTPATIENT
Start: 2022-01-25 | End: 2022-03-23 | Stop reason: SDUPTHER

## 2022-02-08 ENCOUNTER — HOSPITAL ENCOUNTER (OUTPATIENT)
Dept: PHYSICAL THERAPY | Age: 77
Setting detail: THERAPIES SERIES
Discharge: HOME | End: 2022-02-08
Payer: MEDICARE

## 2022-02-08 PROCEDURE — 97161 PT EVAL LOW COMPLEX 20 MIN: CPT

## 2022-02-08 PROCEDURE — 97110 THERAPEUTIC EXERCISES: CPT

## 2022-02-08 PROCEDURE — 97112 NEUROMUSCULAR REEDUCATION: CPT

## 2022-02-08 PROCEDURE — 97140 MANUAL THERAPY 1/> REGIONS: CPT

## 2022-02-08 NOTE — FLOWSHEET NOTE
[facility], [address] [place] [address] [address], [address]  Phone: [phone], [phone]    Physical Therapy Treatment Note/ Progress Report:     Date:  2022    Patient Name:  Kassandra Guerra    :  1945  MRN: [id number]  Restrictions/Precautions:    Medical/Treatment Diagnosis Information:  · Diagnosis: Degnerative Disc Disease of Lumbar Spine  · Treatment Diagnosis: L89.73  Insurance/Certification information:  PT Insurance Information: [employer]  Physician Information:  Referring Practitioner: IFTIKHAR Matthew CNP  Has the plan of care been signed (Y/N):        []  Yes  [x]  No     Date of Patient follow up with Physician:  In may, not sure on specific date    Is this a Progress Report:     []  Yes  [x]  No      If Yes:  Date Range for reporting period:  Initial Eval: 2022  Beginnin2022 --- Ending: 3/10/2022    Progress report will be due (10 Rx or 30 days whichever is less): 3/21/3043     Recertification will be due (POC Duration  / 90 days whichever is less): 2022     Visit # Insurance Allowable Auth Required   In Person 1 Med Nec []  Yes     []  No    Tele Health   []  Yes     []  No    Total 1       Functional Scale: Oswestry 40% (Score 20/50)   Date assessed: 2022      Latex Allergy:  [x]NO      []YES  Preferred Language for Healthcare:   [x]English       []other:    Pain level: currently 2/10; worst 8/10     SUBJECTIVE: See eval    OBJECTIVE: See eval   Observation:    Test measurements:      RESTRICTIONS/PRECAUTIONS: HTN, Diabetes, OA, Anxiety/Depression, Afib    Exercises/Interventions:   Therapeutic Ex (65019)  Therapeutic Activity (81706)  NMR re-education (23484) Sets/Reps Notes/CUES   Bike NV         TA contraction  20 x 5\"    PPT 20x     LTR 20x ea    BKFO 20x ea    SLR  2 x 10 ea    Supine Alt Marches 20x ea w/ TA contraction        Piriformis stretch NV    Figure 4 stretch NV Manual Intervention (53783)     Manual PROM lumbar spine- flx/ext 5'    STM to lumbar paraspinals  5'                             [address] access code: [email or web address]  Access Code: [email or web address]  URL: [email or web address]  Date: 02/08/2022  Prepared by: Joe Dodd    Exercises  Supine Posterior Pelvic Tilt - 2 x daily - 7 x weekly - 2 sets - 10 reps - 5 s hold  Supine March with Alternating Leg Lifts - 2 x daily - 7 x weekly - 3 sets - 10 reps  Hooklying Lumbar Rotation - 2 x daily - 7 x weekly - 3 sets - 10 reps  Supine Transversus Abdominis Bracing - Hands on Stomach - 2 x daily - 7 x weekly - 3 sets - 10 reps - 5 s hold  Bent Knee Fallouts - 2 x daily - 7 x weekly - 3 sets - 10 reps  Straight Leg Raise - 2 x daily - 7 x weekly - 3 sets - 10 reps             Patient Education 10'  Pt education with HEP and progression of PT along with compliance with HEP to aide with formal PT for optimal outcomes. Therapeutic Exercise and NMR EXR  [x] (53488) Provided verbal/tactile cueing for activities related to strengthening, flexibility, endurance, ROM for improvements in LE, proximal hip, and core control with self care, mobility, lifting, ambulation. [x] (48673) Provided verbal/tactile cueing for activities related to improving balance, coordination, kinesthetic sense, posture, motor skill, proprioception to assist with LE, proximal hip, and core control in self-care, mobility, lifting, ambulation and eccentric single leg control. NMR and Therapeutic Activities:    [x] (09446 or 31334) Provided verbal/tactile cueing for activities related to improving balance, coordination, kinesthetic sense, posture, motor skill, proprioception and motor activation to allow for proper function of core, proximal hip and LE with self-care and ADLs and functional mobility.    [x] (93559) Gait Re-education- Provided training and instruction to the patient for proper LE, core and proximal hip recruitment and positioning and eccentric body weight control with ambulation re-education including up and down stairs     Home Exercise Program:    [x] (01888) Reviewed/Progressed HEP activities related to strengthening, flexibility, endurance, ROM of core, proximal hip and LE for functional self-care, mobility, lifting and ambulation/stair navigation   [x] (20278) Reviewed/Progressed HEP activities related to improving balance, coordination, kinesthetic sense, posture, motor skill, proprioception of core, proximal hip and LE for self-care, mobility, lifting, and ambulation/stair navigation      Manual Treatments:  PROM / STM / Oscillations-Mobs:  G-I, II, III, IV (PA's, Inf., Post.)  [x] (78313) Provided manual therapy to mobilize LE, proximal hip and/or LS spine soft tissue/joints for the purpose of modulating pain, promoting relaxation, increasing ROM, reducing/eliminating soft tissue swelling/inflammation/restriction, improving soft tissue extensibility and allowing for proper ROM for normal function with self-care, mobility, lifting and ambulation. Modalities:       Charges:  Timed Code Treatment Minutes: 40'   Total Treatment Minutes:  60'   BWC:  TE TIME:  NMR TIME:  MANUAL TIME:  UNTIMED MINUTES:  Medicare Total:           [x] EVAL (LOW) 06741 (typically 20 minutes face-to-face) x 1  [] EVAL (MOD) 03648 (typically 30 minutes face-to-face)  [] EVAL (HIGH) 00640 (typically 45 minutes face-to-face)  [] RE-EVAL      [x] KN(88342) x 1     [] IONTO  [x] NMR (67055) x 1     [] VASO  [x] Manual (49484) x 1     [] Other:  [] TA x       [] Mech Traction (28534)  [] ES(attended) (17701)      [] ES (un) (11015):    ASSESSMENT:  See eval    GOALS:     Short Term Goals: To be achieved in: 2 weeks  1. Independent in HEP and progression per patient tolerance, in order to prevent re-injury. []? Progressing: []? Met: []? Not Met: []? Adjusted       2.  Patient will have a decrease in pain to facilitate improvement in movement, function, and ADLs as indicated by Functional Deficits. []? Progressing: []? Met: []? Not Met: []? Adjusted          Long Term Goals: To be achieved in: 12 weeks  1. Disability index score of 20% or less for the Oswestry to assist with reaching prior level of function. []? Progressing: []? Met: []? Not Met: []? Adjusted      2. Patient will demonstrate increased lumbar spine AROM to allow for ability to perform all functional activities without pain or restriction as indicated by patients Functional Deficits. []? Progressing: []? Met: []? Not Met: []? Adjusted       3. Patient will demonstrate an increase in strength of LE to 4+/5 to allow for ability to perform high demanding household chores without pain or restircion as indicated by patients Functional Deficits. []? Progressing: []? Met: []? Not Met: []? Adjusted       4. Patient will return to all transfers, work activities, and functional activities without increased symptoms or restriction. []? Progressing: []? Met: []? Not Met: []? Adjusted       5. Patient will have 0/10 pain with ADL's.  []? Progressing: []? Met: []? Not Met: []? Adjusted       6. Patient stated goal: Be able to garden this spring and summer without pain or restriction. []? Progressing: []? Met: []? Not Met: []? Adjusted        Overall Progression Towards Functional goals/ Treatment Progress Update:  [] Patient is progressing as expected towards functional goals listed. [] Progression is slowed due to complexities/Impairments listed. [] Progression has been slowed due to co-morbidities.   [x] Plan just implemented, too soon to assess goals progression <30days   [] Goals require adjustment due to lack of progress  [] Patient is not progressing as expected and requires additional follow up with physician  [] Other    Prognosis for POC: [x] Good [] Fair  [] Poor    Patient requires continued skilled intervention: [x] Yes  [] No    Treatment/Activity Tolerance:  [x] Patient able to complete treatment [] Patient limited by fatigue  [] Patient limited by pain    [] Patient limited by other medical complications  [] Other:     Return to Play: (if applicable)   []  Stage 1: Intro to Strength   []  Stage 2: Return to Run and Strength   []  Stage 3: Return to Jump and Strength   []  Stage 4: Dynamic Strength and Agility   []  Stage 5: Sport Specific Training     []  Ready to Return to Play, Meets All Above Stages   []  Not Ready for Return to Sports   Comments:                         PLAN: See eval  [] Continue per plan of care [] Alter current plan (see comments above)  [x] Plan of care initiated [] Hold pending MD visit [] Discharge    Electronically signed by:  Annie Hernandez, PT, MPT, ATC, CertDN    Note: If patient does not return for scheduled/ recommended follow up visits, this note will serve as a discharge from care along with most recent update on progress.

## 2022-02-08 NOTE — PLAN OF CARE
[facility], [address] [place] [address] 904 [facility] [place], [address], [place], [address] [place] [address]  Phone: [phone], [phone]                                                       Physical Therapy Certification    Dear Referring Practitioner: IFTIKHAR Pedraza CNP,    We had the pleasure of evaluating the following patient for physical therapy services at [address] [place] [address]. A summary of our findings can be found in the initial assessment below. This includes our plan of care. If you have any questions or concerns regarding these findings, please do not hesitate to contact me at the office phone number checked above.   Thank you for the referral.       Physician Signature:_______________________________Date:__________________  By signing above (or electronic signature), therapists plan is approved by physician      Patient: Mandy Espino   : 1945   MRN: [id number]  Referring Physician: Referring Practitioner: IFTIKHAR Pedraza CNP      Evaluation Date: 2022      Medical Diagnosis Information:  Diagnosis: Degnerative Disc Disease of Lumbar Spine   Treatment Diagnosis: M51.36                                         Insurance information: PT Insurance Information: [employer]     Precautions/ Contra-indications/Relevant Medical History: HTN, Diabetes, OA, Anxiety/Depression, Afib     C-SSRS Triggered by Intake questionnaire (Past 2 wk assessment):   [x] No, Questionnaire did not trigger screening.   [] Yes, Patient intake triggered further evaluation      [] C-SSRS Screening completed  [] PCP notified via Plan of Care  [] Emergency services notified     Latex Allergy:  [x]NO      []YES     Preferred Language for Healthcare:   [x]English       []other:     SUBJECTIVE: Patient stated complaint: Patient is a 78year old female who presents at therapy clinic for initial evaluation secondary to low back pain that has been ongoing for over 20 years. Patient reports going to see MD due to this pain and which x-ray imaging was performed. Imaging showed significant arthritis in the lumbar spine and was referred for skilled PT services with the diagnoses of degenerative disc disease. Functional Disability Index: Oswestry 40% (Score 20/50)    Pain Scale: currently 2/10; worst 8/10  Easing factors: heat  Provocative factors: activity, standing for long periods of time. Type: [x]Constant   []Intermittent  [x]Radiating []Localized []other:     Numbness/Tingling: None reported. Functional Limitations/Impairments: [x]Sitting [x]Standing [x]Walking    [x]Squatting/bending [x]Stairs           [x]ADL's  [x]Transfers [x]Sports/Recreation  []Other:    Occupation/School: Retired    Living Status/Prior Level of Function: Independent with ADLs, IADLs, and all functional activities prior to most recent onset of low back pain.     OBJECTIVE:     Standing Exam Normal Abnormal N/A Comments   Toe walk         Heel Walk       Side bending       Pelvic Height x      Fwd Bend- (aberrant juttering or innominate mvmt) x      Extension       Trendelenburg       Kemps/Quadrant       Stork       SLS/SLS w rotation                     Seated Exam Normal Abnormal N/A Comments   Pelvic Height x      Seated Rotation  x  ROM on L>R   Seated flexion x      B hip IR                     Supine Exam Normal Abnormal N/A Comments   Hip flexion x      Abduction       ER       IR       BARBIE/Rachid       Hip scour       SLR x      Crossed SLR x      Supine to sit       SI distraction/compression       Hip thrust                     Prone Exam Normal Abnormal N/A Comments   Prone knee bend       Prone hip IR       B Achilles reflex/Pheasant       PA/Spring       Prone Instability test       Sacral Spring/thrust                       ROM LEFT RIGHT Comments   Lumbar Flex 75°  -    Lumbar Ext 8°  -    Side Bend 11°  8°     Rotation                    ROM LEFT RIGHT Comments   Hip Flexion 92°  96°     Hip Abd      Hip ER      Hip IR      Hip Extension 0°  0°     Knee Ext [facility] WFL    Knee Flex [facility] WFL    Hamstring Flex      Piriformis                    Strength LEFT RIGHT Comments   Multifidus      Transverse Ab   Good contraction of TA noted. Endurance lacking   Hip Flexors 4/5 4/5    Hip Abductors \" \"    Hip Extensors 4-/5 4-/5                   Myotomes Normal Abnormal Comments   Hip flexion (L1-L2) x     Knee extension (L2-L4) x     Dorsiflexion (L4-L5) x     Great Toe Ext (L5) x     Ankle Eversion (S1-S2) x     Ankle PF(S1-S2) x         Dermatomes Normal Abnormal Comments   inguinal area (L1)  x     anterior mid-thigh (L2) x     distal ant thigh/med knee (L3) x     medial lower leg and foot (L4) x     lateral lower leg and foot (L5) x     posterior calf (S1) x     medial calcaneus (S2) x         Neural dynamic tension testing Normal Abnormal Comments   Slump Test  - Degree of knee flexion:       SLR       0-30      30-70      Femoral nerve (L2-4)        Reflexes Normal Abnormal Comments   S1-2 Seated achilles      S1-2 Prone knee bend      L3-4 Patellar tendon      C5-6 Biceps      C6 Brachioradialis      C7-8 Triceps      Clonus      Babinski      Watts's        Joint mobility:   []Normal    [x]Hypo   []Hyper    Palpation: Patient had slight tenderness to palpation along lumbar paraspinal musculature. Functional Mobility/Transfers: Patient requires increased time for all functional activities secondary to low back pain. Posture: Patient demonstrates forward flexed, rounded shoulder, and increased thoracic kyphotic posturing. Bandages/Dressings/Incisions: N/A    Gait (include devices/WB status): Patient ambulates with a trunk flexed posturing and demonstrates a slowed mike and narrow base of support. [x] Patient history, allergies, meds reviewed. Medical chart reviewed. See intake form.      Review Of Systems (ROS):  [x]Performed Review of systems (Integumentary, CardioPulmonary, Neurological) by intake and observation. Intake form has been scanned into medical record. Patient has been instructed to contact their primary care physician regarding ROS issues if not already being addressed at this time. Co-morbidities/Complexities (which will affect course of rehabilitation):   []None           Arthritic conditions   []Rheumatoid arthritis (M05.9)  [x]Osteoarthritis (M19.91)   Cardiovascular conditions   [x]Hypertension (I10)  []Hyperlipidemia (E78.5)  []Angina pectoris (I20)  []Atherosclerosis (I70)   Musculoskeletal conditions   []Disc pathology   []Congenital spine pathologies   []Prior surgical intervention  []Osteoporosis (M81.8)  []Osteopenia (M85.8)   Endocrine conditions   []Hypothyroid (E03.9)  []Hyperthyroid Gastrointestinal conditions   []Constipation (D09.72)   Metabolic conditions   []Morbid obesity (E66.01)  []Diabetes type 1(E10.65) or 2 (E11.65)   []Neuropathy (G60.9)     Pulmonary conditions   []Asthma (J45)  []Coughing   []COPD (J44.9)   Psychological Disorders  [x]Anxiety (F41.9)  [x]Depression (F32.9)   []Other:   [x]Other: Afib         Barriers to/and or personal factors that will affect rehab potential:              []Age  []Sex              []Motivation/Lack of Motivation                        [x]Co-Morbidities              []Cognitive Function, education/learning barriers              []Environmental, home barriers              []profession/work barriers  []past PT/medical experience  []other:  Justification:     Falls Risk Assessment (30 days):   [x] Falls Risk assessed and no intervention required.   [] Falls Risk assessed and Patient requires intervention due to being higher risk   TUG score (>12s at risk):     [] Falls education provided, including       ASSESSMENT:   Functional Impairments:     [x]Noted lumbar/proximal hip hypomobility   []Noted lumbosacral and/or generalized hypermobility   [x]Decreased Lumbosacral/hip/LE functional ROM   [x]Decreased core/proximal hip strength and neuromuscular control    [x]Decreased LE functional strength    [x]Abnormal reflexes/sensation/myotomal/dermatomal deficits  [x]Reduced balance/proprioceptive control    []other:      Functional Activity Limitations (from functional questionnaire and intake)   [x]Reduced ability to tolerate prolonged functional positions   [x]Reduced ability or difficulty with changes of positions or transfers between positions   [x]Reduced ability to maintain good posture and demonstrate good body mechanics with sitting, bending, and lifting   [x]Reduced ability to sleep   [x] Reduced ability or tolerance with driving and/or computer work   [x]Reduced ability to perform lifting, reaching, carrying tasks   [x]Reduced ability to squat   [x]Reduced ability to forward bend   [x]Reduced ability to ambulate prolonged functional periods/distances/surfaces   [x]Reduced ability to ascend/descend stairs   []other:       Participation Restrictions   [x]Reduced participation in self care activities   [x]Reduced participation in home management activities   [x]Reduced participation in work activities   [x]Reduced participation in social activities. [x]Reduced participation in sport/recreational activities. Classification:   [x]Signs/symptoms consistent with Lumbar instability/stabilization subgroup. []Signs/symptoms consistent with Lumbar mobilization/manipulation subgroup, myotomes and dermatomes intact. Meets manipulation criteria.     []Signs/symptoms consistent with Lumbar direction specific/centralization subgroup   []Signs/symptoms consistent with Lumbar traction subgroup     []Signs/symptoms consistent with lumbar facet dysfunction   []Signs/symptoms consistent with lumbar stenosis type dysfunction   []Signs/symptoms consistent with nerve root involvement including myotome & dermatome dysfunction   []Signs/symptoms consistent with post-surgical status including: decreased ROM, strength and function. []signs/symptoms consistent with pathology which may benefit from Dry needling     []other:      Prognosis/Rehab Potential:      []Excellent   [x]Good    []Fair   []Poor    Tolerance of evaluation/treatment:    []Excellent   [x]Good    []Fair   []Poor    Physical Therapy Evaluation Complexity Justification   [x] A history of present problem with:  [] no personal factors and/or comorbidities that impact the plan of care;  []1-2 personal factors and/or comorbidities that impact the plan of care  [x]3 personal factors and/or comorbidities that impact the plan of care  [x] An examination of body systems using standardized tests and measures addressing any of the following: body structures and functions (impairments), activity limitations, and/or participation restrictions;:  [] a total of 1-2 or more elements   [x] a total of 3 or more elements   [] a total of 4 or more elements   [x] A clinical presentation with:  [x] stable and/or uncomplicated characteristics   [] evolving clinical presentation with changing characteristics  [] unstable and unpredictable characteristics;   [x] Clinical decision making of [x] low, [] moderate, [] high complexity using standardized patient assessment instrument and/or measurable assessment of functional outcome.     [x] EVAL (LOW) 46835 (typically 20 minutes face-to-face)  [] EVAL (MOD) 40074 (typically 30 minutes face-to-face)  [] EVAL (HIGH) 57801 (typically 45 minutes face-to-face)  [] RE-EVAL     PLAN: Begin PT focusing on: proximal hip mobilizations, LB mobs, LB core activation, proximal hip activation, and HEP    Frequency/Duration: 1-2 days per week for 12 weeks:  Interventions:  [x]  Therapeutic exercise including: strength training, ROM, for the lower extremity and core   [x]  NMR activation and proprioception for LE, Glutes and Core   [x]  Manual therapy as indicated for LE, Hip and spine to include: Dry Needling/IASTM, STM, PROM, Gr I-IV mobilizations, manipulation. [x] Modalities as needed that may include: thermal agents, E-stim, Biofeedback, US, iontophoresis as indicated  [x] Patient education on joint protection, postural re-education, activity modification, progression of HEP. HEP instruction: Refer to [address] [place] [address] access code and exercises on the 1st visit treatment note    GOALS:    Short Term Goals: To be achieved in: 2 weeks  1. Independent in HEP and progression per patient tolerance, in order to prevent re-injury. [] Progressing: [] Met: [] Not Met: [] Adjusted   2. Patient will have a decrease in pain to facilitate improvement in movement, function, and ADLs as indicated by Functional Deficits. [] Progressing: [] Met: [] Not Met: [] Adjusted     Long Term Goals: To be achieved in: 12 weeks  1. Disability index score of 20% or less for the Oswestry to assist with reaching prior level of function. [] Progressing: [] Met: [] Not Met: [] Adjusted   2. Patient will demonstrate increased lumbar spine AROM to allow for ability to perform all functional activities without pain or restriction as indicated by patients Functional Deficits. [] Progressing: [] Met: [] Not Met: [] Adjusted   3. Patient will demonstrate an increase in strength of LE to 4+/5 to allow for ability to perform high demanding household chores without pain or restircion as indicated by patients Functional Deficits. [] Progressing: [] Met: [] Not Met: [] Adjusted   4. Patient will return to all transfers, work activities, and functional activities without increased symptoms or restriction. [] Progressing: [] Met: [] Not Met: [] Adjusted   5. Patient will have 0/10 pain with ADL's.  [] Progressing: [] Met: [] Not Met: [] Adjusted   6. Patient stated goal: Be able to garden this spring and summer without pain or restriction.    [] Progressing: [] Met: [] Not Met: [] Adjusted     Electronically signed by:  Dorcas Evans, PT, MPT, ATC, Cert DN

## 2022-02-15 ENCOUNTER — HOSPITAL ENCOUNTER (OUTPATIENT)
Dept: PHYSICAL THERAPY | Age: 77
Setting detail: THERAPIES SERIES
Discharge: HOME | End: 2022-02-15
Payer: MEDICARE

## 2022-02-15 PROCEDURE — 97112 NEUROMUSCULAR REEDUCATION: CPT | Performed by: PHYSICAL THERAPY ASSISTANT

## 2022-02-15 PROCEDURE — 97110 THERAPEUTIC EXERCISES: CPT | Performed by: PHYSICAL THERAPY ASSISTANT

## 2022-02-15 PROCEDURE — 97140 MANUAL THERAPY 1/> REGIONS: CPT | Performed by: PHYSICAL THERAPY ASSISTANT

## 2022-02-15 NOTE — FLOWSHEET NOTE
[facility], [address] [place] [address] [address], [address]  Phone: [phone], [phone]    Physical Therapy Treatment Note/ Progress Report:     Date:  2/15/2022    Patient Name:  Lilian Walton    :  1945  MRN: [id number]  Restrictions/Precautions:    Medical/Treatment Diagnosis Information:  · Diagnosis: Degnerative Disc Disease of Lumbar Spine  · Treatment Diagnosis: B84.83  Insurance/Certification information:  PT Insurance Information: [employer]  Physician Information:  Referring Practitioner: IFTIKHAR Vines CNP  Has the plan of care been signed (Y/N):        []  Yes  [x]  No     Date of Patient follow up with Physician: In may, not sure on specific date    Is this a Progress Report:     []  Yes  [x]  No      If Yes:  Date Range for reporting period:  Initial Eval: 2022  Beginnin2022 --- Ending: 3/10/2022    Progress report will be due (10 Rx or 30 days whichever is less): 6806     Recertification will be due (POC Duration  / 90 days whichever is less): 2022     Visit # Insurance Allowable Auth Required   In Person 2 Med Nec []  Yes     []  No    Tele Health   []  Yes     []  No    Total 2       Functional Scale: Oswestry 40% (Score 20/50)   Date assessed: 2022      Latex Allergy:  [x]NO      []YES  Preferred Language for Healthcare:   [x]English       []other:    Pain level: currently 2/10; worst 8/10     SUBJECTIVE: Notices that it takes a little longer for pain to set in and it does not seem as severe. She is anxious to start yard work and knows that is when her symptoms will show up. No pain at treatment time.      OBJECTIVE: See eval   Observation:    Test measurements:      RESTRICTIONS/PRECAUTIONS: HTN, Diabetes, OA, Anxiety/Depression, Afib    Exercises/Interventions:   Therapeutic Ex (21546)  Therapeutic Activity (89541)  NMR re-education (23318) Sets/Reps Notes/CUES   Bike NV         TA contraction  20 x 5\"    PPT 20x     LTR 20x ea    BKFO 20x ea    SLR  2 x 10 ea    Supine Alt Marches 20x ea w/ TA contraction   Bridges  X15 5\" hold    Clamshells x20    Single knee to chest 5\" x10 ea          Piriformis stretch 3x30\" R, L     Supine HS Stretch  3x30\" R, L                                             Manual Intervention (34820)     Manual PROM lumbar spine- flx/ext 5'    STM to lumbar paraspinals  5'    Manual lumbar traction  5'                        [address] access code: [email or web address]  Access Code: [email or web address]  URL: [email or web address]  Date: 02/08/2022  Prepared by: Christel Robbins    Exercises  Supine Posterior Pelvic Tilt - 2 x daily - 7 x weekly - 2 sets - 10 reps - 5 s hold  Supine March with Alternating Leg Lifts - 2 x daily - 7 x weekly - 3 sets - 10 reps  Hooklying Lumbar Rotation - 2 x daily - 7 x weekly - 3 sets - 10 reps  Supine Transversus Abdominis Bracing - Hands on Stomach - 2 x daily - 7 x weekly - 3 sets - 10 reps - 5 s hold  Bent Knee Fallouts - 2 x daily - 7 x weekly - 3 sets - 10 reps  Straight Leg Raise - 2 x daily - 7 x weekly - 3 sets - 10 reps             Patient Education 10'  Pt education with HEP and progression of PT along with compliance with HEP to aide with formal PT for optimal outcomes. Therapeutic Exercise and NMR EXR  [x] (23199) Provided verbal/tactile cueing for activities related to strengthening, flexibility, endurance, ROM for improvements in LE, proximal hip, and core control with self care, mobility, lifting, ambulation. [x] (38003) Provided verbal/tactile cueing for activities related to improving balance, coordination, kinesthetic sense, posture, motor skill, proprioception to assist with LE, proximal hip, and core control in self-care, mobility, lifting, ambulation and eccentric single leg control.      NMR and Therapeutic Activities:    [x] (75861 or 48153) Provided verbal/tactile cueing for activities related to improving balance, coordination, kinesthetic sense, posture, motor skill, proprioception and motor activation to allow for proper function of core, proximal hip and LE with self-care and ADLs and functional mobility. [x] (72503) Gait Re-education- Provided training and instruction to the patient for proper LE, core and proximal hip recruitment and positioning and eccentric body weight control with ambulation re-education including up and down stairs     Home Exercise Program:    [x] (11941) Reviewed/Progressed HEP activities related to strengthening, flexibility, endurance, ROM of core, proximal hip and LE for functional self-care, mobility, lifting and ambulation/stair navigation   [x] (80995) Reviewed/Progressed HEP activities related to improving balance, coordination, kinesthetic sense, posture, motor skill, proprioception of core, proximal hip and LE for self-care, mobility, lifting, and ambulation/stair navigation      Manual Treatments:  PROM / STM / Oscillations-Mobs:  G-I, II, III, IV (PA's, Inf., Post.)  [x] (05817) Provided manual therapy to mobilize LE, proximal hip and/or LS spine soft tissue/joints for the purpose of modulating pain, promoting relaxation, increasing ROM, reducing/eliminating soft tissue swelling/inflammation/restriction, improving soft tissue extensibility and allowing for proper ROM for normal function with self-care, mobility, lifting and ambulation.      Modalities:   [facility] 10'    Charges:  Timed Code Treatment Minutes: 39'   Total Treatment Minutes:  54'   [facility]:  [facility] TIME:  MANUAL TIME:  UNTIMED MINUTES:  Medicare Total:           [] EVAL (LOW) 83943 (typically 20 minutes face-to-face)   [] EVAL (MOD) 78335 (typically 30 minutes face-to-face)  [] EVAL (HIGH) 03823 (typically 45 minutes face-to-face)  [] RE-EVAL      [x] GV(52819) x 1     [] IONTO  [x] NMR (89290) x 1     [] VASO  [x] Manual (14669) x 1     [] Other:  [] TA x       [] Mech Traction (82732)  [] ES(attended) (26661)      [] ES (un) (99917):    ASSESSMENT:  See eval    GOALS:     Short Term Goals: To be achieved in: 2 weeks  1. Independent in HEP and progression per patient tolerance, in order to prevent re-injury. []? Progressing: [x]? Met: []? Not Met: []? Adjusted       2. Patient will have a decrease in pain to facilitate improvement in movement, function, and ADLs as indicated by Functional Deficits. []? Progressing: [x]? Met: []? Not Met: []? Adjusted          Long Term Goals: To be achieved in: 12 weeks  1. Disability index score of 20% or less for the Oswestry to assist with reaching prior level of function. []? Progressing: []? Met: []? Not Met: []? Adjusted      2. Patient will demonstrate increased lumbar spine AROM to allow for ability to perform all functional activities without pain or restriction as indicated by patients Functional Deficits. []? Progressing: []? Met: []? Not Met: []? Adjusted       3. Patient will demonstrate an increase in strength of LE to 4+/5 to allow for ability to perform high demanding household chores without pain or restircion as indicated by patients Functional Deficits. []? Progressing: []? Met: []? Not Met: []? Adjusted       4. Patient will return to all transfers, work activities, and functional activities without increased symptoms or restriction. []? Progressing: []? Met: []? Not Met: []? Adjusted       5. Patient will have 0/10 pain with ADL's.  []? Progressing: []? Met: []? Not Met: []? Adjusted       6. Patient stated goal: Be able to garden this spring and summer without pain or restriction. []? Progressing: []? Met: []? Not Met: []? Adjusted        Overall Progression Towards Functional goals/ Treatment Progress Update:  [x] Patient is progressing as expected towards functional goals listed. [] Progression is slowed due to complexities/Impairments listed. [] Progression has been slowed due to co-morbidities.   [] Plan just implemented, too soon to assess goals progression <30days   [] Goals require adjustment due to lack of progress  [] Patient is not progressing as expected and requires additional follow up with physician  [] Other    Prognosis for POC: [x] Good [] Fair  [] Poor    Patient requires continued skilled intervention: [x] Yes  [] No    Treatment/Activity Tolerance:  [x] Patient able to complete treatment  [] Patient limited by fatigue  [] Patient limited by pain    [] Patient limited by other medical complications  [] Other:     Return to Play: (if applicable)   []  Stage 1: Intro to Strength   []  Stage 2: Return to Run and Strength   []  Stage 3: Return to Jump and Strength   []  Stage 4: Dynamic Strength and Agility   []  Stage 5: Sport Specific Training     []  Ready to Return to Play, Meets All Above Stages   []  Not Ready for Return to Sports   Comments:                         PLAN: See eval  [x] Continue per plan of care [] Alter current plan (see comments above)  [] Plan of care initiated [] Hold pending MD visit [] Discharge    Electronically signed by:  Kavya Olsen PTA    Note: If patient does not return for scheduled/ recommended follow up visits, this note will serve as a discharge from care along with most recent update on progress.

## 2022-02-17 ENCOUNTER — HOSPITAL ENCOUNTER (OUTPATIENT)
Dept: PHYSICAL THERAPY | Age: 77
Setting detail: THERAPIES SERIES
Discharge: HOME | End: 2022-02-17
Payer: MEDICARE

## 2022-02-17 NOTE — FLOWSHEET NOTE
[facility]    Physical Therapy  Cancellation/No-show Note  Patient Name:  August Reddy  :  1945   Date:  2022    Cancelled visits to date: 1  No-shows to date: 0    For today's appointment patient:  [x]  Cancelled  []  Rescheduled appointment  []  No-show     Reason given by patient:  [x]  Patient ill  []  Conflicting appointment  []  No transportation    []  Conflict with work  []  No reason given  []  Other:     Comments:      Phone call information:   []  Phone call made today to patient. []  Patient answered, conversation as follows:    []  Patient did not answer. [x]  Phone call not needed - pt contacted us to cancel and provided reason for cancellation.      Electronically signed by:  Jeff Oleary PTA

## 2022-02-21 ENCOUNTER — HOSPITAL ENCOUNTER (OUTPATIENT)
Dept: PHYSICAL THERAPY | Age: 77
Setting detail: THERAPIES SERIES
Discharge: HOME | End: 2022-02-21
Payer: MEDICARE

## 2022-02-21 PROCEDURE — 97110 THERAPEUTIC EXERCISES: CPT | Performed by: PHYSICAL THERAPY ASSISTANT

## 2022-02-21 PROCEDURE — 97112 NEUROMUSCULAR REEDUCATION: CPT | Performed by: PHYSICAL THERAPY ASSISTANT

## 2022-02-21 PROCEDURE — 97140 MANUAL THERAPY 1/> REGIONS: CPT | Performed by: PHYSICAL THERAPY ASSISTANT

## 2022-02-21 NOTE — FLOWSHEET NOTE
[facility], [address] [place] [address] [address]z [address], [address]  Phone: [phone], [phone]    Physical Therapy Treatment Note/ Progress Report:     Date:  2022    Patient Name:  Marlon Villafana    :  1945  MRN: [id number]  Restrictions/Precautions:    Medical/Treatment Diagnosis Information:  · Diagnosis: Degnerative Disc Disease of Lumbar Spine  · Treatment Diagnosis: Q05.56  Insurance/Certification information:  PT Insurance Information: [employer]  Physician Information:  Referring Practitioner: IFTIKHAR Alva CNP  Has the plan of care been signed (Y/N):        []  Yes  [x]  No     Date of Patient follow up with Physician: In may, not sure on specific date    Is this a Progress Report:     []  Yes  [x]  No      If Yes:  Date Range for reporting period:  Initial Eval: 2022  Beginnin2022 --- Ending: 3/10/2022    Progress report will be due (10 Rx or 30 days whichever is less): 1631     Recertification will be due (POC Duration  / 90 days whichever is less): 2022     Visit # Insurance Allowable Auth Required   In Person 3 Med Nec []  Yes     []  No    Tele Health   []  Yes     []  No    Total 3       Functional Scale: Oswestry 40% (Score 20/50)   Date assessed: 2022      Latex Allergy:  [x]NO      []YES  Preferred Language for Healthcare:   [x]English       []other:    Pain level: currently 2/10; worst 8/10     SUBJECTIVE: States that her left hip is sore today with a little discomfort in the back. She did take multiple breaks within a 4 hour span of work. Her activity times are increasing before her pain sets in.       OBJECTIVE: See eval   Observation:    Test measurements:      RESTRICTIONS/PRECAUTIONS: HTN, Diabetes, OA, Anxiety/Depression, Afib    Exercises/Interventions:   Therapeutic Ex (61916)  Therapeutic Activity (24301)  NMR re-education (22533) Sets/Reps Notes/CUES   Bike 5' L5          HR  X30    Standing Marches x25 Standing HS Curl  2x10    Standing Hip ABD  2x10                        LAQ / SAQ  2# 2x10 R, L / 2# x20 5\" hold     TA contraction  20 x 5\"    PPT 20x     LTR 20x ea    BKFO 20x ea    SLR  2 x 10 ea    Supine Alt Marches 2# 20x ea w/ TA contraction   Bridges  X15 5\" hold    Clamshells x20    Single knee to chest 5\" x10 ea          Piriformis stretch 3x30\" R, L     Supine HS Stretch  3x30\" R, L                                             Manual Intervention (28226)     Manual PROM lumbar spine- flx/ext 5'    STM to lumbar paraspinals  5'    Manual lumbar traction  5'                        [address] access code: [email or web address]  Access Code: [email or web address]  URL: [email or web address]  Date: 02/08/2022  Prepared by: Reina Rene    Exercises  Supine Posterior Pelvic Tilt - 2 x daily - 7 x weekly - 2 sets - 10 reps - 5 s hold  Supine March with Alternating Leg Lifts - 2 x daily - 7 x weekly - 3 sets - 10 reps  Hooklying Lumbar Rotation - 2 x daily - 7 x weekly - 3 sets - 10 reps  Supine Transversus Abdominis Bracing - Hands on Stomach - 2 x daily - 7 x weekly - 3 sets - 10 reps - 5 s hold  Bent Knee Fallouts - 2 x daily - 7 x weekly - 3 sets - 10 reps  Straight Leg Raise - 2 x daily - 7 x weekly - 3 sets - 10 reps             Patient Education 10'  Pt education with HEP and progression of PT along with compliance with HEP to aide with formal PT for optimal outcomes. Therapeutic Exercise and NMR EXR  [x] (18981) Provided verbal/tactile cueing for activities related to strengthening, flexibility, endurance, ROM for improvements in LE, proximal hip, and core control with self care, mobility, lifting, ambulation. [x] (45204) Provided verbal/tactile cueing for activities related to improving balance, coordination, kinesthetic sense, posture, motor skill, proprioception to assist with LE, proximal hip, and core control in self-care, mobility, lifting, ambulation and eccentric single leg control.      NMR and Therapeutic Activities:    [x] (31761 or 05422) Provided verbal/tactile cueing for activities related to improving balance, coordination, kinesthetic sense, posture, motor skill, proprioception and motor activation to allow for proper function of core, proximal hip and LE with self-care and ADLs and functional mobility. [x] (47913) Gait Re-education- Provided training and instruction to the patient for proper LE, core and proximal hip recruitment and positioning and eccentric body weight control with ambulation re-education including up and down stairs     Home Exercise Program:    [x] (35188) Reviewed/Progressed HEP activities related to strengthening, flexibility, endurance, ROM of core, proximal hip and LE for functional self-care, mobility, lifting and ambulation/stair navigation   [x] (11285) Reviewed/Progressed HEP activities related to improving balance, coordination, kinesthetic sense, posture, motor skill, proprioception of core, proximal hip and LE for self-care, mobility, lifting, and ambulation/stair navigation      Manual Treatments:  PROM / STM / Oscillations-Mobs:  G-I, II, III, IV (PA's, Inf., Post.)  [x] (66059) Provided manual therapy to mobilize LE, proximal hip and/or LS spine soft tissue/joints for the purpose of modulating pain, promoting relaxation, increasing ROM, reducing/eliminating soft tissue swelling/inflammation/restriction, improving soft tissue extensibility and allowing for proper ROM for normal function with self-care, mobility, lifting and ambulation.      Modalities:       Charges:  Timed Code Treatment Minutes: 54'   Total Treatment Minutes:  54'   BWC:  TE TIME:  NMR TIME:  MANUAL TIME:  UNTIMED MINUTES:  Medicare Total:           [] EVAL (LOW) 59400 (typically 20 minutes face-to-face)   [] EVAL (MOD) 12876 (typically 30 minutes face-to-face)  [] EVAL (HIGH) 43377 (typically 45 minutes face-to-face)  [] RE-EVAL      [x] IU(20047) x 2     [] IONTO  [x] NMR (88457) x 1     [] VASO  [x] Manual (83208) x 1     [] Other:  [] TA x       [] Mech Traction (09167)  [] ES(attended) (08868)      [] ES (un) (06698):    ASSESSMENT:  See eval    GOALS:     Short Term Goals: To be achieved in: 2 weeks  1. Independent in HEP and progression per patient tolerance, in order to prevent re-injury. []? Progressing: [x]? Met: []? Not Met: []? Adjusted       2. Patient will have a decrease in pain to facilitate improvement in movement, function, and ADLs as indicated by Functional Deficits. []? Progressing: [x]? Met: []? Not Met: []? Adjusted          Long Term Goals: To be achieved in: 12 weeks  1. Disability index score of 20% or less for the Oswestry to assist with reaching prior level of function. []? Progressing: []? Met: []? Not Met: []? Adjusted      2. Patient will demonstrate increased lumbar spine AROM to allow for ability to perform all functional activities without pain or restriction as indicated by patients Functional Deficits. []? Progressing: []? Met: []? Not Met: []? Adjusted       3. Patient will demonstrate an increase in strength of LE to 4+/5 to allow for ability to perform high demanding household chores without pain or restircion as indicated by patients Functional Deficits. []? Progressing: []? Met: []? Not Met: []? Adjusted       4. Patient will return to all transfers, work activities, and functional activities without increased symptoms or restriction. []? Progressing: []? Met: []? Not Met: []? Adjusted       5. Patient will have 0/10 pain with ADL's.  []? Progressing: []? Met: []? Not Met: []? Adjusted       6. Patient stated goal: Be able to garden this spring and summer without pain or restriction. []? Progressing: []? Met: []? Not Met: []? Adjusted        Overall Progression Towards Functional goals/ Treatment Progress Update:  [x] Patient is progressing as expected towards functional goals listed.     [] Progression is slowed due to complexities/Impairments listed. [] Progression has been slowed due to co-morbidities. [] Plan just implemented, too soon to assess goals progression <30days   [] Goals require adjustment due to lack of progress  [] Patient is not progressing as expected and requires additional follow up with physician  [] Other    Prognosis for POC: [x] Good [] Fair  [] Poor    Patient requires continued skilled intervention: [x] Yes  [] No    Treatment/Activity Tolerance:  [x] Patient able to complete treatment  [] Patient limited by fatigue  [] Patient limited by pain    [] Patient limited by other medical complications  [] Other:     Return to Play: (if applicable)   []  Stage 1: Intro to Strength   []  Stage 2: Return to Run and Strength   []  Stage 3: Return to Jump and Strength   []  Stage 4: Dynamic Strength and Agility   []  Stage 5: Sport Specific Training     []  Ready to Return to Play, Meets All Above Stages   []  Not Ready for Return to Sports   Comments:                         PLAN: See eval  [x] Continue per plan of care [] Alter current plan (see comments above)  [] Plan of care initiated [] Hold pending MD visit [] Discharge    Electronically signed by:  Kelli Christian PTA    Note: If patient does not return for scheduled/ recommended follow up visits, this note will serve as a discharge from care along with most recent update on progress.

## 2022-02-24 ENCOUNTER — HOSPITAL ENCOUNTER (OUTPATIENT)
Dept: PHYSICAL THERAPY | Age: 77
Setting detail: THERAPIES SERIES
Discharge: HOME | End: 2022-02-24
Payer: MEDICARE

## 2022-02-24 PROCEDURE — 97140 MANUAL THERAPY 1/> REGIONS: CPT | Performed by: PHYSICAL THERAPY ASSISTANT

## 2022-02-24 PROCEDURE — 97110 THERAPEUTIC EXERCISES: CPT | Performed by: PHYSICAL THERAPY ASSISTANT

## 2022-02-24 PROCEDURE — 97112 NEUROMUSCULAR REEDUCATION: CPT | Performed by: PHYSICAL THERAPY ASSISTANT

## 2022-02-24 NOTE — FLOWSHEET NOTE
[facility], [address] [place] [address] [address], [address]  Phone: [phone], [phone]    Physical Therapy Treatment Note/ Progress Report:     Date:  2022    Patient Name:  Diana Soto    :  1945  MRN: [id number]  Restrictions/Precautions:    Medical/Treatment Diagnosis Information:  · Diagnosis: Degnerative Disc Disease of Lumbar Spine  · Treatment Diagnosis: P40.72  Insurance/Certification information:  PT Insurance Information: [employer]  Physician Information:  Referring Practitioner: IFTIKHAR Zhou CNP  Has the plan of care been signed (Y/N):        []  Yes  [x]  No     Date of Patient follow up with Physician: In may, not sure on specific date    Is this a Progress Report:     []  Yes  [x]  No      If Yes:  Date Range for reporting period:  Initial Eval: 2022  Beginnin2022 --- Ending: 3/10/2022    Progress report will be due (10 Rx or 30 days whichever is less): 3/01/8357     Recertification will be due (POC Duration  / 90 days whichever is less): 2022     Visit # Insurance Allowable Auth Required   In Person 4 Med Nec []  Yes     []  No    Tele Health   []  Yes     []  No    Total 4       Functional Scale: Oswestry 40% (Score 20/50)   Date assessed: 2022      Latex Allergy:  [x]NO      []YES  Preferred Language for Healthcare:   [x]English       []other:    Pain level: currently 2/10; worst 8/10     SUBJECTIVE: States she had generalized muscle soreness after last visit in her core and quads but no real increased back pain.      OBJECTIVE: See eval   Observation:    Test measurements:      RESTRICTIONS/PRECAUTIONS: HTN, Diabetes, OA, Anxiety/Depression, Afib    Exercises/Interventions:   Therapeutic Ex (86910)  Therapeutic Activity (26727)  NMR re-education (96004) Sets/Reps Notes/CUES   Bike 5' L5          HR / slant board  X30  3x30\"    Standing Marches x30    Standing HS Curl  2x15    Standing Hip ABD  2x15 Mini Squats  x20    FSU  6\" x10 R,  X10 L          LAQ / SAQ  2# 2x10 R, L / 2# x20 5\" hold     TA contraction  20 x 5\"    PPT 20x     LTR 20x ea    BKFO 20x ea    SLR  2 x 10 ea    Supine Alt Marches 2# 20x ea w/ TA contraction   Bridges  X20 5\" hold    Clamshells x20    Single knee to chest 5\" x10 ea          Piriformis stretch 3x30\" R, L     Supine HS Stretch  3x30\" R, L                                             Manual Intervention (69891)     Manual PROM lumbar spine- flx/ext 5'    STM to lumbar paraspinals  5'    Manual lumbar traction  5'                        [address] access code: [email or web address]  Access Code: [email or web address]  URL: [email or web address]  Date: 02/08/2022  Prepared by: Syliva Saliva    Exercises  Supine Posterior Pelvic Tilt - 2 x daily - 7 x weekly - 2 sets - 10 reps - 5 s hold  Supine March with Alternating Leg Lifts - 2 x daily - 7 x weekly - 3 sets - 10 reps  Hooklying Lumbar Rotation - 2 x daily - 7 x weekly - 3 sets - 10 reps  Supine Transversus Abdominis Bracing - Hands on Stomach - 2 x daily - 7 x weekly - 3 sets - 10 reps - 5 s hold  Bent Knee Fallouts - 2 x daily - 7 x weekly - 3 sets - 10 reps  Straight Leg Raise - 2 x daily - 7 x weekly - 3 sets - 10 reps             Patient Education 10'  Pt education with HEP and progression of PT along with compliance with HEP to aide with formal PT for optimal outcomes. Therapeutic Exercise and NMR EXR  [x] (71948) Provided verbal/tactile cueing for activities related to strengthening, flexibility, endurance, ROM for improvements in LE, proximal hip, and core control with self care, mobility, lifting, ambulation. [x] (50202) Provided verbal/tactile cueing for activities related to improving balance, coordination, kinesthetic sense, posture, motor skill, proprioception to assist with LE, proximal hip, and core control in self-care, mobility, lifting, ambulation and eccentric single leg control.      NMR and Therapeutic Activities:    [x] (65902 or ) Provided verbal/tactile cueing for activities related to improving balance, coordination, kinesthetic sense, posture, motor skill, proprioception and motor activation to allow for proper function of core, proximal hip and LE with self-care and ADLs and functional mobility. [x] (03338) Gait Re-education- Provided training and instruction to the patient for proper LE, core and proximal hip recruitment and positioning and eccentric body weight control with ambulation re-education including up and down stairs     Home Exercise Program:    [x] (12692) Reviewed/Progressed HEP activities related to strengthening, flexibility, endurance, ROM of core, proximal hip and LE for functional self-care, mobility, lifting and ambulation/stair navigation   [x] (09607) Reviewed/Progressed HEP activities related to improving balance, coordination, kinesthetic sense, posture, motor skill, proprioception of core, proximal hip and LE for self-care, mobility, lifting, and ambulation/stair navigation      Manual Treatments:  PROM / STM / Oscillations-Mobs:  G-I, II, III, IV (PA's, Inf., Post.)  [x] (58194) Provided manual therapy to mobilize LE, proximal hip and/or LS spine soft tissue/joints for the purpose of modulating pain, promoting relaxation, increasing ROM, reducing/eliminating soft tissue swelling/inflammation/restriction, improving soft tissue extensibility and allowing for proper ROM for normal function with self-care, mobility, lifting and ambulation.      Modalities:       Charges:  Timed Code Treatment Minutes: 54'   Total Treatment Minutes:  54'   [address] [place] [address]:  [facility] TIME:  MANUAL TIME:  UNTIMED MINUTES:  Medicare Total:           [] EVAL (LOW) 21361 (typically 20 minutes face-to-face)   [] EVAL (MOD) 81418 (typically 30 minutes face-to-face)  [] EVAL (HIGH) 58942 (typically 45 minutes face-to-face)  [] RE-EVAL      [x] HT(98378) x 2     [] IONTO  [x] NMR (74842) x 1     [] VASO  [x] Manual (57380) x 1 [] Other:  [] TA x       [] [facility] Traction (63002)  [] ES(attended) (12855)      [] ES (un) (58702):    ASSESSMENT:  See eval    GOALS:     Short Term Goals: To be achieved in: 2 weeks  1. Independent in HEP and progression per patient tolerance, in order to prevent re-injury. []? Progressing: [x]? Met: []? Not Met: []? Adjusted       2. Patient will have a decrease in pain to facilitate improvement in movement, function, and ADLs as indicated by Functional Deficits. []? Progressing: [x]? Met: []? Not Met: []? Adjusted          Long Term Goals: To be achieved in: 12 weeks  1. Disability index score of 20% or less for the Oswestry to assist with reaching prior level of function. []? Progressing: []? Met: []? Not Met: []? Adjusted      2. Patient will demonstrate increased lumbar spine AROM to allow for ability to perform all functional activities without pain or restriction as indicated by patients Functional Deficits. []? Progressing: []? Met: []? Not Met: []? Adjusted       3. Patient will demonstrate an increase in strength of LE to 4+/5 to allow for ability to perform high demanding household chores without pain or restircion as indicated by patients Functional Deficits. []? Progressing: []? Met: []? Not Met: []? Adjusted       4. Patient will return to all transfers, work activities, and functional activities without increased symptoms or restriction. []? Progressing: []? Met: []? Not Met: []? Adjusted       5. Patient will have 0/10 pain with ADL's.  []? Progressing: []? Met: []? Not Met: []? Adjusted       6. Patient stated goal: Be able to garden this spring and summer without pain or restriction. []? Progressing: []? Met: []? Not Met: []? Adjusted        Overall Progression Towards Functional goals/ Treatment Progress Update:  [x] Patient is progressing as expected towards functional goals listed. [] Progression is slowed due to complexities/Impairments listed.   [] Progression has been slowed due to co-morbidities. [] Plan just implemented, too soon to assess goals progression <30days   [] Goals require adjustment due to lack of progress  [] Patient is not progressing as expected and requires additional follow up with physician  [] Other    Prognosis for POC: [x] Good [] Fair  [] Poor    Patient requires continued skilled intervention: [x] Yes  [] No    Treatment/Activity Tolerance:  [x] Patient able to complete treatment  [] Patient limited by fatigue  [] Patient limited by pain    [] Patient limited by other medical complications  [] Other:     Return to Play: (if applicable)   []  Stage 1: Intro to Strength   []  Stage 2: Return to Run and Strength   []  Stage 3: Return to Jump and Strength   []  Stage 4: Dynamic Strength and Agility   []  Stage 5: Sport Specific Training     []  Ready to Return to Play, Meets All Above Stages   []  Not Ready for Return to Sports   Comments:                         PLAN: See eval  [x] Continue per plan of care [] Alter current plan (see comments above)  [] Plan of care initiated [] Hold pending MD visit [] Discharge    Electronically signed by:  Haven Clifton PTA    Note: If patient does not return for scheduled/ recommended follow up visits, this note will serve as a discharge from care along with most recent update on progress.

## 2022-02-28 ENCOUNTER — HOSPITAL ENCOUNTER (OUTPATIENT)
Dept: PHYSICAL THERAPY | Age: 77
Setting detail: THERAPIES SERIES
Discharge: HOME | End: 2022-02-28
Payer: MEDICARE

## 2022-02-28 DIAGNOSIS — M51.36 DEGENERATIVE DISC DISEASE, LUMBAR: ICD-10-CM

## 2022-02-28 PROCEDURE — 97140 MANUAL THERAPY 1/> REGIONS: CPT | Performed by: PHYSICAL THERAPY ASSISTANT

## 2022-02-28 PROCEDURE — 97112 NEUROMUSCULAR REEDUCATION: CPT | Performed by: PHYSICAL THERAPY ASSISTANT

## 2022-02-28 PROCEDURE — 97110 THERAPEUTIC EXERCISES: CPT | Performed by: PHYSICAL THERAPY ASSISTANT

## 2022-02-28 RX ORDER — GABAPENTIN 100 MG/1
300 CAPSULE ORAL DAILY
Qty: 90 CAPSULE | Refills: 2 | Status: SHIPPED | OUTPATIENT
Start: 2022-02-28 | End: 2022-05-17 | Stop reason: SDUPTHER

## 2022-02-28 NOTE — FLOWSHEET NOTE
[facility], [address] [place] [address] Juanito Luz 67, [address]  Phone: [phone], [phone]    Physical Therapy Treatment Note/ Progress Report:     Date:  2022    Patient Name:  Ritika Sin    :  1945  MRN: [id number]  Restrictions/Precautions:    Medical/Treatment Diagnosis Information:  · Diagnosis: Degnerative Disc Disease of Lumbar Spine  · Treatment Diagnosis: U07.30  Insurance/Certification information:  PT Insurance Information: [employer]  Physician Information:  Referring Practitioner: IFTIKHAR Chávez CNP  Has the plan of care been signed (Y/N):        []  Yes  [x]  No     Date of Patient follow up with Physician: In may, not sure on specific date    Is this a Progress Report:     []  Yes  [x]  No      If Yes:  Date Range for reporting period:  Initial Eval: 2022  Beginnin2022 --- Ending: 3/10/2022    Progress report will be due (10 Rx or 30 days whichever is less):      Recertification will be due (POC Duration  / 90 days whichever is less): 2022     Visit # Insurance Allowable Auth Required   In Person 5 Med Nec []  Yes     []  No    Tele Health   []  Yes     []  No    Total 5       Functional Scale: Oswestry 40% (Score 20/50)   Date assessed: 2022      Latex Allergy:  [x]NO      []YES  Preferred Language for Healthcare:   [x]English       []other:    Pain level: currently 2/10; worst 8/10     SUBJECTIVE: Has been having a rough time at home. Sore in the left low back. States she hasn't been doing her HEP. Also reports that she has been doing a lot of work outside and not taking rest breaks like she knows she should. Had a long discussion today about the importance of strengthening, body mechanics and listening to her bodies responses.      OBJECTIVE: See eval   Observation:    Test measurements:      RESTRICTIONS/PRECAUTIONS: HTN, Diabetes, OA, Anxiety/Depression, Afib    Exercises/Interventions: Therapeutic Ex (01535)  Therapeutic Activity (69905)  NMR re-education (70105) Sets/Reps Notes/CUES   Bike 5' L5          HR / slant board  X30  3x30\"    Standing Marches x30    Standing HS Curl  2x15    Standing Hip ABD  2x15         Mini Squats  x20    FSU  6\" x20 R,  X20 L          LAQ / SAQ  2# 2x10 R, L / 2# x20 5\" hold     TA contraction  20 x 5\"    PPT 20x     LTR 20x ea    BKFO 20x ea    SLR  2 x 10 ea    Supine Alt Marches 2# 20x ea w/ TA contraction   Bridges  X20 5\" hold    Clamshells x20    Single knee to chest 5\" x10 ea          Piriformis stretch 3x30\" R, L     Supine HS Stretch  3x30\" R, L                                             Manual Intervention (27869)     Manual PROM lumbar spine- flx/ext 5'    STM to lumbar paraspinals  5'    Manual lumbar traction  5'                        [address] [place] [address] access code: [email or web address]  Access Code: [email or web address]  URL: [email or web address]  Date: 02/08/2022  Prepared by: Chrissy Tian    Exercises  Supine Posterior Pelvic Tilt - 2 x daily - 7 x weekly - 2 sets - 10 reps - 5 s hold  Supine March with Alternating Leg Lifts - 2 x daily - 7 x weekly - 3 sets - 10 reps  Hooklying Lumbar Rotation - 2 x daily - 7 x weekly - 3 sets - 10 reps  Supine Transversus Abdominis Bracing - Hands on Stomach - 2 x daily - 7 x weekly - 3 sets - 10 reps - 5 s hold  Bent Knee Fallouts - 2 x daily - 7 x weekly - 3 sets - 10 reps  Straight Leg Raise - 2 x daily - 7 x weekly - 3 sets - 10 reps             Patient Education 10'  Pt education with HEP and progression of PT along with compliance with HEP to aide with formal PT for optimal outcomes. Therapeutic Exercise and NMR EXR  [x] (50026) Provided verbal/tactile cueing for activities related to strengthening, flexibility, endurance, ROM for improvements in LE, proximal hip, and core control with self care, mobility, lifting, ambulation.   [x] (17704) Provided verbal/tactile cueing for activities related to improving balance, coordination, kinesthetic sense, posture, motor skill, proprioception to assist with LE, proximal hip, and core control in self-care, mobility, lifting, ambulation and eccentric single leg control. NMR and Therapeutic Activities:    [x] (29666 or 91063) Provided verbal/tactile cueing for activities related to improving balance, coordination, kinesthetic sense, posture, motor skill, proprioception and motor activation to allow for proper function of core, proximal hip and LE with self-care and ADLs and functional mobility. [x] (80106) Gait Re-education- Provided training and instruction to the patient for proper LE, core and proximal hip recruitment and positioning and eccentric body weight control with ambulation re-education including up and down stairs     Home Exercise Program:    [x] (15167) Reviewed/Progressed HEP activities related to strengthening, flexibility, endurance, ROM of core, proximal hip and LE for functional self-care, mobility, lifting and ambulation/stair navigation   [x] (96463) Reviewed/Progressed HEP activities related to improving balance, coordination, kinesthetic sense, posture, motor skill, proprioception of core, proximal hip and LE for self-care, mobility, lifting, and ambulation/stair navigation      Manual Treatments:  PROM / STM / Oscillations-Mobs:  G-I, II, III, IV (PA's, Inf., Post.)  [x] (69752) Provided manual therapy to mobilize LE, proximal hip and/or LS spine soft tissue/joints for the purpose of modulating pain, promoting relaxation, increasing ROM, reducing/eliminating soft tissue swelling/inflammation/restriction, improving soft tissue extensibility and allowing for proper ROM for normal function with self-care, mobility, lifting and ambulation.      Modalities:       Charges:  Timed Code Treatment Minutes: 54'   Total Treatment Minutes:  54'   BWC:  TE TIME:  NMR TIME:  MANUAL TIME:  UNTIMED MINUTES:  Medicare Total:           [] EVAL (LOW) 38928 (typically 20 minutes face-to-face)   [] EVAL (MOD) 55998 (typically 30 minutes face-to-face)  [] EVAL (HIGH) 08899 (typically 45 minutes face-to-face)  [] RE-EVAL      [x] EA(37759) x 2     [] IONTO  [x] NMR (35965) x 1     [] VASO  [x] Manual (72167) x 1     [] Other:  [] TA x       [] Mech Traction (61712)  [] ES(attended) (57261)      [] ES (un) (04094):    ASSESSMENT:  See eval    GOALS:     Short Term Goals: To be achieved in: 2 weeks  1. Independent in HEP and progression per patient tolerance, in order to prevent re-injury. []? Progressing: [x]? Met: []? Not Met: []? Adjusted       2. Patient will have a decrease in pain to facilitate improvement in movement, function, and ADLs as indicated by Functional Deficits. []? Progressing: [x]? Met: []? Not Met: []? Adjusted          Long Term Goals: To be achieved in: 12 weeks  1. Disability index score of 20% or less for the Oswestry to assist with reaching prior level of function. []? Progressing: []? Met: []? Not Met: []? Adjusted      2. Patient will demonstrate increased lumbar spine AROM to allow for ability to perform all functional activities without pain or restriction as indicated by patients Functional Deficits. []? Progressing: []? Met: []? Not Met: []? Adjusted       3. Patient will demonstrate an increase in strength of LE to 4+/5 to allow for ability to perform high demanding household chores without pain or restircion as indicated by patients Functional Deficits. []? Progressing: []? Met: []? Not Met: []? Adjusted       4. Patient will return to all transfers, work activities, and functional activities without increased symptoms or restriction. []? Progressing: []? Met: []? Not Met: []? Adjusted       5. Patient will have 0/10 pain with ADL's.  []? Progressing: []? Met: []? Not Met: []? Adjusted       6. Patient stated goal: Be able to garden this spring and summer without pain or restriction. []? Progressing: []? Met: []? Not Met: []?  Adjusted        Overall Progression Towards Functional goals/ Treatment Progress Update:  [x] Patient is progressing as expected towards functional goals listed. [] Progression is slowed due to complexities/Impairments listed. [] Progression has been slowed due to co-morbidities. [] Plan just implemented, too soon to assess goals progression <30days   [] Goals require adjustment due to lack of progress  [] Patient is not progressing as expected and requires additional follow up with physician  [] Other    Prognosis for POC: [x] Good [] Fair  [] Poor    Patient requires continued skilled intervention: [x] Yes  [] No    Treatment/Activity Tolerance:  [x] Patient able to complete treatment  [] Patient limited by fatigue  [] Patient limited by pain    [] Patient limited by other medical complications  [] Other:     Return to Play: (if applicable)   []  Stage 1: Intro to Strength   []  Stage 2: Return to Run and Strength   []  Stage 3: Return to Jump and Strength   []  Stage 4: Dynamic Strength and Agility   []  Stage 5: Sport Specific Training     []  Ready to Return to Play, Meets All Above Stages   []  Not Ready for Return to Sports   Comments:                         PLAN: See eval  [x] Continue per plan of care [] Alter current plan (see comments above)  [] Plan of care initiated [] Hold pending MD visit [] Discharge    Electronically signed by:  Jeff Oleary PTA    Note: If patient does not return for scheduled/ recommended follow up visits, this note will serve as a discharge from care along with most recent update on progress.

## 2022-03-03 ENCOUNTER — HOSPITAL ENCOUNTER (OUTPATIENT)
Dept: PHYSICAL THERAPY | Age: 77
Setting detail: THERAPIES SERIES
Discharge: HOME | End: 2022-03-03
Payer: MEDICARE

## 2022-03-03 PROCEDURE — 97140 MANUAL THERAPY 1/> REGIONS: CPT | Performed by: PHYSICAL THERAPY ASSISTANT

## 2022-03-03 PROCEDURE — 97112 NEUROMUSCULAR REEDUCATION: CPT | Performed by: PHYSICAL THERAPY ASSISTANT

## 2022-03-03 PROCEDURE — 97110 THERAPEUTIC EXERCISES: CPT | Performed by: PHYSICAL THERAPY ASSISTANT

## 2022-03-03 NOTE — FLOWSHEET NOTE
[facility], [address] [place] [address] Marisa Luz, [address]  Phone: [phone], [phone]    Physical Therapy Treatment Note/ Progress Report:     Date:  3/3/2022    Patient Name:  Mandy Espino    :  1945  MRN: [id number]  Restrictions/Precautions:    Medical/Treatment Diagnosis Information:  · Diagnosis: Degnerative Disc Disease of Lumbar Spine  · Treatment Diagnosis: E48.56  Insurance/Certification information:  PT Insurance Information: [employer]  Physician Information:  Referring Practitioner: IFTIKHAR Pedraza CNP  Has the plan of care been signed (Y/N):        []  Yes  [x]  No     Date of Patient follow up with Physician: In may, not sure on specific date    Is this a Progress Report:     []  Yes  [x]  No      If Yes:  Date Range for reporting period:  Initial Eval: 2022  Beginnin2022 --- Ending: 3/10/2022    Progress report will be due (10 Rx or 30 days whichever is less): 3/11/1878     Recertification will be due (POC Duration  / 90 days whichever is less): 2022     Visit # Insurance Allowable Auth Required   In Person [address] []  Yes     []  No    [employer]   []  Yes     []  No    Total 6       Functional Scale: Oswestry 40% (Score 20/50)   Date assessed: 2022      Latex Allergy:  [x]NO      []YES  Preferred Language for Healthcare:   [x]English       []other:    Pain level: currently 2/10; worst 8/10     SUBJECTIVE: States she is hurting all over today. Did not sleep well last night. States she is more achy than an outright pain across bilateral low back. Discussed contacted Cristel Lee again if symptoms don't change with the alterations we made in visit today to further discuss possible MRI. Patient states she is interested in seeing a pain management doctor. Focused significantly on TA contraction today.      OBJECTIVE: See eval   Observation:    Test measurements:      RESTRICTIONS/PRECAUTIONS: HTN, Diabetes, OA, Anxiety/Depression, Afib    Exercises/Interventions:   Therapeutic Ex (47230)  Therapeutic Activity (06497)  NMR re-education (94927) Sets/Reps Notes/CUES   Bike        HR / slant board     Standing Marches    Standing HS Curl     Standing Hip ABD         Mini Squats     FSU         LAQ / SAQ   2x10 R, L /  x20 5\" hold     TA contraction  20 x 5\"    PPT 20x     LTR 20x ea    BKFO 20x ea    SLR  2 x 5 ea    Supine Alt Marches  20x ea w/ TA contraction   Bridges  X7 3\" hold    Clamshells x20    Single knee to chest 5\" x10 ea          Piriformis stretch 3x30\" R, L     Supine HS Stretch  3x30\" R, L                                             Manual Intervention (20572)     Manual PROM lumbar spine- flx/ext 5'    STM to lumbar paraspinals  5'    Manual lumbar traction  5'                        Shanna Booker access code: [email or web address]  Access Code: [email or web address]  URL: [email or web address]  Date: 02/08/2022  Prepared by: Arabella Purvis    Exercises  Supine Posterior Pelvic Tilt - 2 x daily - 7 x weekly - 2 sets - 10 reps - 5 s hold  Supine March with Alternating Leg Lifts - 2 x daily - 7 x weekly - 3 sets - 10 reps  Hooklying Lumbar Rotation - 2 x daily - 7 x weekly - 3 sets - 10 reps  Supine Transversus Abdominis Bracing - Hands on Stomach - 2 x daily - 7 x weekly - 3 sets - 10 reps - 5 s hold  Bent Knee Fallouts - 2 x daily - 7 x weekly - 3 sets - 10 reps  Straight Leg Raise - 2 x daily - 7 x weekly - 3 sets - 10 reps             Patient Education 10'  Pt education with HEP and progression of PT along with compliance with HEP to aide with formal PT for optimal outcomes. Therapeutic Exercise and NMR EXR  [x] (17809) Provided verbal/tactile cueing for activities related to strengthening, flexibility, endurance, ROM for improvements in LE, proximal hip, and core control with self care, mobility, lifting, ambulation.   [x] (90094) Provided verbal/tactile cueing for activities related to improving balance, coordination, kinesthetic sense, posture, motor skill, proprioception to assist with LE, proximal hip, and core control in self-care, mobility, lifting, ambulation and eccentric single leg control. NMR and Therapeutic Activities:    [x] (09648 or 96015) Provided verbal/tactile cueing for activities related to improving balance, coordination, kinesthetic sense, posture, motor skill, proprioception and motor activation to allow for proper function of core, proximal hip and LE with self-care and ADLs and functional mobility. [x] (63287) Gait Re-education- Provided training and instruction to the patient for proper LE, core and proximal hip recruitment and positioning and eccentric body weight control with ambulation re-education including up and down stairs     Home Exercise Program:    [x] (64240) Reviewed/Progressed HEP activities related to strengthening, flexibility, endurance, ROM of core, proximal hip and LE for functional self-care, mobility, lifting and ambulation/stair navigation   [x] (67555) Reviewed/Progressed HEP activities related to improving balance, coordination, kinesthetic sense, posture, motor skill, proprioception of core, proximal hip and LE for self-care, mobility, lifting, and ambulation/stair navigation      Manual Treatments:  PROM / STM / Oscillations-Mobs:  G-I, II, III, IV (PA's, Inf., Post.)  [x] (89615) Provided manual therapy to mobilize LE, proximal hip and/or LS spine soft tissue/joints for the purpose of modulating pain, promoting relaxation, increasing ROM, reducing/eliminating soft tissue swelling/inflammation/restriction, improving soft tissue extensibility and allowing for proper ROM for normal function with self-care, mobility, lifting and ambulation.      Modalities:       Charges:  Timed Code Treatment Minutes: 39'   Total Treatment Minutes:  45'   BWC:  TE TIME:  NMR TIME:  MANUAL TIME:  UNTIMED MINUTES:  Medicare Total:           [] EVAL (LOW) 19428 (typically 20 minutes face-to-face) [] EVAL (MOD) 90554 (typically 30 minutes face-to-face)  [] EVAL (HIGH) 44198 (typically 45 minutes face-to-face)  [] RE-EVAL      [x] HC(35110) x 1     [] IONTO  [x] NMR (28431) x 1     [] VASO  [x] Manual (77093) x 1     [] Other:  [] TA x       [] Mech Traction (22497)  [] ES(attended) (02581)      [] ES (un) (73626):    ASSESSMENT:  See eval    GOALS:     Short Term Goals: To be achieved in: 2 weeks  1. Independent in HEP and progression per patient tolerance, in order to prevent re-injury. []? Progressing: [x]? Met: []? Not Met: []? Adjusted       2. Patient will have a decrease in pain to facilitate improvement in movement, function, and ADLs as indicated by Functional Deficits. []? Progressing: [x]? Met: []? Not Met: []? Adjusted          Long Term Goals: To be achieved in: 12 weeks  1. Disability index score of 20% or less for the Oswestry to assist with reaching prior level of function. []? Progressing: []? Met: []? Not Met: []? Adjusted      2. Patient will demonstrate increased lumbar spine AROM to allow for ability to perform all functional activities without pain or restriction as indicated by patients Functional Deficits. []? Progressing: []? Met: []? Not Met: []? Adjusted       3. Patient will demonstrate an increase in strength of LE to 4+/5 to allow for ability to perform high demanding household chores without pain or restircion as indicated by patients Functional Deficits. []? Progressing: []? Met: []? Not Met: []? Adjusted       4. Patient will return to all transfers, work activities, and functional activities without increased symptoms or restriction. []? Progressing: []? Met: []? Not Met: []? Adjusted       5. Patient will have 0/10 pain with ADL's.  []? Progressing: []? Met: []? Not Met: []? Adjusted       6. Patient stated goal: Be able to garden this spring and summer without pain or restriction. []? Progressing: []? Met: []? Not Met: []?  Adjusted        Overall Progression Towards Functional goals/ Treatment Progress Update:  [x] Patient is progressing as expected towards functional goals listed. [] Progression is slowed due to complexities/Impairments listed. [] Progression has been slowed due to co-morbidities. [] Plan just implemented, too soon to assess goals progression <30days   [] Goals require adjustment due to lack of progress  [] Patient is not progressing as expected and requires additional follow up with physician  [] Other    Prognosis for POC: [x] Good [] Fair  [] Poor    Patient requires continued skilled intervention: [x] Yes  [] No    Treatment/Activity Tolerance:  [x] Patient able to complete treatment  [] Patient limited by fatigue  [] Patient limited by pain    [] Patient limited by other medical complications  [] Other:     Return to Play: (if applicable)   []  Stage 1: Intro to Strength   []  Stage 2: Return to Run and Strength   []  Stage 3: Return to Jump and Strength   []  Stage 4: Dynamic Strength and Agility   []  Stage 5: Sport Specific Training     []  Ready to Return to Play, Meets All Above Stages   []  Not Ready for Return to Sports   Comments:                         PLAN: See eval  [x] Continue per plan of care [] Alter current plan (see comments above)  [] Plan of care initiated [] Hold pending MD visit [] Discharge    Electronically signed by:  Anisa Rooney PTA    Note: If patient does not return for scheduled/ recommended follow up visits, this note will serve as a discharge from care along with most recent update on progress.

## 2022-03-07 ENCOUNTER — HOSPITAL ENCOUNTER (OUTPATIENT)
Dept: PHYSICAL THERAPY | Age: 77
Setting detail: THERAPIES SERIES
Discharge: HOME | End: 2022-03-07
Payer: MEDICARE

## 2022-03-07 PROCEDURE — 97140 MANUAL THERAPY 1/> REGIONS: CPT | Performed by: PHYSICAL THERAPY ASSISTANT

## 2022-03-07 PROCEDURE — 97112 NEUROMUSCULAR REEDUCATION: CPT | Performed by: PHYSICAL THERAPY ASSISTANT

## 2022-03-07 PROCEDURE — 97110 THERAPEUTIC EXERCISES: CPT | Performed by: PHYSICAL THERAPY ASSISTANT

## 2022-03-07 NOTE — FLOWSHEET NOTE
[facility], [address] [place], [address]  Phone: [phone], [phone]    Date: 3/7/2022          Patient Name; :  Yung Leija; 1945   Dx/ICD Code:  Degnerative Disc Disease of Lumbar Spine  Treatment Diagnosis: M51.36      Physician: Darrel Woods        Total PT Visits: 7     Measures Previous Current   Pain (0-10) 8 4-5   Disability % 40% 14%     Specific Functional Improvements & Impressions:  Patient continues to have intermittent back pain but reports that symptoms are less intense versus prior to therapy. Patient continues to need guided cues for proper form and neuromuscular control and would benefit from a few more visits to guide towards an independent HEP. Plan & Recommendations:  [x] Continue rehabilitation due to objective improvement and continued functional deficits with frequency and duration: 1-2 x week - 4 weeks  [x] Progress toward  []GAP, []Work Conditioning, [x]Independent HEP   [] Discharge due to   [] All goals achieved, [] Maximized \"medical necessity\" [] No subjective or objective improvements      Electronically signed by:  Manoj Maxwell, PTA; Eliot Jose PT,MPT,ATC, cert DN    Therapy Plan of Care Re-Certification  This patient has been re-evaluated for physical therapy services and for therapy to continue, Medicare, Medicaid and other insurances require periodic physician review of the treatment plan.  Please review the above re-evaluation and verify that you agree with plan of care as established above by signing the attached document and return it to our office or note changes to established plan below  [] Follow treatment plan as above [] Discontinue physical therapy  [] Change plan to:                                 __________________________________________________    Physician Signature:____________________________________ Date:____________  By signing above, therapists plan is approved by physician    If you have any questions or concerns, please don't hesitate to call. Thank you for your referral.    [address] office  ([phone])     Fax [phone]     Physical Therapy Treatment Note/ Progress Report:     Date:  3/7/2022    Patient Name:  Mandy Espino    :  1945  MRN: [id number]  Restrictions/Precautions:    Medical/Treatment Diagnosis Information:  · Diagnosis: Degnerative Disc Disease of Lumbar Spine  · Treatment Diagnosis: X23.48  Insurance/Certification information:  PT Insurance Information: [employer]  Physician Information:  Referring Practitioner: IFTIKHAR Pedraza CNP  Has the plan of care been signed (Y/N):        []  Yes  [x]  No     Date of Patient follow up with Physician:  In may, not sure on specific date    Is this a Progress Report:     []  Yes  [x]  No      If Yes:  Date Range for reporting period:  Initial Eval: 2022  Beginnin2022 --- Ending: 3/10/2022  Beginning: 3/7/2022 --- Endin2022        Progress report will be due (10 Rx or 30 days whichever is less): 3/5/6569     Recertification will be due (POC Duration  / 90 days whichever is less): 2022     Visit # Insurance Allowable Auth Required   In Person 7 Med Nec []  Yes     []  No    Tele Health   []  Yes     []  No    Total 7       Functional Scale: Oswestry 40% (Score 20/50)   Date assessed: 2022  Functional Scale: Oswestry: 14% (Score 7/50)   Date assessed: 3/7/2022      Latex Allergy:  [x]NO      []YES  Preferred Language for Healthcare:   [x]English       []other:    Pain level: currently 4-5/10;      SUBJECTIVE: \"My back continues to be sore but finally doing the exercises correctly and that seems to be doing better\"     OBJECTIVE: See eval   Observation:    Test measurements:      RESTRICTIONS/PRECAUTIONS: HTN, Diabetes, OA, Anxiety/Depression, Afib    Exercises/Interventions:   Therapeutic Ex (03312)  Therapeutic Activity (97848)  NMR re-education (48136) Sets/Reps Notes/CUES Bike 5' L5          HR / slant board  X30  3x30\"    Standing Marches x30    Standing HS Curl  2x15    Standing Hip ABD  2x15         Mini Squats  x20    FSU  6\" x20 R,  X20 L         LAQ / SAQ   2x10 R, L /  x20 5\" hold     TA contraction  20 x 5\"    PPT 20x     LTR 20x ea    BKFO 20x ea    SLR  x10 ea R, L    Supine Alt Marches  20x ea w/ TA contraction   Bridges  x7 3\" hold    Clamshells x20    Single knee to chest 5\" x10 ea          Piriformis stretch 3x30\" R, L     Supine HS Stretch  3x30\" R, L                                             Manual Intervention (02565)     Manual PROM lumbar spine- flx/ext 5'    STM to lumbar paraspinals  5'    Manual lumbar traction  5'                        [address] [place] [address] access code: [email or web address]  Access Code: [email or web address]  URL: [email or web address]  Date: 02/08/2022  Prepared by: Ally Keane    Exercises  Supine Posterior Pelvic Tilt - 2 x daily - 7 x weekly - 2 sets - 10 reps - 5 s hold  Supine March with Alternating Leg Lifts - 2 x daily - 7 x weekly - 3 sets - 10 reps  Hooklying Lumbar Rotation - 2 x daily - 7 x weekly - 3 sets - 10 reps  Supine Transversus Abdominis Bracing - Hands on Stomach - 2 x daily - 7 x weekly - 3 sets - 10 reps - 5 s hold  Bent Knee Fallouts - 2 x daily - 7 x weekly - 3 sets - 10 reps  Straight Leg Raise - 2 x daily - 7 x weekly - 3 sets - 10 reps             Patient Education 10'  Pt education with HEP and progression of PT along with compliance with HEP to aide with formal PT for optimal outcomes. Therapeutic Exercise and NMR EXR  [x] (62624) Provided verbal/tactile cueing for activities related to strengthening, flexibility, endurance, ROM for improvements in LE, proximal hip, and core control with self care, mobility, lifting, ambulation.   [x] (33921) Provided verbal/tactile cueing for activities related to improving balance, coordination, kinesthetic sense, posture, motor skill, proprioception to assist with LE, proximal hip, and core control in self-care, mobility, lifting, ambulation and eccentric single leg control. NMR and Therapeutic Activities:    [x] (96701 or 25107) Provided verbal/tactile cueing for activities related to improving balance, coordination, kinesthetic sense, posture, motor skill, proprioception and motor activation to allow for proper function of core, proximal hip and LE with self-care and ADLs and functional mobility. [x] (81468) Gait Re-education- Provided training and instruction to the patient for proper LE, core and proximal hip recruitment and positioning and eccentric body weight control with ambulation re-education including up and down stairs     Home Exercise Program:    [x] (17671) Reviewed/Progressed HEP activities related to strengthening, flexibility, endurance, ROM of core, proximal hip and LE for functional self-care, mobility, lifting and ambulation/stair navigation   [x] (36000) Reviewed/Progressed HEP activities related to improving balance, coordination, kinesthetic sense, posture, motor skill, proprioception of core, proximal hip and LE for self-care, mobility, lifting, and ambulation/stair navigation      Manual Treatments:  PROM / STM / Oscillations-Mobs:  G-I, II, III, IV (PA's, Inf., Post.)  [x] (30656) Provided manual therapy to mobilize LE, proximal hip and/or LS spine soft tissue/joints for the purpose of modulating pain, promoting relaxation, increasing ROM, reducing/eliminating soft tissue swelling/inflammation/restriction, improving soft tissue extensibility and allowing for proper ROM for normal function with self-care, mobility, lifting and ambulation.      Modalities:   [facility] 10'    Charges:  Timed Code Treatment Minutes: 61'   Total Treatment Minutes:  60'   BWC:  TE TIME:  NMR TIME:  MANUAL TIME:  UNTIMED MINUTES:  Medicare Total:           [] EVAL (LOW) 14150 (typically 20 minutes face-to-face)   [] EVAL (MOD) 44668 (typically 30 minutes face-to-face)  [] EVAL (HIGH) 423 5103 (typically 45 minutes face-to-face)  [] RE-EVAL      [x] ZU(22053) x 2     [] IONTO  [x] NMR (89831) x 1     [] VASO  [x] Manual (31108) x 1     [] Other:  [] TA x       [] Mech Traction (37678)  [] ES(attended) (43284)      [] ES (un) (69175):    ASSESSMENT:  See eval    GOALS:     Short Term Goals: To be achieved in: 2 weeks  1. Independent in HEP and progression per patient tolerance, in order to prevent re-injury. []? Progressing: [x]? Met: []? Not Met: []? Adjusted       2. Patient will have a decrease in pain to facilitate improvement in movement, function, and ADLs as indicated by Functional Deficits. []? Progressing: [x]? Met: []? Not Met: []? Adjusted          Long Term Goals: To be achieved in: 12 weeks  1. Disability index score of 20% or less for the Oswestry to assist with reaching prior level of function. []? Progressing: [x]? Met: []? Not Met: []? Adjusted      2. Patient will demonstrate increased lumbar spine AROM to allow for ability to perform all functional activities without pain or restriction as indicated by patients Functional Deficits. [x]? Progressing: []? Met: []? Not Met: []? Adjusted       3. Patient will demonstrate an increase in strength of LE to 4+/5 to allow for ability to perform high demanding household chores without pain or restircion as indicated by patients Functional Deficits. [x]? Progressing: []? Met: []? Not Met: []? Adjusted       4. Patient will return to all transfers, work activities, and functional activities without increased symptoms or restriction. [x]? Progressing: []? Met: []? Not Met: []? Adjusted       5. Patient will have 0/10 pain with ADL's. [x]? Progressing: []? Met: []? Not Met: []? Adjusted       6. Patient stated goal: Be able to garden this spring and summer without pain or restriction. [x]? Progressing: []? Met: []? Not Met: []?  Adjusted        Overall Progression Towards Functional goals/ Treatment Progress Update:  [x] Patient is progressing as expected towards functional goals listed. [] Progression is slowed due to complexities/Impairments listed. [] Progression has been slowed due to co-morbidities. [] Plan just implemented, too soon to assess goals progression <30days   [] Goals require adjustment due to lack of progress  [] Patient is not progressing as expected and requires additional follow up with physician  [] Other    Prognosis for POC: [x] Good [] Fair  [] Poor    Patient requires continued skilled intervention: [x] Yes  [] No    Treatment/Activity Tolerance:  [x] Patient able to complete treatment  [] Patient limited by fatigue  [] Patient limited by pain    [] Patient limited by other medical complications  [] Other:     Return to Play: (if applicable)   []  Stage 1: Intro to Strength   []  Stage 2: Return to Run and Strength   []  Stage 3: Return to Jump and Strength   []  Stage 4: Dynamic Strength and Agility   []  Stage 5: Sport Specific Training     []  Ready to Return to Play, Meets All Above Stages   []  Not Ready for Return to Sports   Comments:                         PLAN: See eval  [x] Continue per plan of care [] Alter current plan (see comments above)  [] Plan of care initiated [] Hold pending MD visit [] Discharge    Electronically signed by:  Jerson Little, PTA; Tanner Jasmine PT,MPT,ATC, cert DN    Note: If patient does not return for scheduled/ recommended follow up visits, this note will serve as a discharge from care along with most recent update on progress.

## 2022-03-09 ENCOUNTER — OFFICE VISIT (OUTPATIENT)
Dept: CARDIOLOGY CLINIC | Age: 77
End: 2022-03-09
Payer: MEDICARE

## 2022-03-09 VITALS
OXYGEN SATURATION: 99 % | TEMPERATURE: 98.3 F | WEIGHT: 144 LBS | SYSTOLIC BLOOD PRESSURE: 114 MMHG | DIASTOLIC BLOOD PRESSURE: 60 MMHG | HEART RATE: 91 BPM | BODY MASS INDEX: 20.62 KG/M2 | HEIGHT: 70 IN

## 2022-03-09 DIAGNOSIS — E78.5 HYPERLIPIDEMIA, UNSPECIFIED HYPERLIPIDEMIA TYPE: ICD-10-CM

## 2022-03-09 DIAGNOSIS — R00.2 PALPITATIONS: ICD-10-CM

## 2022-03-09 DIAGNOSIS — I10 HYPERTENSION, UNSPECIFIED TYPE: ICD-10-CM

## 2022-03-09 DIAGNOSIS — I48.0 PAROXYSMAL ATRIAL FIBRILLATION (HCC): Primary | ICD-10-CM

## 2022-03-09 PROCEDURE — 99204 OFFICE O/P NEW MOD 45 MIN: CPT | Performed by: INTERNAL MEDICINE

## 2022-03-09 NOTE — PROGRESS NOTES
[address] [facility]   Cardiovascular Evaluation    PATIENT: Brianna Mejia  DATE: 3/9/2022  MRN: <[id number]>  CSN: [id number]  : 1945    Primary Care Doctor/Referring provider: IFTIKHAR Grayson CNP, No admitting provider for patient encounter. Reason for evaluation/Chief complaint:   New Patient, Establish Cardiologist, and Other (No new concerns)      Subjective:    History of present illness on initial date of evaluation:   Brianna Mejia is a 78 y.o. patient who presents as a new patient for cardiology evaluation and treatment of atrial fibrillation. She is referred by Lopez Beth. She previously saw Dr. Ida Bone with [facility] on 21. She has a past medical history including atrial fibrillation, hypertension, hyperlipidemia, anxiety, diabetes mellitus, gastroesophageal reflux disease without esophagitis. She had an echocardiogram 18 which showed LVEF=59-63%. There was trivial Aortic valve regurgitation. There was mild Mitral valve regurgitation. She underwent a cardioversion on 20. She had an EKG on 21 which showed Atrial fibrillation 96 bpm. She wore a 48 hour monitor 21 which showed rate controlled Atrial Fibrillation. Today, she reports she lives in this area and it is just too far to drive so she wants a new doctor closer to home. She reports she had some fluttering and shortness of breath and no changes were made to her medications so she was unhappy with her care. She feels the fluttering when she is hurrying. She has not been very physically active for awhile and she wonders if part of her problem is breathing related.       Patient Active Problem List   Diagnosis    Postoperative anemia    Degenerative disc disease, lumbar    Anxiety    Paroxysmal atrial fibrillation (HCC)    Gastroesophageal reflux disease without esophagitis    Mixed incontinence    Hx of adenomatous colonic polyps    Hypertension    Diabetes mellitus ([facility]    Hyperlipidemia    Palpitations         Cardiac Testing: I have reviewed the findings below. EKG:  ECHO:   STRESS TEST:  CATH:  BYPASS:  VASCULAR:    Past Medical History:   has a past medical history of Anemia, Arthritis, Atrial fibrillation ([facility] Back pain, Diabetes mellitus ([facility] [address] [facility] Hyperlipidemia, Hypertension, and Total hip replacement. Surgical History:   has a past surgical history that includes Cholecystectomy; Sinus endoscopy; Nasal septum surgery; Dental surgery; Tubal ligation; Tonsillectomy; joint replacement (8-); Colonoscopy (2007); Colonoscopy (08/16/2017); Cardioversion; and Cataract removal.     Social History:   reports that she quit smoking about 14 years ago. Her smoking use included cigarettes. She started smoking about 58 years ago. She has a 22.00 pack-year smoking history. She has never used smokeless tobacco. She reports that she does not drink alcohol and does not use drugs. Family History:  No evidence for sudden cardiac death or premature CAD    Medications:  Reviewed and are listed in nursing record. and/or listed below  Outpatient Medications:  Prior to Admission medications    Medication Sig Start Date End Date Taking? Authorizing Provider   gabapentin (NEURONTIN) 100 MG capsule Take 3 capsules by mouth daily for 30 days.  2/28/22 3/30/22 Yes IFTIKHAR Jamison CNP   oxybutynin (DITROPAN) 5 MG tablet TAKE ONE TABLET BY MOUTH 3 TIMES A DAY 1/25/22  Yes IFTIKHAR Jamison CNP   FLUoxetine (PROZAC) 40 MG capsule Take 1 capsule by mouth daily 9/20/21  Yes IFTIKHAR Perez CNP   atorvastatin (LIPITOR) 40 MG tablet TAKE ONE TABLET BY MOUTH EVERY DAY 4/12/21  Yes IFTIKHAR Perez CNP   omeprazole (PRILOSEC) 40 MG delayed release capsule TAKE ONE CAPSULE BY MOUTH EVERY DAY 4/12/21  Yes IFTIKHAR Perez CNP   ELIQUIS 5 MG TABS tablet Take 1 tablet by mouth 2 times daily  Patient taking differently: Take 5 mg by mouth 2 times daily  6/12/17 Yes Rachael Austin MD   metoprolol (TOPROL-XL) 50 MG XL tablet Take 50 mg by mouth daily  5/24/16  Yes Gudelia Ramos MD   propafenone (RYTHMOL SR) 225 MG SR capsule Take 150 mg by mouth every 8 hours    Yes Gudelia Ramos MD       In-patient schedule medications:        Infusion Medications: Allergies:  Naproxen and Lisinopril     Review of Systems:   All 14 point review of symptoms completed. Pertinent positives identified in the HPI, all other review of symptoms findings as below.      Review of Systems - History obtained from the patient  General ROS: negative for - chills, fever or night sweats  Psychological ROS: negative for - disorientation or hallucinations  Ophthalmic ROS: negative for - dry eyes, eye pain or loss of vision  ENT ROS: negative for - nasal discharge or sore throat  Allergy and Immunology ROS: negative for - hives or itchy/watery eyes  Hematological and Lymphatic ROS: negative for - jaundice or night sweats  Endocrine ROS: negative for - mood swings or temperature intolerance  Breast ROS: deferred  Respiratory ROS: negative for - hemoptysis or stridor  Gastrointestinal ROS: no abdominal pain, change in bowel habits, or black or bloody stools  Genito-Urinary ROS: no dysuria, trouble voiding, or hematuria  Musculoskeletal ROS: negative for - gait disturbance, joint pain or joint stiffness  Neurological ROS: negative for - seizures or speech problems  Dermatological ROS: negative for - rash or skin lesion changes      Physical Examination:    /60   Pulse 91   Temp 98.3 °F (36.8 °C)   Ht 5' 10\" (1.778 m)   Wt 144 lb (65.3 kg)   LMP  (LMP Unknown)   SpO2 99%   BMI 20.66 kg/m²   /60   Pulse 91   Temp 98.3 °F (36.8 °C)   Ht 5' 10\" (1.778 m)   Wt 144 lb (65.3 kg)   LMP  (LMP Unknown)   SpO2 99%   BMI 20.66 kg/m²    Weight: 144 lb (65.3 kg)     Wt Readings from Last 3 Encounters:   03/09/22 144 lb (65.3 kg)   11/23/21 136 lb (61.7 kg)   11/23/21 136 lb (61.7 kg) No intake or output data in the 24 hours ending 03/09/22 1358    General Appearance:  Alert, cooperative, no distress, appears stated age   Head:  Normocephalic, without obvious abnormality, atraumatic   Eyes:  PERRL, conjunctiva/corneas clear       Nose: Nares normal, no drainage or sinus tenderness   Throat: Lips, mucosa, and tongue normal   Neck: Supple, symmetrical, trachea midline, no adenopathy, thyroid: not enlarged, symmetric, no tenderness/mass/nodules, no carotid bruit or JVD       Lungs:   Clear to auscultation bilaterally, respirations unlabored   Chest Wall:  No tenderness or deformity   Heart:  Regular rhythm and normal rate; S1, S2 are normal; no murmur noted; no rub or gallop   Abdomen:   Soft, non-tender, bowel sounds active all four quadrants,  no masses, no organomegaly           Extremities: Extremities normal, atraumatic, no cyanosis or edema   Pulses: 2+ and symmetric   Skin: Skin color, texture, turgor normal, no rashes or lesions   Pysch: Normal mood and affect   Neurologic: Normal gross motor and sensory exam.         Labs  No results for input(s): WBC, HGB, HCT, MCV, PLT in the last 72 hours. No results for input(s): CREATININE, BUN, NA, K, CL, CO2 in the last 72 hours. No results for input(s): INR, PROTIME in the last 72 hours. No results for input(s): TROPONINI in the last 72 hours. Invalid input(s): PRO-BNP  No results for input(s): CHOL, LDL, HDL in the last 72 hours. Invalid input(s): TG      Imaging:  I have reviewed the below testing personally and my interpretation is below. EKG:  CXR:      Assessment:  78 y.o. patient with:  Active Problems:    * No active hospital problems. *  Resolved Problems:    * No resolved hospital problems. *      Problem List Items Addressed This Visit     Paroxysmal atrial fibrillation ([address] - Primary    Hypertension    Hyperlipidemia    Palpitations          Plan:  1.  You have atrial fibrillation which is very common and very treatable   -there are multiple medications   -there are multiple treatment options  2. You can meet with one of my partners who take care of electrical problems like atrial fibrillation. -I would like for them to discuss if you are a good candidate for an ablation procedure    -they go into your heart with small catheters and you are asleep    -this procedure is meant to help keep you from flipping into atrial fibrillation   -if you would like to do this option please call my office and let me know. 3. You are on the right medications for atrial fibrillation  4. I would recommend you try to get out and get more active. Follow up with me in 1 year    This note is scribed in the presence of Dr. Sid Hadley. Bennie Castañeda by Jason Lazaro RN.    I, Dr. Beck Mason, personally performed the services described in this documentation, as scribed by the above signed scribe in my presence. It is both accurate and complete to my knowledge. I agree with the details independently gathered by the clinical support staff, while the remaining scribed note accurately describes my personal service to the patient. Medical Decision Making: The following items were considered in medical decision making:  Independent review of images  Review / order clinical lab tests  Review / order radiology tests  Decision to obtain old records  Review and summation of old records as accessed through [facility] (a summary of my findings in these old records)      Time Based Itemization  A total of 60 minutes was spent on today's patient encounter. If applicable, non-patient-facing activities:  (X)Preparing to see the patient and reviewing records  (X) Individual interpretation of results  ( ) Discussion or coordination of care with other health care professionals  () Ordering of unique tests, medications, or procedures  (X) Documentation within the EHR               All questions and concerns were addressed to the patient/family. Alternatives to my treatment were discussed. The note was completed using EMR. Every effort was made to ensure accuracy; however, inadvertent computerized transcription errors may be present.     Ashley Regan MD, [place], [place]  [phone] [facility] office  [phone] Main central  3/9/2022  1:58 PM

## 2022-03-09 NOTE — PATIENT INSTRUCTIONS
Plan:  1. You have atrial fibrillation which is very common and very treatable   -there are multiple medications   -there are multiple treatment options  2. You can meet with one of my partners who take care of electrical problems like atrial fibrillation. -I would like for them to discuss if you are a good candidate for an ablation procedure    -they go into your heart with small catheters and you are asleep    -this procedure is meant to help keep you from flipping into atrial fibrillation   -if you would like to do this option please call my office and let me know. 3. You are on the right medications for atrial fibrillation  4. I would recommend you try to get out and get more active.     Follow up with me in 1 year

## 2022-03-10 ENCOUNTER — HOSPITAL ENCOUNTER (OUTPATIENT)
Dept: PHYSICAL THERAPY | Age: 77
Setting detail: THERAPIES SERIES
Discharge: HOME | End: 2022-03-10
Payer: MEDICARE

## 2022-03-10 PROCEDURE — 97112 NEUROMUSCULAR REEDUCATION: CPT | Performed by: PHYSICAL THERAPY ASSISTANT

## 2022-03-10 PROCEDURE — 97140 MANUAL THERAPY 1/> REGIONS: CPT | Performed by: PHYSICAL THERAPY ASSISTANT

## 2022-03-10 PROCEDURE — 97110 THERAPEUTIC EXERCISES: CPT | Performed by: PHYSICAL THERAPY ASSISTANT

## 2022-03-10 NOTE — FLOWSHEET NOTE
[facility], [address] [place] [address] Marisa Luz, [address]  Phone: [phone], [phone]      Physical Therapy Treatment Note/ Progress Report:     Date:  3/10/2022    Patient Name:  Marium Carmona    :  1945  MRN: [id number]  Restrictions/Precautions:    Medical/Treatment Diagnosis Information:  · Diagnosis: Degnerative Disc Disease of Lumbar Spine  · Treatment Diagnosis: A67.23  Insurance/Certification information:  PT Insurance Information: [employer]  Physician Information:  Referring Practitioner: IFTIKHAR Lemos CNP  Has the plan of care been signed (Y/N):        []  Yes  [x]  No     Date of Patient follow up with Physician: In may, not sure on specific date    Is this a Progress Report:     []  Yes  [x]  No      If Yes:  Date Range for reporting period:  Initial Eval: 2022  Beginnin2022 --- Ending: 3/10/2022  Beginning: 3/7/2022 --- Endin2022        Progress report will be due (10 Rx or 30 days whichever is less): 6/3/6059     Recertification will be due (POC Duration  / 90 days whichever is less): 2022     Visit # Insurance Allowable Auth Required   In Person 8 Med Nec []  Yes     []  No    Tele Health   []  Yes     []  No    Total 8       Functional Scale: Oswestry 40% (Score 20/50)   Date assessed: 2022  Functional Scale: Oswestry: 14% (Score 7/50)   Date assessed: 3/7/2022      Latex Allergy:  [x]NO      []YES  Preferred Language for Healthcare:   [x]English       []other:    Pain level: currently 4/10;      SUBJECTIVE: Has been more sore in the right leg since last visit. Leg was sore and very weak - felt like it was giving way with bearing. Pain today in the bilateral low back and to the right knee. Feels she is doing better with her ab sets. Will see in one week to assess progress.      OBJECTIVE: See eval   Observation:    Test measurements:      RESTRICTIONS/PRECAUTIONS: HTN, Diabetes, OA, Anxiety/Depression, Afib    Exercises/Interventions:   Therapeutic Ex (20573)  Therapeutic Activity (64325)  NMR re-education (79421) Sets/Reps Notes/CUES   Bike        HR / slant board     Standing Marches    Standing HS Curl     Standing Hip ABD         Mini Squats     FSU         LAQ / SAQ     TA contraction  20 x 5\"    PPT 20x     LTR 20x ea    BKFO 20x ea    SLR  x10 ea R, L    Supine Alt Marches  20x ea w/ TA contraction   Bridges      Clamshells x20    Single knee to chest 5\" x10 ea          Piriformis stretch 3x30\" R, L     Supine HS Stretch  3x30\" R, L               Gave low back posture sheet and reviewed body mechanics  10'                             Manual Intervention (73962)     Manual PROM lumbar spine- flx/ext 5'    STM to lumbar paraspinals  5'    Manual lumbar traction  5'                        [address] [place] [address] access code: [email or web address]  Access Code: [email or web address]  URL: [email or web address]  Date: 02/08/2022  Prepared by: Ally Keane    Exercises  Supine Posterior Pelvic Tilt - 2 x daily - 7 x weekly - 2 sets - 10 reps - 5 s hold  Supine March with Alternating Leg Lifts - 2 x daily - 7 x weekly - 3 sets - 10 reps  Hooklying Lumbar Rotation - 2 x daily - 7 x weekly - 3 sets - 10 reps  Supine Transversus Abdominis Bracing - Hands on Stomach - 2 x daily - 7 x weekly - 3 sets - 10 reps - 5 s hold  Bent Knee Fallouts - 2 x daily - 7 x weekly - 3 sets - 10 reps  Straight Leg Raise - 2 x daily - 7 x weekly - 3 sets - 10 reps             Patient Education 10'  Pt education with HEP and progression of PT along with compliance with HEP to aide with formal PT for optimal outcomes. Therapeutic Exercise and NMR EXR  [x] (27566) Provided verbal/tactile cueing for activities related to strengthening, flexibility, endurance, ROM for improvements in LE, proximal hip, and core control with self care, mobility, lifting, ambulation.   [x] (51732) Provided verbal/tactile cueing for activities related to improving balance, coordination, kinesthetic sense, posture, motor skill, proprioception to assist with LE, proximal hip, and core control in self-care, mobility, lifting, ambulation and eccentric single leg control. NMR and Therapeutic Activities:    [x] (85979 or 37362) Provided verbal/tactile cueing for activities related to improving balance, coordination, kinesthetic sense, posture, motor skill, proprioception and motor activation to allow for proper function of core, proximal hip and LE with self-care and ADLs and functional mobility. [x] (26656) Gait Re-education- Provided training and instruction to the patient for proper LE, core and proximal hip recruitment and positioning and eccentric body weight control with ambulation re-education including up and down stairs     Home Exercise Program:    [x] (35309) Reviewed/Progressed HEP activities related to strengthening, flexibility, endurance, ROM of core, proximal hip and LE for functional self-care, mobility, lifting and ambulation/stair navigation   [x] (04228) Reviewed/Progressed HEP activities related to improving balance, coordination, kinesthetic sense, posture, motor skill, proprioception of core, proximal hip and LE for self-care, mobility, lifting, and ambulation/stair navigation      Manual Treatments:  PROM / STM / Oscillations-Mobs:  G-I, II, III, IV (PA's, Inf., Post.)  [x] (30034) Provided manual therapy to mobilize LE, proximal hip and/or LS spine soft tissue/joints for the purpose of modulating pain, promoting relaxation, increasing ROM, reducing/eliminating soft tissue swelling/inflammation/restriction, improving soft tissue extensibility and allowing for proper ROM for normal function with self-care, mobility, lifting and ambulation.      Modalities:   [facility] 10'    Charges:  Timed Code Treatment Minutes: 61'   Total Treatment Minutes:  60'   BWC:  TE TIME:  NMR TIME:  MANUAL TIME:  UNTIMED MINUTES:  Medicare Total:           [] EVAL (LOW) 39018 (typically 20 minutes Overall Progression Towards Functional goals/ Treatment Progress Update:  [x] Patient is progressing as expected towards functional goals listed. [] Progression is slowed due to complexities/Impairments listed. [] Progression has been slowed due to co-morbidities. [] Plan just implemented, too soon to assess goals progression <30days   [] Goals require adjustment due to lack of progress  [] Patient is not progressing as expected and requires additional follow up with physician  [] Other    Prognosis for POC: [x] Good [] Fair  [] Poor    Patient requires continued skilled intervention: [x] Yes  [] No    Treatment/Activity Tolerance:  [x] Patient able to complete treatment  [] Patient limited by fatigue  [] Patient limited by pain    [] Patient limited by other medical complications  [] Other:     Return to Play: (if applicable)   []  Stage 1: Intro to Strength   []  Stage 2: Return to Run and Strength   []  Stage 3: Return to Jump and Strength   []  Stage 4: Dynamic Strength and Agility   []  Stage 5: Sport Specific Training     []  Ready to Return to Play, Meets All Above Stages   []  Not Ready for Return to Sports   Comments:                         PLAN: See eval  [x] Continue per plan of care [] Alter current plan (see comments above)  [] Plan of care initiated [] Hold pending MD visit [] Discharge    Electronically signed by:  Jeff Oleary PTA    Note: If patient does not return for scheduled/ recommended follow up visits, this note will serve as a discharge from care along with most recent update on progress.

## 2022-03-14 ENCOUNTER — APPOINTMENT (OUTPATIENT)
Dept: PHYSICAL THERAPY | Age: 77
End: 2022-03-14
Payer: MEDICARE

## 2022-03-17 ENCOUNTER — HOSPITAL ENCOUNTER (OUTPATIENT)
Dept: PHYSICAL THERAPY | Age: 77
Setting detail: THERAPIES SERIES
Discharge: HOME | End: 2022-03-17
Payer: MEDICARE

## 2022-03-17 PROCEDURE — 97140 MANUAL THERAPY 1/> REGIONS: CPT | Performed by: PHYSICAL THERAPY ASSISTANT

## 2022-03-17 PROCEDURE — 97110 THERAPEUTIC EXERCISES: CPT | Performed by: PHYSICAL THERAPY ASSISTANT

## 2022-03-17 PROCEDURE — 97112 NEUROMUSCULAR REEDUCATION: CPT | Performed by: PHYSICAL THERAPY ASSISTANT

## 2022-03-17 NOTE — FLOWSHEET NOTE
[facility], [address] [place] [address] Marisa Luz, [address]  Phone: [phone], [phone]      Physical Therapy Treatment Note/ Progress Report:     Date:  3/17/2022    Patient Name:  Sunny Hernandez    :  1945  MRN: [id number]  Restrictions/Precautions:    Medical/Treatment Diagnosis Information:  · Diagnosis: Degnerative Disc Disease of Lumbar Spine  · Treatment Diagnosis: R93.24  Insurance/Certification information:  PT Insurance Information: [employer]  Physician Information:  Referring Practitioner: IFTIKHAR Mcintyre CNP  Has the plan of care been signed (Y/N):        []  Yes  [x]  No     Date of Patient follow up with Physician: In may, not sure on specific date    Is this a Progress Report:     []  Yes  [x]  No      If Yes:  Date Range for reporting period:  Initial Eval: 2022  Beginnin2022 --- Ending: 3/10/2022  Beginning: 3/7/2022 --- Endin2022        Progress report will be due (10 Rx or 30 days whichever is less): 4/3/5733     Recertification will be due (POC Duration  / 90 days whichever is less): 2022     Visit # Insurance Allowable Auth Required   In Person 9 Med Nec []  Yes     []  No    Tele Health   []  Yes     []  No    Total 9       Functional Scale: Oswestry 40% (Score 20/50)   Date assessed: 2022  Functional Scale: Oswestry: 14% (Score 7/50)   Date assessed: 3/7/2022      Latex Allergy:  [x]NO      []YES  Preferred Language for Healthcare:   [x]English       []other:    Pain level: currently 4/10;      SUBJECTIVE: Had to open her heavy garage door (lifting up and overhead) today before she came and so she is hurting today. States she has had other ailments (IBS) all week so she hasn't had a good week. States she is unable to be active secondary to stomach and sinus issues over the past week. Has been laying down and sleeping for the most part. Notices a small improvement in the strength of her legs.   She did do one day of a little housecleaning. States it is very hard for her to change body posture habits but she is being more aware of it and is trying. Also notes it is easier to perform her pelvic tilts and she does feel her core getting stronger. OBJECTIVE: See eval   Observation:    Test measurements:      RESTRICTIONS/PRECAUTIONS: HTN, Diabetes, OA, Anxiety/Depression, Afib    Exercises/Interventions:   Therapeutic Ex (82209)  Therapeutic Activity (51670)  NMR re-education (02371) Sets/Reps Notes/CUES   Bike        HR / slant board     Standing Marches    Standing HS Curl     Standing Hip ABD         Mini Squats     FSU         LAQ / SAQ     TA contraction  20 x 5\"    PPT 20x     LTR 20x ea    BKFO 20x ea    SLR  2x10 ea R, L    Supine Alt Marches  20x ea w/ TA contraction   Bridges  X20 5\" hold    Clamshells x20    Single knee to chest 5\" x10 ea          Piriformis stretch 3x30\" R, L     Supine HS Stretch  3x30\" R, L               Gave low back posture sheet and reviewed body mechanics  10'         T-Band Row / extension  Red band x20 ea                    Manual Intervention (60340)     Manual PROM lumbar spine- flx/ext 5'    STM to lumbar paraspinals  5'    Manual lumbar traction  5'                        [address] access code: [email or web address]    Access Code: [email or web address]  Access Code: [email or web address]  URL: [email or web address]  Date: 02/08/2022  Prepared by: Ousmane Mcgowan    Exercises  Supine Posterior Pelvic Tilt - 2 x daily - 7 x weekly - 2 sets - 10 reps - 5 s hold  Supine March with Alternating Leg Lifts - 2 x daily - 7 x weekly - 3 sets - 10 reps  Hooklying Lumbar Rotation - 2 x daily - 7 x weekly - 3 sets - 10 reps  Supine Transversus Abdominis Bracing - Hands on Stomach - 2 x daily - 7 x weekly - 3 sets - 10 reps - 5 s hold  Bent Knee Fallouts - 2 x daily - 7 x weekly - 3 sets - 10 reps  Straight Leg Raise - 2 x daily - 7 x weekly - 3 sets - 10 reps             Patient Education 10'  Pt education with HEP and progression of PT along with compliance with HEP to aide with formal PT for optimal outcomes. Therapeutic Exercise and NMR EXR  [x] (34605) Provided verbal/tactile cueing for activities related to strengthening, flexibility, endurance, ROM for improvements in LE, proximal hip, and core control with self care, mobility, lifting, ambulation. [x] (23806) Provided verbal/tactile cueing for activities related to improving balance, coordination, kinesthetic sense, posture, motor skill, proprioception to assist with LE, proximal hip, and core control in self-care, mobility, lifting, ambulation and eccentric single leg control. NMR and Therapeutic Activities:    [x] (03866 or 25166) Provided verbal/tactile cueing for activities related to improving balance, coordination, kinesthetic sense, posture, motor skill, proprioception and motor activation to allow for proper function of core, proximal hip and LE with self-care and ADLs and functional mobility.    [x] (16891) Gait Re-education- Provided training and instruction to the patient for proper LE, core and proximal hip recruitment and positioning and eccentric body weight control with ambulation re-education including up and down stairs     Home Exercise Program:    [x] (70925) Reviewed/Progressed HEP activities related to strengthening, flexibility, endurance, ROM of core, proximal hip and LE for functional self-care, mobility, lifting and ambulation/stair navigation   [x] (32375) Reviewed/Progressed HEP activities related to improving balance, coordination, kinesthetic sense, posture, motor skill, proprioception of core, proximal hip and LE for self-care, mobility, lifting, and ambulation/stair navigation      Manual Treatments:  PROM / STM / Oscillations-Mobs:  G-I, II, III, IV (PA's, Inf., Post.)  [x] (36049) Provided manual therapy to mobilize LE, proximal hip and/or LS spine soft tissue/joints for the purpose of modulating pain, promoting relaxation, increasing ROM, reducing/eliminating soft tissue swelling/inflammation/restriction, improving soft tissue extensibility and allowing for proper ROM for normal function with self-care, mobility, lifting and ambulation. Modalities:   [facility] 10'    Charges:  Timed Code Treatment Minutes: 39'   Total Treatment Minutes:  45'   [facility]:  [facility] TIME:  MANUAL TIME:  UNTIMED MINUTES:  Medicare Total:           [] EVAL (LOW) 70199 (typically 20 minutes face-to-face)   [] EVAL (MOD) 95949 (typically 30 minutes face-to-face)  [] EVAL (HIGH) 08363 (typically 45 minutes face-to-face)  [] RE-EVAL      [x] NG(55821) x 1     [] IONTO  [x] NMR (70786) x 1     [] VASO  [x] Manual (83682) x 1     [] Other:  [] TA x       [] Mech Traction (72498)  [] ES(attended) (56197)      [] ES (un) (06099):    ASSESSMENT:  See eval    GOALS:     Short Term Goals: To be achieved in: 2 weeks  1. Independent in HEP and progression per patient tolerance, in order to prevent re-injury. []? Progressing: [x]? Met: []? Not Met: []? Adjusted       2. Patient will have a decrease in pain to facilitate improvement in movement, function, and ADLs as indicated by Functional Deficits. []? Progressing: [x]? Met: []? Not Met: []? Adjusted          Long Term Goals: To be achieved in: 12 weeks  1. Disability index score of 20% or less for the Oswestry to assist with reaching prior level of function. []? Progressing: [x]? Met: []? Not Met: []? Adjusted      2. Patient will demonstrate increased lumbar spine AROM to allow for ability to perform all functional activities without pain or restriction as indicated by patients Functional Deficits. [x]? Progressing: []? Met: []? Not Met: []? Adjusted       3. Patient will demonstrate an increase in strength of LE to 4+/5 to allow for ability to perform high demanding household chores without pain or restircion as indicated by patients Functional Deficits. [x]? Progressing: []? Met: []? Not Met: []?  Adjusted 4. Patient will return to all transfers, work activities, and functional activities without increased symptoms or restriction. [x]? Progressing: []? Met: []? Not Met: []? Adjusted       5. Patient will have 0/10 pain with ADL's. [x]? Progressing: []? Met: []? Not Met: []? Adjusted       6. Patient stated goal: Be able to garden this spring and summer without pain or restriction. [x]? Progressing: []? Met: []? Not Met: []? Adjusted        Overall Progression Towards Functional goals/ Treatment Progress Update:  [x] Patient is progressing as expected towards functional goals listed. [] Progression is slowed due to complexities/Impairments listed. [] Progression has been slowed due to co-morbidities.   [] Plan just implemented, too soon to assess goals progression <30days   [] Goals require adjustment due to lack of progress  [] Patient is not progressing as expected and requires additional follow up with physician  [] Other    Prognosis for POC: [x] Good [] Fair  [] Poor    Patient requires continued skilled intervention: [x] Yes  [] No    Treatment/Activity Tolerance:  [x] Patient able to complete treatment  [] Patient limited by fatigue  [] Patient limited by pain    [] Patient limited by other medical complications  [] Other:     Return to Play: (if applicable)   []  Stage 1: Intro to Strength   []  Stage 2: Return to Run and Strength   []  Stage 3: Return to Jump and Strength   []  Stage 4: Dynamic Strength and Agility   []  Stage 5: Sport Specific Training     []  Ready to Return to Play, Meets All Above Stages   []  Not Ready for Return to Sports   Comments:                         PLAN: See eval  [x] Continue per plan of care [] Alter current plan (see comments above)  [] Plan of care initiated [] Hold pending MD visit [] Discharge    Electronically signed by:  Altagracia Quinonez PTA    Note: If patient does not return for scheduled/ recommended follow up visits, this note will serve as a discharge from care along with most recent update on progress.

## 2022-03-23 DIAGNOSIS — N39.46 MIXED INCONTINENCE: ICD-10-CM

## 2022-03-23 NOTE — TELEPHONE ENCOUNTER
Future appt scheduled 05/20/2022              Last appt 11/23/2021      Last Written 01/25/2022    oxybutynin (DITROPAN) 5 MG tablet  #90  0 RF

## 2022-03-24 RX ORDER — OXYBUTYNIN CHLORIDE 5 MG/1
5 TABLET ORAL 3 TIMES DAILY
Qty: 90 TABLET | Refills: 1 | Status: SHIPPED | OUTPATIENT
Start: 2022-03-24 | End: 2022-08-25 | Stop reason: SDUPTHER

## 2022-03-31 ENCOUNTER — HOSPITAL ENCOUNTER (OUTPATIENT)
Dept: PHYSICAL THERAPY | Age: 77
Setting detail: THERAPIES SERIES
Discharge: HOME | End: 2022-03-31
Payer: MEDICARE

## 2022-03-31 PROCEDURE — 97140 MANUAL THERAPY 1/> REGIONS: CPT | Performed by: PHYSICAL THERAPY ASSISTANT

## 2022-03-31 PROCEDURE — 97112 NEUROMUSCULAR REEDUCATION: CPT | Performed by: PHYSICAL THERAPY ASSISTANT

## 2022-03-31 PROCEDURE — 97110 THERAPEUTIC EXERCISES: CPT | Performed by: PHYSICAL THERAPY ASSISTANT

## 2022-03-31 NOTE — FLOWSHEET NOTE
[facility], [address] [place] [address] Juanito Luz [address], [address]  Phone: [phone], [phone]      Physical Therapy Treatment Note/ Progress Report:     Date:  3/31/2022    Patient Name:  Maxine Calixto    :  1945  MRN: [id number]  Restrictions/Precautions:    Medical/Treatment Diagnosis Information:  · Diagnosis: Degnerative Disc Disease of Lumbar Spine  · Treatment Diagnosis: S68.33  Insurance/Certification information:  PT Insurance Information: [employer]  Physician Information:  Referring Practitioner: IFTIKHAR Singh CNP  Has the plan of care been signed (Y/N):        []  Yes  [x]  No     Date of Patient follow up with Physician: In may, not sure on specific date    Is this a Progress Report:     []  Yes  [x]  No      If Yes:  Date Range for reporting period:  Initial Eval: 2022  Beginnin2022 --- Ending: 3/10/2022  Beginning: 3/7/2022 --- Endin2022        Progress report will be due (10 Rx or 30 days whichever is less):      Recertification will be due (POC Duration  / 90 days whichever is less): 2022     Visit # Insurance Allowable Auth Required   In Person [id number] [facility] []  Yes     []  No    Tele Health   []  Yes     []  No    Total 10       Functional Scale: Oswestry 40% (Score 20/50)   Date assessed: 2022  Functional Scale: Oswestry: 14% (Score 7/50)   Date assessed: 3/7/2022      Latex Allergy:  [x]NO      []YES  Preferred Language for Healthcare:   [x]English       []other:    Pain level: currently 4/10;      SUBJECTIVE: Has been working on HEP and is feeling like she is getting a little stronger. She feels she is in a good place to continue with her HEP. She has been focusing on postures and body mechanics. She intermittently gets frustrated b/c she still has pain but she does understand that this is something that may never completely abolish.     OBJECTIVE: See eval   Observation:    Test measurements: RESTRICTIONS/PRECAUTIONS: HTN, Diabetes, OA, Anxiety/Depression, Afib    Exercises/Interventions:   Therapeutic Ex (04402)  Therapeutic Activity (80601)  NMR re-education (97574) Sets/Reps Notes/CUES   Bike        HR / slant board  X30  3x30\"   Standing Marches x30    Standing HS Curl  2x15    Standing Hip ABD  2x15        Mini Squats  x20    FSU         LAQ / SAQ   2x10 R, L x20 5\" hold    TA contraction  20 x 5\"    PPT 20x     LTR 20x ea    BKFO 20x ea    SLR  2x10 ea R, L    Supine Alt Marches  20x ea w/ TA contraction   Bridges  X10 5\" hold    Clamshells x20    Single knee to chest 5\" x10 ea          Piriformis stretch 3x30\" R, L     Supine HS Stretch  3x30\" R, L               Gave low back posture sheet and reviewed body mechanics  10'         T-Band Row / extension  Red band x20 ea                    Manual Intervention (10430)     Manual PROM lumbar spine- flx/ext 5'    STM to lumbar paraspinals  5'    Manual lumbar traction  5'                        [address] [place] [address] access code: [email or web address]    Access Code: [email or web address]  Access Code: [email or web address]  URL: [email or web address]  Date: 02/08/2022  Prepared by: Arabella Rojas    Exercises  Supine Posterior Pelvic Tilt - 2 x daily - 7 x weekly - 2 sets - 10 reps - 5 s hold  Supine March with Alternating Leg Lifts - 2 x daily - 7 x weekly - 3 sets - 10 reps  Hooklying Lumbar Rotation - 2 x daily - 7 x weekly - 3 sets - 10 reps  Supine Transversus Abdominis Bracing - Hands on Stomach - 2 x daily - 7 x weekly - 3 sets - 10 reps - 5 s hold  Bent Knee Fallouts - 2 x daily - 7 x weekly - 3 sets - 10 reps  Straight Leg Raise - 2 x daily - 7 x weekly - 3 sets - 10 reps             Patient Education 10'  Pt education with HEP and progression of PT along with compliance with HEP to aide with formal PT for optimal outcomes.         Therapeutic Exercise and NMR EXR  [x] (40049) Provided verbal/tactile cueing for activities related to strengthening, flexibility, endurance, ROM for improvements in LE, proximal hip, and core control with self care, mobility, lifting, ambulation. [x] (06991) Provided verbal/tactile cueing for activities related to improving balance, coordination, kinesthetic sense, posture, motor skill, proprioception to assist with LE, proximal hip, and core control in self-care, mobility, lifting, ambulation and eccentric single leg control. NMR and Therapeutic Activities:    [x] (33386 or 39147) Provided verbal/tactile cueing for activities related to improving balance, coordination, kinesthetic sense, posture, motor skill, proprioception and motor activation to allow for proper function of core, proximal hip and LE with self-care and ADLs and functional mobility. [x] (83400) Gait Re-education- Provided training and instruction to the patient for proper LE, core and proximal hip recruitment and positioning and eccentric body weight control with ambulation re-education including up and down stairs     Home Exercise Program:    [x] (45310) Reviewed/Progressed HEP activities related to strengthening, flexibility, endurance, ROM of core, proximal hip and LE for functional self-care, mobility, lifting and ambulation/stair navigation   [x] (65920) Reviewed/Progressed HEP activities related to improving balance, coordination, kinesthetic sense, posture, motor skill, proprioception of core, proximal hip and LE for self-care, mobility, lifting, and ambulation/stair navigation      Manual Treatments:  PROM / STM / Oscillations-Mobs:  G-I, II, III, IV (PA's, Inf., Post.)  [x] (53615) Provided manual therapy to mobilize LE, proximal hip and/or LS spine soft tissue/joints for the purpose of modulating pain, promoting relaxation, increasing ROM, reducing/eliminating soft tissue swelling/inflammation/restriction, improving soft tissue extensibility and allowing for proper ROM for normal function with self-care, mobility, lifting and ambulation.      Modalities:   P 10'    Charges:  Timed Code Treatment Minutes: 54'   Total Treatment Minutes:  72'   [facility]:  [facility] TIME:  MANUAL TIME:  UNTIMED MINUTES:  Medicare Total:           [] EVAL (LOW) 79562 (typically 20 minutes face-to-face)   [] EVAL (MOD) 37973 (typically 30 minutes face-to-face)  [] EVAL (HIGH) 02670 (typically 45 minutes face-to-face)  [] RE-EVAL      [x] WL(94370) x 2     [] IONTO  [x] NMR (79544) x 1     [] VASO  [x] Manual (92922) x 1     [] Other:  [] TA x       [] Mech Traction (45348)  [] ES(attended) (63248)      [] ES (un) (72622):    ASSESSMENT:  See eval    GOALS:     Short Term Goals: To be achieved in: 2 weeks  1. Independent in HEP and progression per patient tolerance, in order to prevent re-injury. []? Progressing: [x]? Met: []? Not Met: []? Adjusted       2. Patient will have a decrease in pain to facilitate improvement in movement, function, and ADLs as indicated by Functional Deficits. []? Progressing: [x]? Met: []? Not Met: []? Adjusted          Long Term Goals: To be achieved in: 12 weeks  1. Disability index score of 20% or less for the Oswestry to assist with reaching prior level of function. []? Progressing: [x]? Met: []? Not Met: []? Adjusted      2. Patient will demonstrate increased lumbar spine AROM to allow for ability to perform all functional activities without pain or restriction as indicated by patients Functional Deficits. []? Progressing: []? Met: [x]? Not Met: []? Adjusted       3. Patient will demonstrate an increase in strength of LE to 4+/5 to allow for ability to perform high demanding household chores without pain or restircion as indicated by patients Functional Deficits. []? Progressing: [x]? Met: []? Not Met: []? Adjusted       4. Patient will return to all transfers, work activities, and functional activities without increased symptoms or restriction. []? Progressing: []? Met: [x]? Not Met: []? Adjusted       5.  Patient will have 0/10 pain with ADL's.  []? Progressing: []? Met: [x]? Not Met: []? Adjusted       6. Patient stated goal: Be able to garden this spring and summer without pain or restriction. []? Progressing: []? Met: [x]? Not Met: []? Adjusted        Overall Progression Towards Functional goals/ Treatment Progress Update:  [x] Patient is progressing as expected towards functional goals listed. [] Progression is slowed due to complexities/Impairments listed. [] Progression has been slowed due to co-morbidities. [] Plan just implemented, too soon to assess goals progression <30days   [] Goals require adjustment due to lack of progress  [] Patient is not progressing as expected and requires additional follow up with physician  [] Other    Prognosis for POC: [x] Good [] Fair  [] Poor    Patient requires continued skilled intervention: [x] Yes  [] No    Treatment/Activity Tolerance:  [x] Patient able to complete treatment  [] Patient limited by fatigue  [] Patient limited by pain    [] Patient limited by other medical complications  [] Other:     Return to Play: (if applicable)   []  Stage 1: Intro to Strength   []  Stage 2: Return to Run and Strength   []  Stage 3: Return to Jump and Strength   []  Stage 4: Dynamic Strength and Agility   []  Stage 5: Sport Specific Training     []  Ready to Return to Play, Meets All Above Stages   []  Not Ready for Return to Sports   Comments:                         PLAN: See eval  [] Continue per plan of care [] Alter current plan (see comments above)  [] Plan of care initiated [] Hold pending MD visit [x] Discharge    Electronically signed by:  Julissa Arshad PTA    Note: If patient does not return for scheduled/ recommended follow up visits, this note will serve as a discharge from care along with most recent update on progress.

## 2022-04-12 DIAGNOSIS — K21.9 GASTROESOPHAGEAL REFLUX DISEASE WITHOUT ESOPHAGITIS: ICD-10-CM

## 2022-04-12 DIAGNOSIS — E78.00 PURE HYPERCHOLESTEROLEMIA: ICD-10-CM

## 2022-04-12 RX ORDER — ATORVASTATIN CALCIUM 40 MG/1
40 TABLET, FILM COATED ORAL DAILY
Qty: 90 TABLET | Refills: 3 | Status: SHIPPED | OUTPATIENT
Start: 2022-04-12

## 2022-04-12 RX ORDER — OMEPRAZOLE 40 MG/1
40 CAPSULE, DELAYED RELEASE ORAL DAILY
Qty: 90 CAPSULE | Refills: 3 | Status: SHIPPED | OUTPATIENT
Start: 2022-04-12

## 2022-05-17 ENCOUNTER — OFFICE VISIT (OUTPATIENT)
Dept: FAMILY MEDICINE CLINIC | Age: 77
End: 2022-05-17
Payer: MEDICARE

## 2022-05-17 VITALS
DIASTOLIC BLOOD PRESSURE: 62 MMHG | WEIGHT: 142 LBS | SYSTOLIC BLOOD PRESSURE: 118 MMHG | TEMPERATURE: 97 F | HEART RATE: 104 BPM | OXYGEN SATURATION: 98 % | BODY MASS INDEX: 20.37 KG/M2

## 2022-05-17 DIAGNOSIS — I10 HYPERTENSION, UNSPECIFIED TYPE: ICD-10-CM

## 2022-05-17 DIAGNOSIS — M51.36 DEGENERATIVE DISC DISEASE, LUMBAR: Primary | ICD-10-CM

## 2022-05-17 DIAGNOSIS — K21.9 GASTROESOPHAGEAL REFLUX DISEASE WITHOUT ESOPHAGITIS: ICD-10-CM

## 2022-05-17 DIAGNOSIS — I48.0 PAROXYSMAL ATRIAL FIBRILLATION (HCC): ICD-10-CM

## 2022-05-17 DIAGNOSIS — N18.31 STAGE 3A CHRONIC KIDNEY DISEASE (HCC): ICD-10-CM

## 2022-05-17 DIAGNOSIS — E11.9 TYPE 2 DIABETES MELLITUS WITHOUT COMPLICATION, WITHOUT LONG-TERM CURRENT USE OF INSULIN (HCC): ICD-10-CM

## 2022-05-17 PROBLEM — N18.30 CHRONIC RENAL DISEASE, STAGE III (HCC): Status: ACTIVE | Noted: 2022-05-17

## 2022-05-17 PROCEDURE — 3044F HG A1C LEVEL LT 7.0%: CPT | Performed by: NURSE PRACTITIONER

## 2022-05-17 PROCEDURE — 99214 OFFICE O/P EST MOD 30 MIN: CPT | Performed by: NURSE PRACTITIONER

## 2022-05-17 PROCEDURE — 93000 ELECTROCARDIOGRAM COMPLETE: CPT | Performed by: NURSE PRACTITIONER

## 2022-05-17 PROCEDURE — 1123F ACP DISCUSS/DSCN MKR DOCD: CPT | Performed by: NURSE PRACTITIONER

## 2022-05-17 PROCEDURE — 36415 COLL VENOUS BLD VENIPUNCTURE: CPT | Performed by: NURSE PRACTITIONER

## 2022-05-17 RX ORDER — METHOCARBAMOL 500 MG/1
500 TABLET, FILM COATED ORAL 4 TIMES DAILY
Qty: 40 TABLET | Refills: 0 | Status: SHIPPED | OUTPATIENT
Start: 2022-05-17 | End: 2022-05-26 | Stop reason: SDUPTHER

## 2022-05-17 RX ORDER — PROPAFENONE HYDROCHLORIDE 150 MG/1
150 TABLET, FILM COATED ORAL EVERY 8 HOURS
COMMUNITY

## 2022-05-17 RX ORDER — GABAPENTIN 100 MG/1
300 CAPSULE ORAL DAILY
Qty: 90 CAPSULE | Refills: 11 | Status: SHIPPED | OUTPATIENT
Start: 2022-05-17 | End: 2022-06-16

## 2022-05-17 SDOH — ECONOMIC STABILITY: FOOD INSECURITY: WITHIN THE PAST 12 MONTHS, YOU WORRIED THAT YOUR FOOD WOULD RUN OUT BEFORE YOU GOT MONEY TO BUY MORE.: NEVER TRUE

## 2022-05-17 SDOH — ECONOMIC STABILITY: FOOD INSECURITY: WITHIN THE PAST 12 MONTHS, THE FOOD YOU BOUGHT JUST DIDN'T LAST AND YOU DIDN'T HAVE MONEY TO GET MORE.: NEVER TRUE

## 2022-05-17 ASSESSMENT — PATIENT HEALTH QUESTIONNAIRE - PHQ9
6. FEELING BAD ABOUT YOURSELF - OR THAT YOU ARE A FAILURE OR HAVE LET YOURSELF OR YOUR FAMILY DOWN: 0
SUM OF ALL RESPONSES TO PHQ9 QUESTIONS 1 & 2: 1
7. TROUBLE CONCENTRATING ON THINGS, SUCH AS READING THE NEWSPAPER OR WATCHING TELEVISION: 0
1. LITTLE INTEREST OR PLEASURE IN DOING THINGS: 0
SUM OF ALL RESPONSES TO PHQ QUESTIONS 1-9: 3
8. MOVING OR SPEAKING SO SLOWLY THAT OTHER PEOPLE COULD HAVE NOTICED. OR THE OPPOSITE, BEING SO FIGETY OR RESTLESS THAT YOU HAVE BEEN MOVING AROUND A LOT MORE THAN USUAL: 0
9. THOUGHTS THAT YOU WOULD BE BETTER OFF DEAD, OR OF HURTING YOURSELF: 0
SUM OF ALL RESPONSES TO PHQ QUESTIONS 1-9: 3
SUM OF ALL RESPONSES TO PHQ QUESTIONS 1-9: 3
10. IF YOU CHECKED OFF ANY PROBLEMS, HOW DIFFICULT HAVE THESE PROBLEMS MADE IT FOR YOU TO DO YOUR WORK, TAKE CARE OF THINGS AT HOME, OR GET ALONG WITH OTHER PEOPLE: 1
SUM OF ALL RESPONSES TO PHQ QUESTIONS 1-9: 3
5. POOR APPETITE OR OVEREATING: 1
2. FEELING DOWN, DEPRESSED OR HOPELESS: 1
3. TROUBLE FALLING OR STAYING ASLEEP: 1

## 2022-05-17 ASSESSMENT — ENCOUNTER SYMPTOMS
CONSTIPATION: 0
BLOOD IN STOOL: 0
COUGH: 0
DIARRHEA: 0
SHORTNESS OF BREATH: 0
CHEST TIGHTNESS: 0

## 2022-05-17 ASSESSMENT — SOCIAL DETERMINANTS OF HEALTH (SDOH): HOW HARD IS IT FOR YOU TO PAY FOR THE VERY BASICS LIKE FOOD, HOUSING, MEDICAL CARE, AND HEATING?: SOMEWHAT HARD

## 2022-05-17 NOTE — PROGRESS NOTES
Blood drawn per order. Needle size: 23 g butterfly  Site: L Antecubital.  First attempt successful Yes    Second attempt na    Pressure applied until bleeding stopped. Cotton ball and band aid  applied. Patient informed to call office or return if bleeding reoccurs and unable to stop.     Tubes drawn: 1 purple     2 red      1 white tube with urine

## 2022-05-17 NOTE — PATIENT INSTRUCTIONS
Chiropractor List:     Sofya [address]   [address] [facility], [address] [facility] [phone]    MtCiro Gonzales  [address] [place] [address], [facility]

## 2022-05-17 NOTE — PROGRESS NOTES
5/17/2022    Chief Complaint   Patient presents with    Anxiety    Gastroesophageal Reflux    Hyperlipidemia     fasting     Back Pain       Davonte Collier is a 78 y.o. female, presents today for:      ASSESSMENT/PLAN:    1. Degenerative disc disease, lumbar  Stable today  Continue Neurontin, muscle relaxer. Encouraged HEP  No prior chiropractor/ PT eval  Last imaging: Lumbar XR 1/2022 showing multilevel degernative changes without listhesis. - gabapentin (NEURONTIN) 100 MG capsule; Take 3 capsules by mouth daily for 30 days. Dispense: 90 capsule; Refill: 11    2. Type 2 diabetes mellitus without complication, without long-term current use of insulin (HCC)  Stable, last A1C 6.0% 11/2021  Diet controlled  Encouraged Annual eye exam  Atorvastatin statin/ No ACE ARB  Encouraged low carbohydrate, heart healthy, low fat diet  Vaccinations not UTD, due for COVID booster  - Lipid Panel  - Hemoglobin A1C  - Microalbumin / Creatinine Urine Ratio    3. Hypertension, unspecified type  Stable today  Continue Metoprolol  Encouraged heart healthy diet  Encouraged 30 min activity daily  - Lipid Panel    4. Paroxysmal atrial fibrillation (HCC)  Stable today  Continue Metoprolol/ Rhythmol  Continue Eliquis  ECG showing Afib with LAD, similar to prior.   - EKG 12 Lead  - CBC with Auto Differential  - Comprehensive Metabolic Panel  - TSH with Reflex    5. Stage 3a chronic kidney disease ([facility] [address]  Encouraged water intake    6. Gastroesophageal reflux disease without esophagitis  Stable today  Continue Omeprazole  Encouraged avoidance of heartburn inducing foods. Return in about 6 months (around 11/17/2022). Presenting today for chronic disease follow up. Chronic Back Pain >10 years located upper lumbar spine across her entire back. 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   Describes pain as throbbing 10/10 worse with activity and prolonged periods of rest.  No saddle/ leg parasthesias. No constipation/ diarrhea. Previously had Lumbar Xrays and possibly MRIs but those were >5 years. Previously on narcotics and muscle relaxers by a PCP, however this was stopped when stricter narcotic control were put in place. Now taking Gabapentin with mild relief. Requesting muscle relaxer today. Did not make appt with Pain Mgmt. No prior KIMMY, chiropractic or PT care. DM: Not checking BS. No hypoglycemic symptoms. No foot/ eye complaints. Annual Eye exam not UTD. Diet controlled. Paroxysmal Afib/ HTN/ HLD: No CP/ SOB, leg swelling. No leg myalgias, sensory loss. Urinary Incontinence: Well controlled with Ditropan. Did not see urology as discussed with last PCP. Does feel it dehydrates her. Financially strapped. Has to pay $40 for every PT visit. Unable to see Pain Specialist or obtain imaging. Son coming over to help with mowing due to severe back pain    PHQ Scores 5/17/2022 11/23/2021 9/20/2021 7/7/2020 4/1/2019 10/2/2017 6/14/2016   PHQ2 Score 1 2 1 0 0 0 0   PHQ9 Score 3 2 1 0 0 0 0     Interpretation of Total Score Depression Severity: 1-4 = Minimal depression, 5-9 = Mild depression, 10-14 = Moderate depression, 15-19 = Moderately severe depression, 20-27 = Severe depression    Lab Results   Component Value Date     05/17/2022    K 4.6 05/17/2022     05/17/2022    CO2 24 05/17/2022    BUN 19 05/17/2022    CREATININE 1.1 05/17/2022    GLUCOSE 103 (H) 05/17/2022    CALCIUM 9.8 05/17/2022    PROT 6.6 05/17/2022    LABALBU 3.9 05/17/2022    BILITOT 0.6 05/17/2022    ALKPHOS 149 (H) 05/17/2022    AST 19 05/17/2022    ALT 13 05/17/2022    LABGLOM 48 (A) 05/17/2022    GFRAA 59 (A) 05/17/2022    AGRATIO 1.4 05/17/2022    GLOB 2.3 07/16/2020         Review of Systems   Constitutional: Negative. Respiratory: Negative for cough, chest tightness and shortness of breath. Cardiovascular: Positive for palpitations (occasional). Negative for chest pain and leg swelling. Gastrointestinal: Negative for blood in stool, constipation and diarrhea. Skin: Negative. Neurological: Negative for dizziness, tremors, light-headedness and headaches. Psychiatric/Behavioral: Negative for decreased concentration, dysphoric mood, self-injury, sleep disturbance and suicidal ideas. The patient is not nervous/anxious. Current Outpatient Medications on File Prior to Visit   Medication Sig Dispense Refill    propafenone (RYTHMOL) 150 MG tablet Take 150 mg by mouth every 8 hours      atorvastatin (LIPITOR) 40 MG tablet Take 1 tablet by mouth daily 90 tablet 3    omeprazole (PRILOSEC) 40 MG delayed release capsule Take 1 capsule by mouth daily 90 capsule 3    oxybutynin (DITROPAN) 5 MG tablet Take 1 tablet by mouth 3 times daily 90 tablet 1    FLUoxetine (PROZAC) 40 MG capsule Take 1 capsule by mouth daily 90 capsule 3    ELIQUIS 5 MG TABS tablet Take 1 tablet by mouth 2 times daily (Patient taking differently: Take 5 mg by mouth 2 times daily ) 60 tablet 5    metoprolol (TOPROL-XL) 50 MG XL tablet Take 50 mg by mouth daily        No current facility-administered medications on file prior to visit.      Allergies   Allergen Reactions    Naproxen Other (See Comments)     Oral ulcers      Lisinopril Other (See Comments)     Cough       Past Medical History:   Diagnosis Date    Anemia     Arthritis     Atrial fibrillation ([address]     Back pain     Diabetes mellitus ([address]     diet controlled, does not take medication    Hyperlipidemia     Hypertension     Total hip replacement 8/10/2011     Past Surgical History:   Procedure Laterality Date    CARDIOVERSION      CATARACT REMOVAL      CHOLECYSTECTOMY      COLONOSCOPY  2007    normal per pt    COLONOSCOPY  08/16/2017    colon polyps    DENTAL SURGERY      full mouth extractions    JOINT REPLACEMENT  8-    Right total hip replacement, anterior approach     NASAL SEPTUM SURGERY  SINUS ENDOSCOPY      TONSILLECTOMY      TUBAL LIGATION       Social History     Tobacco Use    Smoking status: Former Smoker     Packs/day: 0.50     Years: 44.00     Pack years: 22.00     Types: Cigarettes     Start date:      Quit date:      Years since quittin.4    Smokeless tobacco: Never Used   Substance Use Topics    Alcohol use: No     Alcohol/week: 0.0 standard drinks     Family History   Problem Relation Age of Onset    Cancer Mother         breast    Cancer Father         colon       Vitals:    22 1331   BP: 118/62   Pulse: 104   Temp: 97 °F (36.1 °C)   TempSrc: Oral   SpO2: 98%   Weight: 142 lb (64.4 kg)     Estimated body mass index is 20.37 kg/m² as calculated from the following:    Height as of 3/9/22: 5' 10\" (1.778 m). Weight as of this encounter: 142 lb (64.4 kg). Physical Exam  Vitals reviewed. Constitutional:       Appearance: Normal appearance. HENT:      Head: Normocephalic. Neck:      Vascular: No carotid bruit. Cardiovascular:      Rate and Rhythm: Normal rate and regular rhythm. Pulses: Normal pulses. Carotid pulses are 2+ on the right side and 2+ on the left side. Dorsalis pedis pulses are 2+ on the right side and 2+ on the left side. Posterior tibial pulses are 2+ on the right side and 2+ on the left side. Heart sounds: Normal heart sounds. No murmur heard. No gallop. Pulmonary:      Effort: Pulmonary effort is normal.      Breath sounds: Normal breath sounds. Abdominal:      General: Abdomen is flat. Palpations: Abdomen is soft. Musculoskeletal:         General: Normal range of motion. Cervical back: Normal range of motion. Lumbar back: Tenderness (lumbar paraspinals) present. No swelling, edema, deformity, signs of trauma, lacerations, spasms or bony tenderness. Normal range of motion. Negative right straight leg raise test and negative left straight leg raise test. No scoliosis.       Right lower leg: No edema. Left lower leg: No edema. Lymphadenopathy:      Cervical: No cervical adenopathy. Skin:     General: Skin is warm and dry. Capillary Refill: Capillary refill takes less than 2 seconds. Neurological:      General: No focal deficit present. Mental Status: She is alert and oriented to person, place, and time. Psychiatric:         Mood and Affect: Mood normal.         Behavior: Behavior normal.           Patient's questions answered and concerns addressed. Patient agrees to plan of care.         Electronically signed by IFTIKHAR Garcia CNP on 6/3/2022 at 8:58 AM

## 2022-05-17 NOTE — RESULT ENCOUNTER NOTE
Rate controlled Afib with Left axis deviation, similar to prior ECG from 2/24/2020 at [facility].  Reviewed with patient during visit.

## 2022-05-18 LAB
A/G RATIO: 1.4 (ref 1.1–2.2)
ALBUMIN SERPL-MCNC: 3.9 G/DL (ref 3.4–5)
ALP BLD-CCNC: 149 U/L (ref 40–129)
ALT SERPL-CCNC: 13 U/L (ref 10–40)
ANION GAP SERPL CALCULATED.3IONS-SCNC: 15 MMOL/L (ref 3–16)
AST SERPL-CCNC: 19 U/L (ref 15–37)
BASOPHILS ABSOLUTE: 0 K/UL (ref 0–0.2)
BASOPHILS RELATIVE PERCENT: 0.4 %
BILIRUB SERPL-MCNC: 0.6 MG/DL (ref 0–1)
BUN BLDV-MCNC: 19 MG/DL (ref 7–20)
CALCIUM SERPL-MCNC: 9.8 MG/DL (ref 8.3–10.6)
CHLORIDE BLD-SCNC: 101 MMOL/L (ref 99–110)
CHOLESTEROL, TOTAL: 172 MG/DL (ref 0–199)
CO2: 24 MMOL/L (ref 21–32)
CREAT SERPL-MCNC: 1.1 MG/DL (ref 0.6–1.2)
CREATININE URINE: 144.4 MG/DL (ref 28–259)
EOSINOPHILS ABSOLUTE: 0.1 K/UL (ref 0–0.6)
EOSINOPHILS RELATIVE PERCENT: 1.2 %
ESTIMATED AVERAGE GLUCOSE: 134.1 MG/DL
GFR AFRICAN AMERICAN: 59
GFR NON-AFRICAN AMERICAN: 48
GLUCOSE BLD-MCNC: 103 MG/DL (ref 70–99)
HBA1C MFR BLD: 6.3 %
HCT VFR BLD CALC: 33.6 % (ref 36–48)
HDLC SERPL-MCNC: 51 MG/DL (ref 40–60)
HEMOGLOBIN: 10.7 G/DL (ref 12–16)
LDL CHOLESTEROL CALCULATED: 97 MG/DL
LYMPHOCYTES ABSOLUTE: 1.6 K/UL (ref 1–5.1)
LYMPHOCYTES RELATIVE PERCENT: 28.8 %
MCH RBC QN AUTO: 26.6 PG (ref 26–34)
MCHC RBC AUTO-ENTMCNC: 32 G/DL (ref 31–36)
MCV RBC AUTO: 83.3 FL (ref 80–100)
MICROALBUMIN UR-MCNC: 3.1 MG/DL
MICROALBUMIN/CREAT UR-RTO: 21.5 MG/G (ref 0–30)
MONOCYTES ABSOLUTE: 0.6 K/UL (ref 0–1.3)
MONOCYTES RELATIVE PERCENT: 10.9 %
NEUTROPHILS ABSOLUTE: 3.2 K/UL (ref 1.7–7.7)
NEUTROPHILS RELATIVE PERCENT: 58.7 %
PDW BLD-RTO: 15.3 % (ref 12.4–15.4)
PLATELET # BLD: 234 K/UL (ref 135–450)
PMV BLD AUTO: 7.9 FL (ref 5–10.5)
POTASSIUM SERPL-SCNC: 4.6 MMOL/L (ref 3.5–5.1)
RBC # BLD: 4.03 M/UL (ref 4–5.2)
SODIUM BLD-SCNC: 140 MMOL/L (ref 136–145)
TOTAL PROTEIN: 6.6 G/DL (ref 6.4–8.2)
TRIGL SERPL-MCNC: 122 MG/DL (ref 0–150)
TSH REFLEX: 1.6 UIU/ML (ref 0.27–4.2)
VLDLC SERPL CALC-MCNC: 24 MG/DL
WBC # BLD: 5.5 K/UL (ref 4–11)

## 2022-05-21 NOTE — RESULT ENCOUNTER NOTE
Mild amount of protein in the urine but similar to prior. Meaning kidneys are not being affected by diabetes and hypertension. Diabetes control slightly worse from prior with A1C of 6.3. No need to add medications. One of your liver enzymes is slightly elevated- we will watch this. Cholesterol is normal.  Thyroid level is normal.  Mildly anemic which is new- we will watch this. Please call if you have additional questions and/ or concerns. Please keep your next appt. Thank you.

## 2022-05-25 ENCOUNTER — PATIENT MESSAGE (OUTPATIENT)
Dept: FAMILY MEDICINE CLINIC | Age: 77
End: 2022-05-25

## 2022-05-25 DIAGNOSIS — M51.36 DEGENERATIVE DISC DISEASE, LUMBAR: ICD-10-CM

## 2022-05-25 NOTE — TELEPHONE ENCOUNTER
From: Layton Miller  To: Breana Sanchez  Sent: 5/25/2022 9:18 AM EDT  Subject: Muscle relaxer    Donte Collier Flatter,  The medication is working but is not covered by insurance because of patients falling issues for this ingredient. We will need to try a different medication. I have enough for a few days. The new trial medication will need to be ordered on Friday as my pharmacy is closed on Saturday. Thank you kindly.

## 2022-05-26 RX ORDER — METHOCARBAMOL 500 MG/1
500 TABLET, FILM COATED ORAL 4 TIMES DAILY
Qty: 120 TABLET | Refills: 0 | Status: SHIPPED | OUTPATIENT
Start: 2022-05-26 | End: 2022-06-25

## 2022-07-05 ENCOUNTER — PATIENT MESSAGE (OUTPATIENT)
Dept: FAMILY MEDICINE CLINIC | Age: 77
End: 2022-07-05

## 2022-07-05 DIAGNOSIS — M51.36 DEGENERATIVE DISC DISEASE, LUMBAR: Primary | ICD-10-CM

## 2022-07-05 NOTE — TELEPHONE ENCOUNTER
From: Hyun Quiroz  To: Madeleine Soria  Sent: 7/5/2022 9:55 AM EDT  Subject: Methocarbamol    Need a new order sent to pharmacy please. Thank you.

## 2022-07-06 RX ORDER — METHOCARBAMOL 500 MG/1
500 TABLET, FILM COATED ORAL 4 TIMES DAILY
Qty: 120 TABLET | Refills: 2 | Status: SHIPPED | OUTPATIENT
Start: 2022-07-06 | End: 2022-08-05

## 2022-08-25 DIAGNOSIS — N39.46 MIXED INCONTINENCE: ICD-10-CM

## 2022-08-25 RX ORDER — OXYBUTYNIN CHLORIDE 5 MG/1
5 TABLET ORAL 3 TIMES DAILY
Qty: 90 TABLET | Refills: 1 | Status: SHIPPED | OUTPATIENT
Start: 2022-08-25 | End: 2022-09-24

## 2022-10-04 DIAGNOSIS — F41.9 ANXIETY: ICD-10-CM

## 2022-10-04 RX ORDER — FLUOXETINE HYDROCHLORIDE 40 MG/1
40 CAPSULE ORAL DAILY
Qty: 90 CAPSULE | Refills: 0 | Status: SHIPPED | OUTPATIENT
Start: 2022-10-04

## 2022-11-15 ENCOUNTER — OFFICE VISIT (OUTPATIENT)
Dept: FAMILY MEDICINE CLINIC | Age: 77
End: 2022-11-15
Payer: MEDICARE

## 2022-11-15 VITALS
BODY MASS INDEX: 20.37 KG/M2 | HEART RATE: 82 BPM | SYSTOLIC BLOOD PRESSURE: 120 MMHG | TEMPERATURE: 97.6 F | OXYGEN SATURATION: 96 % | DIASTOLIC BLOOD PRESSURE: 68 MMHG | WEIGHT: 142 LBS

## 2022-11-15 DIAGNOSIS — Z23 NEED FOR INFLUENZA VACCINATION: ICD-10-CM

## 2022-11-15 DIAGNOSIS — N18.31 TYPE 2 DIABETES MELLITUS WITH STAGE 3A CHRONIC KIDNEY DISEASE, WITHOUT LONG-TERM CURRENT USE OF INSULIN (HCC): Primary | ICD-10-CM

## 2022-11-15 DIAGNOSIS — E11.22 TYPE 2 DIABETES MELLITUS WITH STAGE 3A CHRONIC KIDNEY DISEASE, WITHOUT LONG-TERM CURRENT USE OF INSULIN (HCC): Primary | ICD-10-CM

## 2022-11-15 DIAGNOSIS — I48.0 PAROXYSMAL ATRIAL FIBRILLATION (HCC): ICD-10-CM

## 2022-11-15 DIAGNOSIS — M51.36 DEGENERATIVE DISC DISEASE, LUMBAR: ICD-10-CM

## 2022-11-15 PROCEDURE — 99214 OFFICE O/P EST MOD 30 MIN: CPT | Performed by: NURSE PRACTITIONER

## 2022-11-15 PROCEDURE — G0008 ADMIN INFLUENZA VIRUS VAC: HCPCS | Performed by: NURSE PRACTITIONER

## 2022-11-15 PROCEDURE — 3078F DIAST BP <80 MM HG: CPT | Performed by: NURSE PRACTITIONER

## 2022-11-15 PROCEDURE — 90694 VACC AIIV4 NO PRSRV 0.5ML IM: CPT | Performed by: NURSE PRACTITIONER

## 2022-11-15 PROCEDURE — 36415 COLL VENOUS BLD VENIPUNCTURE: CPT | Performed by: NURSE PRACTITIONER

## 2022-11-15 PROCEDURE — 3044F HG A1C LEVEL LT 7.0%: CPT | Performed by: NURSE PRACTITIONER

## 2022-11-15 PROCEDURE — 3074F SYST BP LT 130 MM HG: CPT | Performed by: NURSE PRACTITIONER

## 2022-11-15 PROCEDURE — 1123F ACP DISCUSS/DSCN MKR DOCD: CPT | Performed by: NURSE PRACTITIONER

## 2022-11-15 RX ORDER — METHOCARBAMOL 500 MG/1
500 TABLET, FILM COATED ORAL 4 TIMES DAILY
COMMUNITY

## 2022-11-15 RX ORDER — CETIRIZINE HYDROCHLORIDE 10 MG/1
10 TABLET ORAL DAILY
COMMUNITY

## 2022-11-15 ASSESSMENT — PATIENT HEALTH QUESTIONNAIRE - PHQ9
SUM OF ALL RESPONSES TO PHQ QUESTIONS 1-9: 1
6. FEELING BAD ABOUT YOURSELF - OR THAT YOU ARE A FAILURE OR HAVE LET YOURSELF OR YOUR FAMILY DOWN: 0
7. TROUBLE CONCENTRATING ON THINGS, SUCH AS READING THE NEWSPAPER OR WATCHING TELEVISION: 0
9. THOUGHTS THAT YOU WOULD BE BETTER OFF DEAD, OR OF HURTING YOURSELF: 0
10. IF YOU CHECKED OFF ANY PROBLEMS, HOW DIFFICULT HAVE THESE PROBLEMS MADE IT FOR YOU TO DO YOUR WORK, TAKE CARE OF THINGS AT HOME, OR GET ALONG WITH OTHER PEOPLE: 0
2. FEELING DOWN, DEPRESSED OR HOPELESS: 0
SUM OF ALL RESPONSES TO PHQ9 QUESTIONS 1 & 2: 0
SUM OF ALL RESPONSES TO PHQ QUESTIONS 1-9: 1
SUM OF ALL RESPONSES TO PHQ QUESTIONS 1-9: 1
4. FEELING TIRED OR HAVING LITTLE ENERGY: 1
8. MOVING OR SPEAKING SO SLOWLY THAT OTHER PEOPLE COULD HAVE NOTICED. OR THE OPPOSITE, BEING SO FIGETY OR RESTLESS THAT YOU HAVE BEEN MOVING AROUND A LOT MORE THAN USUAL: 0
SUM OF ALL RESPONSES TO PHQ QUESTIONS 1-9: 1
5. POOR APPETITE OR OVEREATING: 0
3. TROUBLE FALLING OR STAYING ASLEEP: 0
1. LITTLE INTEREST OR PLEASURE IN DOING THINGS: 0

## 2022-11-15 ASSESSMENT — ENCOUNTER SYMPTOMS
SHORTNESS OF BREATH: 0
COUGH: 0
CONSTIPATION: 0
CHEST TIGHTNESS: 0
BLOOD IN STOOL: 0
DIARRHEA: 0

## 2022-11-15 NOTE — PROGRESS NOTES
11/15/2022    Chief Complaint   Patient presents with    Anxiety    Hyperlipidemia    Gastroesophageal Reflux    Incontinence     Urinary     Atrial Fibrillation       Des Cheng is a 78 y.o. female, presents today for:      ASSESSMENT/PLAN:  1. Type 2 diabetes mellitus with stage 3a chronic kidney disease, without long-term current use of insulin ([facility] [address]  Updated labs ordered today. Diet controlled  Encouraged Annual eye exam  On Atorvastatin/ No ACE ARB  Encouraged low carbohydrate, heart healthy, low fat diet  Vaccinations not UTD, due for COVID booster  - Comprehensive Metabolic Panel  - Hemoglobin A1C    2. Paroxysmal atrial fibrillation (HCC)  Stable today  Continue Metoprolol/ Rhythmol  Continue Eliquis  5/2022 ECG showing Afib with LAD, similar to prior  Encouraged to keep appt with Dr. Brenden Toussaint  Encouraged heart healthy diet  Encouraged 30 min activity daily  - CBC with Auto Differential    3. Degenerative disc disease, lumbar  Stable today  Continue Neurontin, muscle relaxer. Encouraged HEP  No prior chiropractor/ PT eval  Last imaging: Lumbar XR 1/2022 showing multilevel degernative changes without listhesis. 4. Need for influenza vaccination  Influenza vaccination given today. - Influenza, FLUAD, (age 72 y+), IM, Preservative Free, 0.5 mL    Return for awv, htn, dm, come fasting. Presenting today for chronic disease follow up. DM: Not checking BS. No hypoglycemic symptoms. No foot/ eye complaints. Annual Eye exam not UTD. Diet controlled. Paroxysmal Afib/ HTN/ HLD: No CP/ SOB, leg swelling. No leg myalgias, sensory loss. Urinary Incontinence: Well controlled with Ditropan. Did not see urology as discussed with last PCP. Does feel it dehydrates her. Chronic Back Pain Better controlled today with Gabapentin/ Methocarbamal.  Occurring >10 years located upper lumbar spine across her entire back.   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 Describes pain as throbbing 10/10 worse with activity and prolonged periods of rest.  No saddle/ leg parasthesias. No constipation/ diarrhea. Previously had Lumbar Xrays and possibly MRIs but those were >5 years. Previously on narcotics and muscle relaxers by a PCP, however this was stopped when stricter narcotic control were put in place. Now taking Gabapentin with mild relief. No prior KIMMY, chiropractic or PT care. Has to pay $40 for every PT visit. Unable to see Pain Specialist or obtain imaging due to cost.  Son coming over to help with mowing due to severe back pain    PHQ Scores 11/15/2022 5/17/2022 11/23/2021 9/20/2021 7/7/2020 4/1/2019 10/2/2017   PHQ2 Score 0 1 2 1 0 0 0   PHQ9 Score 1 3 2 1 0 0 0     Interpretation of Total Score Depression Severity: 1-4 = Minimal depression, 5-9 = Mild depression, 10-14 = Moderate depression, 15-19 = Moderately severe depression, 20-27 = Severe depression    Lab Results   Component Value Date     11/15/2022    K 4.7 11/15/2022     11/15/2022    CO2 25 11/15/2022    BUN 20 11/15/2022    CREATININE 1.1 11/15/2022    GLUCOSE 88 11/15/2022    CALCIUM 9.5 11/15/2022    PROT 6.2 (L) 11/15/2022    LABALBU 3.8 11/15/2022    BILITOT 0.6 11/15/2022    ALKPHOS 130 (H) 11/15/2022    AST 17 11/15/2022    ALT 15 11/15/2022    LABGLOM 52 (A) 11/15/2022    GFRAA 59 (A) 05/17/2022    AGRATIO 1.6 11/15/2022    GLOB 2.3 07/16/2020         Review of Systems   Constitutional: Negative. Respiratory:  Negative for cough, chest tightness and shortness of breath. Cardiovascular:  Positive for palpitations (occasional). Negative for chest pain and leg swelling. Gastrointestinal:  Negative for blood in stool, constipation and diarrhea. Skin: Negative. Neurological:  Negative for dizziness, tremors, light-headedness and headaches.    Psychiatric/Behavioral:  Negative for decreased concentration, dysphoric mood, self-injury, sleep disturbance and suicidal ideas. The patient is not nervous/anxious. Current Outpatient Medications on File Prior to Visit   Medication Sig Dispense Refill    methocarbamol (ROBAXIN) 500 MG tablet Take 500 mg by mouth 4 times daily      cetirizine (ZYRTEC) 10 MG tablet Take 10 mg by mouth daily      FLUoxetine (PROZAC) 40 MG capsule Take 1 capsule by mouth daily 90 capsule 0    oxybutynin (DITROPAN) 5 MG tablet Take 1 tablet by mouth 3 times daily 90 tablet 1    propafenone (RYTHMOL) 150 MG tablet Take 150 mg by mouth every 8 hours      gabapentin (NEURONTIN) 100 MG capsule Take 3 capsules by mouth daily for 30 days. 90 capsule 11    atorvastatin (LIPITOR) 40 MG tablet Take 1 tablet by mouth daily 90 tablet 3    omeprazole (PRILOSEC) 40 MG delayed release capsule Take 1 capsule by mouth daily 90 capsule 3    ELIQUIS 5 MG TABS tablet Take 1 tablet by mouth 2 times daily (Patient taking differently: Take 5 mg by mouth 2 times daily Indications: written by Luis Arriola) 60 tablet 5    metoprolol (TOPROL-XL) 50 MG XL tablet Take 50 mg by mouth daily        No current facility-administered medications on file prior to visit.      Allergies   Allergen Reactions    Naproxen Other (See Comments)     Oral ulcers      Lisinopril Other (See Comments)     Cough       Past Medical History:   Diagnosis Date    Anemia     Arthritis     Atrial fibrillation (HCC)     Back pain     Diabetes mellitus ([address]     diet controlled, does not take medication    Hyperlipidemia     Hypertension     Total hip replacement 8/10/2011     Past Surgical History:   Procedure Laterality Date    CARDIOVERSION      CATARACT REMOVAL      CHOLECYSTECTOMY      COLONOSCOPY  2007    normal per pt    COLONOSCOPY  08/16/2017    colon polyps    DENTAL SURGERY      full mouth extractions    JOINT REPLACEMENT  8-    Right total hip replacement, anterior approach     NASAL SEPTUM SURGERY      SINUS ENDOSCOPY      TONSILLECTOMY TUBAL LIGATION       Social History     Tobacco Use    Smoking status: Former     Packs/day: 0.50     Years: 44.00     Pack years: 22.00     Types: Cigarettes     Start date:      Quit date:      Years since quittin.8    Smokeless tobacco: Never   Substance Use Topics    Alcohol use: No     Alcohol/week: 0.0 standard drinks     Family History   Problem Relation Age of Onset    Cancer Mother         breast    Cancer Father         colon       Vitals:    11/15/22 1335   BP: 120/68   Pulse: 82   Temp: 97.6 °F (36.4 °C)   TempSrc: Infrared   SpO2: 96%   Weight: 142 lb (64.4 kg)     Estimated body mass index is 20.37 kg/m² as calculated from the following:    Height as of 3/9/22: 5' 10\" (1.778 m). Weight as of this encounter: 142 lb (64.4 kg). Physical Exam  Vitals reviewed. Constitutional:       Appearance: Normal appearance. HENT:      Head: Normocephalic. Neck:      Vascular: No carotid bruit. Cardiovascular:      Rate and Rhythm: Normal rate and regular rhythm. Pulses: Normal pulses. Carotid pulses are 2+ on the right side and 2+ on the left side. Dorsalis pedis pulses are 2+ on the right side and 2+ on the left side. Posterior tibial pulses are 2+ on the right side and 2+ on the left side. Heart sounds: Normal heart sounds. No murmur heard. No gallop. Pulmonary:      Effort: Pulmonary effort is normal.      Breath sounds: Normal breath sounds. Abdominal:      General: Abdomen is flat. Palpations: Abdomen is soft. Musculoskeletal:         General: Normal range of motion. Cervical back: Normal range of motion. Lumbar back: Tenderness (lumbar paraspinals) present. No swelling, edema, deformity, signs of trauma, lacerations, spasms or bony tenderness. Normal range of motion. Negative right straight leg raise test and negative left straight leg raise test. No scoliosis. Right lower leg: No edema. Left lower leg: No edema. Lymphadenopathy:      Cervical: No cervical adenopathy. Skin:     General: Skin is warm and dry. Capillary Refill: Capillary refill takes less than 2 seconds. Neurological:      General: No focal deficit present. Mental Status: She is alert and oriented to person, place, and time. Psychiatric:         Mood and Affect: Mood normal.         Behavior: Behavior normal.         Patient's questions answered and concerns addressed. Patient agrees to plan of care.         Electronically signed by IFTIKHAR Chong CNP on 11/18/2022 at 10:33 AM

## 2022-11-15 NOTE — PROGRESS NOTES
Blood drawn per order. Needle size: 23 g butterfly   Site: L Antecubital.  First attempt successful Yes    Second attempt na    Pressure applied until bleeding stopped. Cotton ball and band aid  applied. Patient informed to call office or return if bleeding reoccurs and unable to stop.     Tubes drawn: 1 purple     2 red    \

## 2022-11-16 LAB
A/G RATIO: 1.6 (ref 1.1–2.2)
ALBUMIN SERPL-MCNC: 3.8 G/DL (ref 3.4–5)
ALP BLD-CCNC: 130 U/L (ref 40–129)
ALT SERPL-CCNC: 15 U/L (ref 10–40)
ANION GAP SERPL CALCULATED.3IONS-SCNC: 13 MMOL/L (ref 3–16)
AST SERPL-CCNC: 17 U/L (ref 15–37)
BASOPHILS ABSOLUTE: 0.1 K/UL (ref 0–0.2)
BASOPHILS RELATIVE PERCENT: 0.8 %
BILIRUB SERPL-MCNC: 0.6 MG/DL (ref 0–1)
BUN BLDV-MCNC: 20 MG/DL (ref 7–20)
CALCIUM SERPL-MCNC: 9.5 MG/DL (ref 8.3–10.6)
CHLORIDE BLD-SCNC: 104 MMOL/L (ref 99–110)
CO2: 25 MMOL/L (ref 21–32)
CREAT SERPL-MCNC: 1.1 MG/DL (ref 0.6–1.2)
EOSINOPHILS ABSOLUTE: 0.1 K/UL (ref 0–0.6)
EOSINOPHILS RELATIVE PERCENT: 1.4 %
ESTIMATED AVERAGE GLUCOSE: 137 MG/DL
GFR SERPL CREATININE-BSD FRML MDRD: 52 ML/MIN/{1.73_M2}
GLUCOSE BLD-MCNC: 88 MG/DL (ref 70–99)
HBA1C MFR BLD: 6.4 %
HCT VFR BLD CALC: 32.9 % (ref 36–48)
HEMOGLOBIN: 10.3 G/DL (ref 12–16)
LYMPHOCYTES ABSOLUTE: 1.9 K/UL (ref 1–5.1)
LYMPHOCYTES RELATIVE PERCENT: 30.5 %
MCH RBC QN AUTO: 24.9 PG (ref 26–34)
MCHC RBC AUTO-ENTMCNC: 31.2 G/DL (ref 31–36)
MCV RBC AUTO: 79.9 FL (ref 80–100)
MONOCYTES ABSOLUTE: 0.5 K/UL (ref 0–1.3)
MONOCYTES RELATIVE PERCENT: 7.9 %
NEUTROPHILS ABSOLUTE: 3.7 K/UL (ref 1.7–7.7)
NEUTROPHILS RELATIVE PERCENT: 59.4 %
PDW BLD-RTO: 16.6 % (ref 12.4–15.4)
PLATELET # BLD: 264 K/UL (ref 135–450)
PMV BLD AUTO: 8.3 FL (ref 5–10.5)
POTASSIUM SERPL-SCNC: 4.7 MMOL/L (ref 3.5–5.1)
RBC # BLD: 4.12 M/UL (ref 4–5.2)
SODIUM BLD-SCNC: 142 MMOL/L (ref 136–145)
TOTAL PROTEIN: 6.2 G/DL (ref 6.4–8.2)
WBC # BLD: 6.3 K/UL (ref 4–11)

## 2022-11-17 NOTE — RESULT ENCOUNTER NOTE
Slightly anemic. Have you recently given blood or had any blood in your stool? Since you haven't had your colonoscopy in while we may want to think about getting that to make sure you are not having any bleeding. Liver function Is normal.  Mildly decreased kidney function similar to prior. Please make sure to drink lots of water. Diabetes continues to be well controlled with an A1C of 6.4%. Please call if you have additional questions and/ or concerns. Please keep your next appt. Thank you.

## 2022-11-25 ENCOUNTER — COMMUNITY OUTREACH (OUTPATIENT)
Dept: FAMILY MEDICINE CLINIC | Age: 77
End: 2022-11-25

## 2022-11-25 NOTE — PROGRESS NOTES
Patient's HM shows they are overdue for Mammogram and Colorectal Screening. [employer] and  files searched. No results to attach to order nor HM updated.

## 2023-01-09 DIAGNOSIS — F41.9 ANXIETY: ICD-10-CM

## 2023-01-09 RX ORDER — FLUOXETINE HYDROCHLORIDE 40 MG/1
40 CAPSULE ORAL DAILY
Qty: 90 CAPSULE | Refills: 1 | Status: SHIPPED | OUTPATIENT
Start: 2023-01-09

## 2023-01-09 NOTE — TELEPHONE ENCOUNTER
Future appt scheduled 05/16/2023                         Last appt 11/15/2022      Last Written 10/04/2022    FLUoxetine (PROZAC) 40 MG capsule  #90  0 RF

## 2023-02-24 ENCOUNTER — PATIENT MESSAGE (OUTPATIENT)
Dept: FAMILY MEDICINE CLINIC | Age: 78
End: 2023-02-24

## 2023-02-24 DIAGNOSIS — N39.46 MIXED INCONTINENCE: ICD-10-CM

## 2023-02-24 RX ORDER — OXYBUTYNIN CHLORIDE 5 MG/1
5 TABLET ORAL 3 TIMES DAILY
Qty: 90 TABLET | Refills: 1 | Status: SHIPPED | OUTPATIENT
Start: 2023-02-24 | End: 2023-03-26

## 2023-02-24 NOTE — TELEPHONE ENCOUNTER
From: Kiki Santos  To: Adrian Mari  Sent: 2/24/2023 12:49 PM EST  Subject: Referral    Corrinne Piggs,  I would like to see a pain doctor. My condition is continuing to worsen and is becoming unbearable. [facility]. They require a referral. Would you please send them one?    Thank you kindly,  Marnie Zurita

## 2023-02-24 NOTE — TELEPHONE ENCOUNTER
Maynor Feliz is requesting refill(s)   Last OV 11- (pertaining to medication)  LR 8- (per medication requested)  Next office visit scheduled or attempted Yes   If no, reason:  5-

## 2023-04-06 ENCOUNTER — PATIENT MESSAGE (OUTPATIENT)
Dept: FAMILY MEDICINE CLINIC | Age: 78
End: 2023-04-06

## 2023-04-06 DIAGNOSIS — E78.00 PURE HYPERCHOLESTEROLEMIA: ICD-10-CM

## 2023-04-06 DIAGNOSIS — K21.9 GASTROESOPHAGEAL REFLUX DISEASE WITHOUT ESOPHAGITIS: ICD-10-CM

## 2023-04-06 DIAGNOSIS — M51.36 DEGENERATIVE DISC DISEASE, LUMBAR: Primary | ICD-10-CM

## 2023-04-06 RX ORDER — METHOCARBAMOL 500 MG/1
500 TABLET, FILM COATED ORAL 4 TIMES DAILY
Qty: 360 TABLET | Refills: 3 | Status: SHIPPED | OUTPATIENT
Start: 2023-04-06 | End: 2023-07-05

## 2023-04-06 RX ORDER — ATORVASTATIN CALCIUM 40 MG/1
40 TABLET, FILM COATED ORAL DAILY
Qty: 90 TABLET | Refills: 3 | Status: SHIPPED | OUTPATIENT
Start: 2023-04-06

## 2023-04-06 RX ORDER — OMEPRAZOLE 40 MG/1
40 CAPSULE, DELAYED RELEASE ORAL DAILY
Qty: 90 CAPSULE | Refills: 3 | Status: SHIPPED | OUTPATIENT
Start: 2023-04-06

## 2023-04-06 NOTE — TELEPHONE ENCOUNTER
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  This has been going on for about 2 weeks and now she states that it is causing her pain.  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  Patient does states that she has a history of eczema and predominantly on the hands.  No other complaints at today's visit.      Other significant problems:  Patient Active Problem List    Diagnosis Date Noted   • Degeneration of lumbar or lumbosacral intervertebral disc 09/23/2015     Priority: Low   • Lumbar radiculopathy 09/23/2015     Priority: Low       PAST MEDICAL, FAMILY AND SOCIAL HISTORY     Medications:  Current Outpatient Medications   Medication   • albuterol 108 (90 Base) MCG/ACT inhaler   • simvastatin (ZOCOR) 40 MG tablet   • simvastatin (ZOCOR) 40 MG tablet   • levothyroxine 150 MCG tablet   • levothyroxine (SYNTHROID, LEVOTHROID) 150 MCG tablet   • traZODone (DESYREL) 50 MG tablet   • betamethasone dipropionate (DIPROSONE) 0.05 % cream   • zolpidem (AMBIEN) 10 MG tablet   • cefdinir (OMNICEF) 300 MG capsule   • zolpidem (AMBIEN) 10 MG tablet   • triamcinolone (KENALOG) 0.025 % cream   • benzonatate (TESSALON PERLES) 200 MG capsule   • amoxicillin-clavulanate (AUGMENTIN) 875-125 MG per tablet   • methylPREDNISolone (MEDROL DOSEPAK) 4 MG tablet   • levofloxacin (LEVAQUIN) 500 MG tablet   • promethazine-codeine (PHENERGAN WITH CODEINE) 6.25-10 MG/5ML syrup   • clotrimazole-betamethasone (LOTRISONE)  Future appt scheduled 05/16/2023                     Last appt 11/15/2022      Last Written 04/12/2022    omeprazole (PRILOSEC) 40 MG delayed release capsule  #90  3 RF 1-0.05 % cream   • metoCLOPramide (REGLAN) 10 MG tablet   • DULoxetine (CYMBALTA) 60 MG capsule   • propranolol (INDERAL) 20 MG tablet   • dexamethasone (DECADRON) 0.75 MG tablet   • sumatriptan (IMITREX) 100 MG tablet   • indomethacin (INDOCIN) 25 MG capsule   • tiZANidine (ZANAFLEX) 2 MG tablet   • propranolol (INDERAL) 40 MG tablet   • Levothyroxine Sodium (SYNTHROID PO)   • SIMVASTATIN PO   • ZOLPIDEM TARTRATE PO   • Hydrocodone-Acetaminophen (VICODIN PO)   • cyclobenzaprine (FLEXERIL) 10 MG tablet   • HYDROcodone-acetaminophen (NORCO) 5-325 MG per tablet     No current facility-administered medications for this visit.        Allergies:  ALLERGIES:   Allergen Reactions   • Morphine HIVES   • Sulfa Antibiotics HIVES       Past Medical  History/Surgeries:  History reviewed. No pertinent past medical history.    History reviewed. No pertinent surgical history.    Family History:  History reviewed. No pertinent family history.    Social History:  Social History     Tobacco Use   • Smoking status: Current Every Day Smoker     Packs/day: 1.00     Types: Cigarettes   • Smokeless tobacco: Never Used   Substance Use Topics   • Alcohol use: Not on file       REVIEW OF SYSTEMS     Review of Systems   Skin: Positive for wound.       PHYSICAL EXAM     Physical Exam  Vitals signs and nursing note reviewed.   Constitutional:       General: She is not in acute distress.     Appearance: Normal appearance. She is well-developed. She is not ill-appearing, toxic-appearing or diaphoretic.   HENT:      Head: Normocephalic and atraumatic.   Eyes:      Conjunctiva/sclera: Conjunctivae normal.   Neck:      Musculoskeletal: Normal range of motion.   Skin:     General: Skin is warm and dry.      Findings: Rash (Irritated, red, dry, cracked skin noted on the hands bilaterally mostly on the dorsal aspect.  Consistent with eczema.) present.   Neurological:      Mental Status: She is alert and oriented to person, place, and time.    Psychiatric:         Mood and Affect: Mood normal.         Behavior: Behavior normal.         Thought Content: Thought content normal.         ASSESSMENT/PLAN     Love was seen today for derm problem.    Diagnoses and all orders for this visit:    Hand eczema  -     triamcinolone (ARISTOCORT) 0.1 % ointment; Apply topically 2 times daily.  Home care instructions provided for review.     If symptoms worsen or fail to improve, patient should follow-up with their PCP.

## 2023-05-16 ENCOUNTER — PATIENT MESSAGE (OUTPATIENT)
Dept: FAMILY MEDICINE CLINIC | Age: 78
End: 2023-05-16

## 2023-05-16 ENCOUNTER — OFFICE VISIT (OUTPATIENT)
Dept: FAMILY MEDICINE CLINIC | Age: 78
End: 2023-05-16
Payer: MEDICARE

## 2023-05-16 VITALS
TEMPERATURE: 97.3 F | WEIGHT: 131 LBS | DIASTOLIC BLOOD PRESSURE: 68 MMHG | SYSTOLIC BLOOD PRESSURE: 122 MMHG | OXYGEN SATURATION: 97 % | BODY MASS INDEX: 18.8 KG/M2 | HEART RATE: 110 BPM

## 2023-05-16 DIAGNOSIS — N18.31 ANEMIA DUE TO STAGE 3A CHRONIC KIDNEY DISEASE (HCC): ICD-10-CM

## 2023-05-16 DIAGNOSIS — F41.9 ANXIETY: ICD-10-CM

## 2023-05-16 DIAGNOSIS — E11.22 TYPE 2 DIABETES MELLITUS WITH STAGE 3A CHRONIC KIDNEY DISEASE, WITHOUT LONG-TERM CURRENT USE OF INSULIN (HCC): ICD-10-CM

## 2023-05-16 DIAGNOSIS — N18.31 TYPE 2 DIABETES MELLITUS WITH STAGE 3A CHRONIC KIDNEY DISEASE, WITHOUT LONG-TERM CURRENT USE OF INSULIN (HCC): ICD-10-CM

## 2023-05-16 DIAGNOSIS — M51.36 DEGENERATIVE DISC DISEASE, LUMBAR: ICD-10-CM

## 2023-05-16 DIAGNOSIS — N39.46 MIXED INCONTINENCE: ICD-10-CM

## 2023-05-16 DIAGNOSIS — N18.31 STAGE 3A CHRONIC KIDNEY DISEASE (HCC): ICD-10-CM

## 2023-05-16 DIAGNOSIS — I10 PRIMARY HYPERTENSION: ICD-10-CM

## 2023-05-16 DIAGNOSIS — I48.0 PAROXYSMAL ATRIAL FIBRILLATION (HCC): ICD-10-CM

## 2023-05-16 DIAGNOSIS — Z00.00 MEDICARE ANNUAL WELLNESS VISIT, SUBSEQUENT: Primary | ICD-10-CM

## 2023-05-16 DIAGNOSIS — D63.1 ANEMIA DUE TO STAGE 3A CHRONIC KIDNEY DISEASE (HCC): ICD-10-CM

## 2023-05-16 PROBLEM — D68.69 SECONDARY HYPERCOAGULABLE STATE (HCC): Status: ACTIVE | Noted: 2023-05-16

## 2023-05-16 PROCEDURE — 99213 OFFICE O/P EST LOW 20 MIN: CPT | Performed by: NURSE PRACTITIONER

## 2023-05-16 PROCEDURE — 3074F SYST BP LT 130 MM HG: CPT | Performed by: NURSE PRACTITIONER

## 2023-05-16 PROCEDURE — 3078F DIAST BP <80 MM HG: CPT | Performed by: NURSE PRACTITIONER

## 2023-05-16 PROCEDURE — 36415 COLL VENOUS BLD VENIPUNCTURE: CPT | Performed by: NURSE PRACTITIONER

## 2023-05-16 PROCEDURE — G0439 PPPS, SUBSEQ VISIT: HCPCS | Performed by: NURSE PRACTITIONER

## 2023-05-16 PROCEDURE — 3044F HG A1C LEVEL LT 7.0%: CPT | Performed by: NURSE PRACTITIONER

## 2023-05-16 PROCEDURE — 1123F ACP DISCUSS/DSCN MKR DOCD: CPT | Performed by: NURSE PRACTITIONER

## 2023-05-16 RX ORDER — OXYBUTYNIN CHLORIDE 5 MG/1
5 TABLET ORAL 3 TIMES DAILY
Qty: 270 TABLET | Refills: 3 | Status: SHIPPED | OUTPATIENT
Start: 2023-05-16 | End: 2023-06-15

## 2023-05-16 RX ORDER — FLUOXETINE HYDROCHLORIDE 40 MG/1
40 CAPSULE ORAL DAILY
Qty: 90 CAPSULE | Refills: 3 | Status: SHIPPED | OUTPATIENT
Start: 2023-05-16

## 2023-05-16 RX ORDER — METOPROLOL SUCCINATE 100 MG/1
1 TABLET, EXTENDED RELEASE ORAL EVERY EVENING
COMMUNITY
Start: 2023-05-12

## 2023-05-16 RX ORDER — ACETAMINOPHEN 500 MG
500 TABLET ORAL EVERY 6 HOURS PRN
COMMUNITY

## 2023-05-16 SDOH — ECONOMIC STABILITY: INCOME INSECURITY: HOW HARD IS IT FOR YOU TO PAY FOR THE VERY BASICS LIKE FOOD, HOUSING, MEDICAL CARE, AND HEATING?: NOT VERY HARD

## 2023-05-16 SDOH — ECONOMIC STABILITY: FOOD INSECURITY: WITHIN THE PAST 12 MONTHS, YOU WORRIED THAT YOUR FOOD WOULD RUN OUT BEFORE YOU GOT MONEY TO BUY MORE.: NEVER TRUE

## 2023-05-16 SDOH — HEALTH STABILITY: PHYSICAL HEALTH: ON AVERAGE, HOW MANY DAYS PER WEEK DO YOU ENGAGE IN MODERATE TO STRENUOUS EXERCISE (LIKE A BRISK WALK)?: 0 DAYS

## 2023-05-16 SDOH — HEALTH STABILITY: PHYSICAL HEALTH: ON AVERAGE, HOW MANY MINUTES DO YOU ENGAGE IN EXERCISE AT THIS LEVEL?: 0 MIN

## 2023-05-16 SDOH — ECONOMIC STABILITY: FOOD INSECURITY: WITHIN THE PAST 12 MONTHS, THE FOOD YOU BOUGHT JUST DIDN'T LAST AND YOU DIDN'T HAVE MONEY TO GET MORE.: NEVER TRUE

## 2023-05-16 SDOH — ECONOMIC STABILITY: HOUSING INSECURITY
IN THE LAST 12 MONTHS, WAS THERE A TIME WHEN YOU DID NOT HAVE A STEADY PLACE TO SLEEP OR SLEPT IN A SHELTER (INCLUDING NOW)?: NO

## 2023-05-16 SDOH — ECONOMIC STABILITY: TRANSPORTATION INSECURITY
IN THE PAST 12 MONTHS, HAS LACK OF TRANSPORTATION KEPT YOU FROM MEETINGS, WORK, OR FROM GETTING THINGS NEEDED FOR DAILY LIVING?: NO

## 2023-05-16 ASSESSMENT — LIFESTYLE VARIABLES
HOW MANY STANDARD DRINKS CONTAINING ALCOHOL DO YOU HAVE ON A TYPICAL DAY: PATIENT DOES NOT DRINK
HOW MANY STANDARD DRINKS CONTAINING ALCOHOL DO YOU HAVE ON A TYPICAL DAY: 0
HOW OFTEN DO YOU HAVE SIX OR MORE DRINKS ON ONE OCCASION: 1
HOW OFTEN DO YOU HAVE A DRINK CONTAINING ALCOHOL: NEVER
HOW OFTEN DO YOU HAVE A DRINK CONTAINING ALCOHOL: 1

## 2023-05-16 ASSESSMENT — ENCOUNTER SYMPTOMS
BLOOD IN STOOL: 0
COUGH: 0
DIARRHEA: 0
CHEST TIGHTNESS: 0
CONSTIPATION: 0
SHORTNESS OF BREATH: 0

## 2023-05-17 LAB
25(OH)D3 SERPL-MCNC: 17.5 NG/ML
ALBUMIN SERPL-MCNC: 3.9 G/DL (ref 3.4–5)
ALBUMIN/GLOB SERPL: 1.6 {RATIO} (ref 1.1–2.2)
ALP SERPL-CCNC: 120 U/L (ref 40–129)
ALT SERPL-CCNC: 13 U/L (ref 10–40)
ANION GAP SERPL CALCULATED.3IONS-SCNC: 16 MMOL/L (ref 3–16)
AST SERPL-CCNC: 18 U/L (ref 15–37)
BASOPHILS # BLD: 0.1 K/UL (ref 0–0.2)
BASOPHILS NFR BLD: 1 %
BILIRUB SERPL-MCNC: 0.5 MG/DL (ref 0–1)
BUN SERPL-MCNC: 17 MG/DL (ref 7–20)
CALCIUM SERPL-MCNC: 8.7 MG/DL (ref 8.3–10.6)
CHLORIDE SERPL-SCNC: 102 MMOL/L (ref 99–110)
CO2 SERPL-SCNC: 22 MMOL/L (ref 21–32)
CREAT SERPL-MCNC: 1 MG/DL (ref 0.6–1.2)
DEPRECATED RDW RBC AUTO: 18 % (ref 12.4–15.4)
EOSINOPHIL # BLD: 0 K/UL (ref 0–0.6)
EOSINOPHIL NFR BLD: 0.2 %
FERRITIN SERPL IA-MCNC: 13.7 NG/ML (ref 15–150)
GFR SERPLBLD CREATININE-BSD FMLA CKD-EPI: 59 ML/MIN/{1.73_M2}
GLUCOSE SERPL-MCNC: 88 MG/DL (ref 70–99)
HCT VFR BLD AUTO: 32.3 % (ref 36–48)
HGB BLD-MCNC: 10.1 G/DL (ref 12–16)
IRON SATN MFR SERPL: 8 % (ref 15–50)
IRON SERPL-MCNC: 30 UG/DL (ref 37–145)
LYMPHOCYTES # BLD: 1 K/UL (ref 1–5.1)
LYMPHOCYTES NFR BLD: 17.9 %
MCH RBC QN AUTO: 24.7 PG (ref 26–34)
MCHC RBC AUTO-ENTMCNC: 31.4 G/DL (ref 31–36)
MCV RBC AUTO: 78.9 FL (ref 80–100)
MONOCYTES # BLD: 0.5 K/UL (ref 0–1.3)
MONOCYTES NFR BLD: 9.3 %
NEUTROPHILS # BLD: 4.2 K/UL (ref 1.7–7.7)
NEUTROPHILS NFR BLD: 71.6 %
PLATELET # BLD AUTO: 226 K/UL (ref 135–450)
PMV BLD AUTO: 8 FL (ref 5–10.5)
POTASSIUM SERPL-SCNC: 3.7 MMOL/L (ref 3.5–5.1)
PROT SERPL-MCNC: 6.4 G/DL (ref 6.4–8.2)
PTH-INTACT SERPL-MCNC: 75.8 PG/ML (ref 14–72)
RBC # BLD AUTO: 4.1 M/UL (ref 4–5.2)
SODIUM SERPL-SCNC: 140 MMOL/L (ref 136–145)
TIBC SERPL-MCNC: 388 UG/DL (ref 260–445)
WBC # BLD AUTO: 5.9 K/UL (ref 4–11)

## 2023-05-18 LAB
EST. AVERAGE GLUCOSE BLD GHB EST-MCNC: 128.4 MG/DL
HBA1C MFR BLD: 6.1 %

## 2023-05-19 ENCOUNTER — PATIENT MESSAGE (OUTPATIENT)
Dept: FAMILY MEDICINE CLINIC | Age: 78
End: 2023-05-19

## 2023-05-30 ENCOUNTER — HOSPITAL ENCOUNTER (OUTPATIENT)
Dept: MRI IMAGING | Age: 78
Discharge: HOME | End: 2023-05-30
Payer: MEDICARE

## 2023-05-30 DIAGNOSIS — M51.36 DEGENERATIVE DISC DISEASE, LUMBAR: ICD-10-CM

## 2023-05-30 PROCEDURE — 72148 MRI LUMBAR SPINE W/O DYE: CPT

## 2023-06-02 NOTE — RESULT ENCOUNTER NOTE
MRI showing showing arthritis in the spine and a small lesion on one of your vertebrae that I am not sure what it is. I think we need to go see the Spine surgeon and discuss next steps with them.

## 2023-07-03 ENCOUNTER — HOSPITAL ENCOUNTER (INPATIENT)
Age: 78
LOS: 2 days | Discharge: HOME | DRG: 308 | End: 2023-07-05
Attending: STUDENT IN AN ORGANIZED HEALTH CARE EDUCATION/TRAINING PROGRAM | Admitting: INTERNAL MEDICINE
Payer: MEDICARE

## 2023-07-03 ENCOUNTER — APPOINTMENT (OUTPATIENT)
Dept: GENERAL RADIOLOGY | Age: 78
DRG: 308 | End: 2023-07-03
Payer: MEDICARE

## 2023-07-03 DIAGNOSIS — R06.02 SHORTNESS OF BREATH: ICD-10-CM

## 2023-07-03 DIAGNOSIS — I48.0 PAROXYSMAL ATRIAL FIBRILLATION (HCC): ICD-10-CM

## 2023-07-03 DIAGNOSIS — R07.9 CHEST PAIN, UNSPECIFIED TYPE: Primary | ICD-10-CM

## 2023-07-03 DIAGNOSIS — E87.6 HYPOKALEMIA: ICD-10-CM

## 2023-07-03 DIAGNOSIS — J18.9 PNEUMONIA DUE TO INFECTIOUS ORGANISM, UNSPECIFIED LATERALITY, UNSPECIFIED PART OF LUNG: ICD-10-CM

## 2023-07-03 DIAGNOSIS — E83.42 HYPOMAGNESEMIA: ICD-10-CM

## 2023-07-03 PROBLEM — R55 PRE-SYNCOPE: Status: ACTIVE | Noted: 2023-07-03

## 2023-07-03 LAB
ALBUMIN SERPL-MCNC: 3.6 G/DL (ref 3.4–5)
ALBUMIN/GLOB SERPL: 1.3 {RATIO} (ref 1.1–2.2)
ALP SERPL-CCNC: 100 U/L (ref 40–129)
ALT SERPL-CCNC: <5 U/L (ref 10–40)
ANION GAP SERPL CALCULATED.3IONS-SCNC: 12 MMOL/L (ref 3–16)
AST SERPL-CCNC: 12 U/L (ref 15–37)
BASOPHILS # BLD: 0 K/UL (ref 0–0.2)
BASOPHILS NFR BLD: 0.5 %
BILIRUB SERPL-MCNC: 0.7 MG/DL (ref 0–1)
BUN SERPL-MCNC: 16 MG/DL (ref 7–20)
CALCIUM SERPL-MCNC: 9.3 MG/DL (ref 8.3–10.6)
CHLORIDE SERPL-SCNC: 104 MMOL/L (ref 99–110)
CO2 SERPL-SCNC: 25 MMOL/L (ref 21–32)
CREAT SERPL-MCNC: 0.9 MG/DL (ref 0.6–1.2)
DEPRECATED RDW RBC AUTO: 17.7 % (ref 12.4–15.4)
EOSINOPHIL # BLD: 0.1 K/UL (ref 0–0.6)
EOSINOPHIL NFR BLD: 1.1 %
GFR SERPLBLD CREATININE-BSD FMLA CKD-EPI: >60 ML/MIN/{1.73_M2}
GLUCOSE BLD-MCNC: 131 MG/DL (ref 70–99)
GLUCOSE SERPL-MCNC: 120 MG/DL (ref 70–99)
HCT VFR BLD AUTO: 29.8 % (ref 36–48)
HGB BLD-MCNC: 9.3 G/DL (ref 12–16)
LYMPHOCYTES # BLD: 1.5 K/UL (ref 1–5.1)
LYMPHOCYTES NFR BLD: 24.5 %
MAGNESIUM SERPL-MCNC: 1.2 MG/DL (ref 1.8–2.4)
MCH RBC QN AUTO: 25.1 PG (ref 26–34)
MCHC RBC AUTO-ENTMCNC: 31.3 G/DL (ref 31–36)
MCV RBC AUTO: 80.3 FL (ref 80–100)
MONOCYTES # BLD: 0.7 K/UL (ref 0–1.3)
MONOCYTES NFR BLD: 10.8 %
NEUTROPHILS # BLD: 3.8 K/UL (ref 1.7–7.7)
NEUTROPHILS NFR BLD: 63.1 %
NT-PROBNP SERPL-MCNC: 2416 PG/ML (ref 0–449)
PERFORMED ON: ABNORMAL
PLATELET # BLD AUTO: 251 K/UL (ref 135–450)
PMV BLD AUTO: 8.3 FL (ref 5–10.5)
POTASSIUM SERPL-SCNC: 3.3 MMOL/L (ref 3.5–5.1)
PROT SERPL-MCNC: 6.3 G/DL (ref 6.4–8.2)
RBC # BLD AUTO: 3.71 M/UL (ref 4–5.2)
SODIUM SERPL-SCNC: 141 MMOL/L (ref 136–145)
TROPONIN, HIGH SENSITIVITY: 11 NG/L (ref 0–14)
TROPONIN, HIGH SENSITIVITY: 9 NG/L (ref 0–14)
WBC # BLD AUTO: 6.1 K/UL (ref 4–11)

## 2023-07-03 PROCEDURE — 6370000000 HC RX 637 (ALT 250 FOR IP): Performed by: PHYSICIAN ASSISTANT

## 2023-07-03 PROCEDURE — 83880 ASSAY OF NATRIURETIC PEPTIDE: CPT

## 2023-07-03 PROCEDURE — 36415 COLL VENOUS BLD VENIPUNCTURE: CPT

## 2023-07-03 PROCEDURE — 96366 THER/PROPH/DIAG IV INF ADDON: CPT

## 2023-07-03 PROCEDURE — 84145 PROCALCITONIN (PCT): CPT

## 2023-07-03 PROCEDURE — 84100 ASSAY OF PHOSPHORUS: CPT

## 2023-07-03 PROCEDURE — 96374 THER/PROPH/DIAG INJ IV PUSH: CPT

## 2023-07-03 PROCEDURE — 80053 COMPREHEN METABOLIC PANEL: CPT

## 2023-07-03 PROCEDURE — 85025 COMPLETE CBC W/AUTO DIFF WBC: CPT

## 2023-07-03 PROCEDURE — 96365 THER/PROPH/DIAG IV INF INIT: CPT

## 2023-07-03 PROCEDURE — 93005 ELECTROCARDIOGRAM TRACING: CPT | Performed by: PHYSICIAN ASSISTANT

## 2023-07-03 PROCEDURE — 83735 ASSAY OF MAGNESIUM: CPT

## 2023-07-03 PROCEDURE — 2060000000 HC ICU INTERMEDIATE R&B

## 2023-07-03 PROCEDURE — 71046 X-RAY EXAM CHEST 2 VIEWS: CPT

## 2023-07-03 PROCEDURE — 6360000002 HC RX W HCPCS: Performed by: PHYSICIAN ASSISTANT

## 2023-07-03 PROCEDURE — 84484 ASSAY OF TROPONIN QUANT: CPT

## 2023-07-03 PROCEDURE — 86140 C-REACTIVE PROTEIN: CPT

## 2023-07-03 PROCEDURE — 99285 EMERGENCY DEPT VISIT HI MDM: CPT

## 2023-07-03 RX ORDER — PANTOPRAZOLE SODIUM 40 MG/1
40 TABLET, DELAYED RELEASE ORAL
Status: DISCONTINUED | OUTPATIENT
Start: 2023-07-04 | End: 2023-07-05 | Stop reason: HOSPADM

## 2023-07-03 RX ORDER — ATORVASTATIN CALCIUM 40 MG/1
40 TABLET, FILM COATED ORAL DAILY
Status: DISCONTINUED | OUTPATIENT
Start: 2023-07-04 | End: 2023-07-05 | Stop reason: HOSPADM

## 2023-07-03 RX ORDER — ACETAMINOPHEN 325 MG/1
650 TABLET ORAL EVERY 6 HOURS PRN
Status: DISCONTINUED | OUTPATIENT
Start: 2023-07-03 | End: 2023-07-05 | Stop reason: HOSPADM

## 2023-07-03 RX ORDER — ONDANSETRON 4 MG/1
4 TABLET, ORALLY DISINTEGRATING ORAL EVERY 8 HOURS PRN
Status: DISCONTINUED | OUTPATIENT
Start: 2023-07-03 | End: 2023-07-05 | Stop reason: HOSPADM

## 2023-07-03 RX ORDER — OXYBUTYNIN CHLORIDE 5 MG/1
5 TABLET ORAL 3 TIMES DAILY
Status: DISCONTINUED | OUTPATIENT
Start: 2023-07-04 | End: 2023-07-05 | Stop reason: HOSPADM

## 2023-07-03 RX ORDER — MAGNESIUM SULFATE IN WATER 40 MG/ML
4000 INJECTION, SOLUTION INTRAVENOUS ONCE
Status: COMPLETED | OUTPATIENT
Start: 2023-07-03 | End: 2023-07-03

## 2023-07-03 RX ORDER — ONDANSETRON 2 MG/ML
4 INJECTION INTRAMUSCULAR; INTRAVENOUS EVERY 6 HOURS PRN
Status: DISCONTINUED | OUTPATIENT
Start: 2023-07-03 | End: 2023-07-05 | Stop reason: HOSPADM

## 2023-07-03 RX ORDER — CETIRIZINE HYDROCHLORIDE 10 MG/1
10 TABLET ORAL DAILY
Status: DISCONTINUED | OUTPATIENT
Start: 2023-07-04 | End: 2023-07-05 | Stop reason: HOSPADM

## 2023-07-03 RX ORDER — SODIUM CHLORIDE 0.9 % (FLUSH) 0.9 %
5-40 SYRINGE (ML) INJECTION EVERY 12 HOURS SCHEDULED
Status: DISCONTINUED | OUTPATIENT
Start: 2023-07-04 | End: 2023-07-05 | Stop reason: HOSPADM

## 2023-07-03 RX ORDER — METOPROLOL SUCCINATE 50 MG/1
100 TABLET, EXTENDED RELEASE ORAL EVERY EVENING
Status: DISCONTINUED | OUTPATIENT
Start: 2023-07-04 | End: 2023-07-05 | Stop reason: HOSPADM

## 2023-07-03 RX ORDER — METHOCARBAMOL 500 MG/1
500 TABLET, FILM COATED ORAL 4 TIMES DAILY
Status: DISCONTINUED | OUTPATIENT
Start: 2023-07-04 | End: 2023-07-04

## 2023-07-03 RX ORDER — VITAMIN B COMPLEX
2000 TABLET ORAL
Status: DISCONTINUED | OUTPATIENT
Start: 2023-07-04 | End: 2023-07-05 | Stop reason: HOSPADM

## 2023-07-03 RX ORDER — ASPIRIN 81 MG/1
324 TABLET, CHEWABLE ORAL ONCE
Status: COMPLETED | OUTPATIENT
Start: 2023-07-03 | End: 2023-07-03

## 2023-07-03 RX ORDER — PROPAFENONE HYDROCHLORIDE 150 MG/1
150 TABLET, COATED ORAL EVERY 8 HOURS
Status: DISCONTINUED | OUTPATIENT
Start: 2023-07-04 | End: 2023-07-05 | Stop reason: HOSPADM

## 2023-07-03 RX ORDER — ACETAMINOPHEN 650 MG/1
650 SUPPOSITORY RECTAL EVERY 6 HOURS PRN
Status: DISCONTINUED | OUTPATIENT
Start: 2023-07-03 | End: 2023-07-05 | Stop reason: HOSPADM

## 2023-07-03 RX ORDER — DOXYCYCLINE HYCLATE 100 MG
100 TABLET ORAL ONCE
Status: COMPLETED | OUTPATIENT
Start: 2023-07-03 | End: 2023-07-03

## 2023-07-03 RX ORDER — SODIUM CHLORIDE 0.9 % (FLUSH) 0.9 %
5-40 SYRINGE (ML) INJECTION PRN
Status: DISCONTINUED | OUTPATIENT
Start: 2023-07-03 | End: 2023-07-05 | Stop reason: HOSPADM

## 2023-07-03 RX ORDER — FLUOXETINE HYDROCHLORIDE 20 MG/1
40 CAPSULE ORAL DAILY
Status: DISCONTINUED | OUTPATIENT
Start: 2023-07-04 | End: 2023-07-05 | Stop reason: HOSPADM

## 2023-07-03 RX ORDER — AZITHROMYCIN 250 MG/1
500 TABLET, FILM COATED ORAL ONCE
Status: COMPLETED | OUTPATIENT
Start: 2023-07-04 | End: 2023-07-04

## 2023-07-03 RX ORDER — POLYETHYLENE GLYCOL 3350 17 G/17G
17 POWDER, FOR SOLUTION ORAL DAILY PRN
Status: DISCONTINUED | OUTPATIENT
Start: 2023-07-03 | End: 2023-07-05 | Stop reason: HOSPADM

## 2023-07-03 RX ORDER — FERROUS SULFATE 325(65) MG
325 TABLET ORAL
Status: DISCONTINUED | OUTPATIENT
Start: 2023-07-04 | End: 2023-07-04

## 2023-07-03 RX ORDER — SODIUM CHLORIDE 9 MG/ML
INJECTION, SOLUTION INTRAVENOUS PRN
Status: DISCONTINUED | OUTPATIENT
Start: 2023-07-03 | End: 2023-07-05 | Stop reason: HOSPADM

## 2023-07-03 RX ORDER — AZITHROMYCIN 250 MG/1
250 TABLET, FILM COATED ORAL DAILY
Status: DISCONTINUED | OUTPATIENT
Start: 2023-07-04 | End: 2023-07-04

## 2023-07-03 RX ORDER — POTASSIUM CHLORIDE 20 MEQ/1
40 TABLET, EXTENDED RELEASE ORAL ONCE
Status: COMPLETED | OUTPATIENT
Start: 2023-07-03 | End: 2023-07-03

## 2023-07-03 RX ADMIN — ASPIRIN 324 MG: 81 TABLET, CHEWABLE ORAL at 21:44

## 2023-07-03 RX ADMIN — DOXYCYCLINE HYCLATE 100 MG: 100 TABLET, COATED ORAL at 23:14

## 2023-07-03 RX ADMIN — MAGNESIUM SULFATE HEPTAHYDRATE 4000 MG: 40 INJECTION, SOLUTION INTRAVENOUS at 19:29

## 2023-07-03 RX ADMIN — POTASSIUM CHLORIDE 40 MEQ: 1500 TABLET, EXTENDED RELEASE ORAL at 19:26

## 2023-07-03 ASSESSMENT — LIFESTYLE VARIABLES
HOW MANY STANDARD DRINKS CONTAINING ALCOHOL DO YOU HAVE ON A TYPICAL DAY: PATIENT DOES NOT DRINK
HOW OFTEN DO YOU HAVE A DRINK CONTAINING ALCOHOL: NEVER

## 2023-07-03 ASSESSMENT — PAIN DESCRIPTION - DESCRIPTORS: DESCRIPTORS: DISCOMFORT

## 2023-07-03 ASSESSMENT — PAIN DESCRIPTION - LOCATION: LOCATION: CHEST

## 2023-07-03 ASSESSMENT — PAIN SCALES - GENERAL: PAINLEVEL_OUTOF10: 1

## 2023-07-03 ASSESSMENT — PAIN - FUNCTIONAL ASSESSMENT
PAIN_FUNCTIONAL_ASSESSMENT: 0-10
PAIN_FUNCTIONAL_ASSESSMENT: NONE - DENIES PAIN

## 2023-07-03 NOTE — ED PROVIDER NOTES
I independently examined and evaluated Oscar Medeiros. I personally saw the patient and performed a substantive portion of the visit including all aspects of the medical decision making. In brief, this 79-year-old female is presenting with pressure like chest pain with shortness of breath that started earlier this evening. Denies any nausea or vomiting. States he has never had symptoms like this before. Has been having some mild discomfort for the last week. States that when she got up and walked to the bathroom here that did make her symptoms worse. Focused exam revealed   General: Alert, no acute distress, patient resting comfortably   Skin: warm, intact, no pallor noted   Head: Normocephalic, atraumatic   Eye: Normal conjunctiva   Cardiac: Normal peripheral perfusion  Respiratory: No acute distress   Musculoskeletal: No deformity, full ROM. Neurological: alert and oriented, normal sensory and motor observed. Psychiatric: Cooperative    ED course: Cardiac work-up obtained. Troponin negative and EKG shows A-fib without ischemic process. Magnesium is low at 1.2 and BNP is mildly elevated at 2000. Patient is not had a stress test or cardiac catheterization in several years. With her exertional chest pain, I am concerned for possible cardiac source. Will admit for further evaluation. All diagnostic, treatment, and disposition decisions were made by myself in conjunction with the advanced practice provider. For all further details of the patient's emergency department visit, please see the advanced practice provider's documentation. Comment: Please note this report has been produced using speech recognition software and may contain errors related to that system including errors in grammar, punctuation, and spelling, as well as words and phrases that may be inappropriate.  If there are any questions or concerns please feel free to contact the dictating provider for
5. Pneumonia due to infectious organism, unspecified laterality, unspecified part of lung    6. Shortness of breath          DISPOSITION/PLAN     DISPOSITION Decision To Admit 07/03/2023 09:59:07 PM      PATIENT REFERRED TO:  No follow-up provider specified.     DISCHARGE MEDICATIONS:  New Prescriptions    No medications on file       DISCONTINUED MEDICATIONS:  Discontinued Medications    VITAMIN D (ERGOCALCIFEROL) 1.25 MG (74967 UT) CAPS CAPSULE    Take 1 capsule by mouth once a week              (Please note that portions of this note were completed with a voice recognition program.  Efforts were made to edit the dictations but occasionally words are mis-transcribed.)    KARINE Rm (electronically signed)            KARINE Rm  07/04/23 5411

## 2023-07-03 NOTE — ED NOTES
1715-12 Lead ECG completed and given to Dr. Luis Miguel Solorio for review.       Yesenia Laureano  07/03/23 2681

## 2023-07-04 PROBLEM — E87.6 HYPOKALEMIA: Status: ACTIVE | Noted: 2023-07-04

## 2023-07-04 PROBLEM — R06.00 DYSPNEA: Status: ACTIVE | Noted: 2023-07-04

## 2023-07-04 PROBLEM — R07.9 CHEST PAIN: Status: ACTIVE | Noted: 2023-07-04

## 2023-07-04 PROBLEM — J18.9 PNEUMONIA DUE TO INFECTIOUS ORGANISM: Status: ACTIVE | Noted: 2023-07-04

## 2023-07-04 PROBLEM — R55 PRE-SYNCOPE: Status: RESOLVED | Noted: 2023-07-03 | Resolved: 2023-07-04

## 2023-07-04 PROBLEM — E83.42 HYPOMAGNESEMIA: Status: ACTIVE | Noted: 2023-07-04

## 2023-07-04 LAB
ANION GAP SERPL CALCULATED.3IONS-SCNC: 13 MMOL/L (ref 3–16)
BASOPHILS # BLD: 0.1 K/UL (ref 0–0.2)
BASOPHILS NFR BLD: 0.7 %
BUN SERPL-MCNC: 15 MG/DL (ref 7–20)
CALCIUM SERPL-MCNC: 8.7 MG/DL (ref 8.3–10.6)
CHLORIDE SERPL-SCNC: 104 MMOL/L (ref 99–110)
CO2 SERPL-SCNC: 21 MMOL/L (ref 21–32)
CREAT SERPL-MCNC: 0.8 MG/DL (ref 0.6–1.2)
CRP SERPL-MCNC: <3 MG/L (ref 0–5.1)
DEPRECATED RDW RBC AUTO: 17.3 % (ref 12.4–15.4)
EKG ATRIAL RATE: 357 BPM
EKG DIAGNOSIS: NORMAL
EKG Q-T INTERVAL: 390 MS
EKG QRS DURATION: 86 MS
EKG QTC CALCULATION (BAZETT): 479 MS
EKG R AXIS: -3 DEGREES
EKG T AXIS: 66 DEGREES
EKG VENTRICULAR RATE: 91 BPM
EOSINOPHIL # BLD: 0.1 K/UL (ref 0–0.6)
EOSINOPHIL NFR BLD: 1.3 %
GFR SERPLBLD CREATININE-BSD FMLA CKD-EPI: >60 ML/MIN/{1.73_M2}
GLUCOSE SERPL-MCNC: 92 MG/DL (ref 70–99)
HCT VFR BLD AUTO: 28.3 % (ref 36–48)
HGB BLD-MCNC: 9.1 G/DL (ref 12–16)
LYMPHOCYTES # BLD: 1.9 K/UL (ref 1–5.1)
LYMPHOCYTES NFR BLD: 24.1 %
MAGNESIUM SERPL-MCNC: 2.1 MG/DL (ref 1.8–2.4)
MCH RBC QN AUTO: 25.2 PG (ref 26–34)
MCHC RBC AUTO-ENTMCNC: 32 G/DL (ref 31–36)
MCV RBC AUTO: 78.7 FL (ref 80–100)
MONOCYTES # BLD: 0.9 K/UL (ref 0–1.3)
MONOCYTES NFR BLD: 11.3 %
NEUTROPHILS # BLD: 4.8 K/UL (ref 1.7–7.7)
NEUTROPHILS NFR BLD: 62.6 %
PHOSPHATE SERPL-MCNC: 2.4 MG/DL (ref 2.5–4.9)
PLATELET # BLD AUTO: 230 K/UL (ref 135–450)
PMV BLD AUTO: 7.9 FL (ref 5–10.5)
POTASSIUM SERPL-SCNC: 3.8 MMOL/L (ref 3.5–5.1)
PROCALCITONIN SERPL IA-MCNC: 0.09 NG/ML (ref 0–0.15)
RBC # BLD AUTO: 3.6 M/UL (ref 4–5.2)
SODIUM SERPL-SCNC: 138 MMOL/L (ref 136–145)
TROPONIN, HIGH SENSITIVITY: 10 NG/L (ref 0–14)
TSH SERPL DL<=0.005 MIU/L-ACNC: 1.47 UIU/ML (ref 0.27–4.2)
WBC # BLD AUTO: 7.7 K/UL (ref 4–11)

## 2023-07-04 PROCEDURE — 6360000002 HC RX W HCPCS: Performed by: INTERNAL MEDICINE

## 2023-07-04 PROCEDURE — 2580000003 HC RX 258: Performed by: PEDIATRICS

## 2023-07-04 PROCEDURE — 6360000002 HC RX W HCPCS: Performed by: PHYSICIAN ASSISTANT

## 2023-07-04 PROCEDURE — 85025 COMPLETE CBC W/AUTO DIFF WBC: CPT

## 2023-07-04 PROCEDURE — 2580000003 HC RX 258: Performed by: INTERNAL MEDICINE

## 2023-07-04 PROCEDURE — 99222 1ST HOSP IP/OBS MODERATE 55: CPT | Performed by: INTERNAL MEDICINE

## 2023-07-04 PROCEDURE — 99232 SBSQ HOSP IP/OBS MODERATE 35: CPT | Performed by: INTERNAL MEDICINE

## 2023-07-04 PROCEDURE — 96366 THER/PROPH/DIAG IV INF ADDON: CPT

## 2023-07-04 PROCEDURE — 36415 COLL VENOUS BLD VENIPUNCTURE: CPT

## 2023-07-04 PROCEDURE — 83735 ASSAY OF MAGNESIUM: CPT

## 2023-07-04 PROCEDURE — 93010 ELECTROCARDIOGRAM REPORT: CPT | Performed by: INTERNAL MEDICINE

## 2023-07-04 PROCEDURE — 80048 BASIC METABOLIC PNL TOTAL CA: CPT

## 2023-07-04 PROCEDURE — 84443 ASSAY THYROID STIM HORMONE: CPT

## 2023-07-04 PROCEDURE — 2060000000 HC ICU INTERMEDIATE R&B

## 2023-07-04 PROCEDURE — 2580000003 HC RX 258: Performed by: PHYSICIAN ASSISTANT

## 2023-07-04 PROCEDURE — 84484 ASSAY OF TROPONIN QUANT: CPT

## 2023-07-04 PROCEDURE — 6370000000 HC RX 637 (ALT 250 FOR IP): Performed by: PEDIATRICS

## 2023-07-04 PROCEDURE — 6360000002 HC RX W HCPCS: Performed by: PEDIATRICS

## 2023-07-04 PROCEDURE — 6370000000 HC RX 637 (ALT 250 FOR IP): Performed by: INTERNAL MEDICINE

## 2023-07-04 PROCEDURE — 96367 TX/PROPH/DG ADDL SEQ IV INF: CPT

## 2023-07-04 RX ORDER — LIDOCAINE 4 G/G
1 PATCH TOPICAL DAILY
Status: DISCONTINUED | OUTPATIENT
Start: 2023-07-04 | End: 2023-07-05 | Stop reason: HOSPADM

## 2023-07-04 RX ORDER — METHOCARBAMOL 500 MG/1
500 TABLET, FILM COATED ORAL 4 TIMES DAILY PRN
Status: DISCONTINUED | OUTPATIENT
Start: 2023-07-04 | End: 2023-07-05 | Stop reason: HOSPADM

## 2023-07-04 RX ORDER — LEVOFLOXACIN 750 MG/1
750 TABLET ORAL DAILY
Status: DISCONTINUED | OUTPATIENT
Start: 2023-07-04 | End: 2023-07-05

## 2023-07-04 RX ORDER — LEVOFLOXACIN 750 MG/1
750 TABLET ORAL DAILY
Status: DISCONTINUED | OUTPATIENT
Start: 2023-07-04 | End: 2023-07-04

## 2023-07-04 RX ORDER — SENNA PLUS 8.6 MG/1
1 TABLET ORAL NIGHTLY
Status: DISCONTINUED | OUTPATIENT
Start: 2023-07-04 | End: 2023-07-05 | Stop reason: HOSPADM

## 2023-07-04 RX ADMIN — Medication 2000 UNITS: at 16:58

## 2023-07-04 RX ADMIN — ATORVASTATIN CALCIUM 40 MG: 40 TABLET, FILM COATED ORAL at 09:03

## 2023-07-04 RX ADMIN — OXYBUTYNIN CHLORIDE 5 MG: 5 TABLET ORAL at 13:35

## 2023-07-04 RX ADMIN — OXYBUTYNIN CHLORIDE 5 MG: 5 TABLET ORAL at 00:19

## 2023-07-04 RX ADMIN — Medication 10 ML: at 22:23

## 2023-07-04 RX ADMIN — PROPAFENONE HYDROCHLORIDE 150 MG: 150 TABLET, FILM COATED ORAL at 09:04

## 2023-07-04 RX ADMIN — OXYBUTYNIN CHLORIDE 5 MG: 5 TABLET ORAL at 22:17

## 2023-07-04 RX ADMIN — APIXABAN 5 MG: 5 TABLET, FILM COATED ORAL at 00:19

## 2023-07-04 RX ADMIN — METOPROLOL SUCCINATE 100 MG: 50 TABLET, EXTENDED RELEASE ORAL at 16:58

## 2023-07-04 RX ADMIN — PANTOPRAZOLE SODIUM 40 MG: 40 TABLET, DELAYED RELEASE ORAL at 05:37

## 2023-07-04 RX ADMIN — AZITHROMYCIN 500 MG: 250 TABLET, FILM COATED ORAL at 00:19

## 2023-07-04 RX ADMIN — APIXABAN 5 MG: 5 TABLET, FILM COATED ORAL at 22:17

## 2023-07-04 RX ADMIN — CEFTRIAXONE SODIUM 1000 MG: 1 INJECTION, POWDER, FOR SOLUTION INTRAMUSCULAR; INTRAVENOUS at 00:04

## 2023-07-04 RX ADMIN — PROPAFENONE HYDROCHLORIDE 150 MG: 150 TABLET, FILM COATED ORAL at 16:58

## 2023-07-04 RX ADMIN — CETIRIZINE HYDROCHLORIDE 10 MG: 10 TABLET ORAL at 09:03

## 2023-07-04 RX ADMIN — CEFTRIAXONE 2000 MG: 2 INJECTION, POWDER, FOR SOLUTION INTRAMUSCULAR; INTRAVENOUS at 09:10

## 2023-07-04 RX ADMIN — PROPAFENONE HYDROCHLORIDE 150 MG: 150 TABLET, FILM COATED ORAL at 22:17

## 2023-07-04 RX ADMIN — METOPROLOL SUCCINATE 100 MG: 50 TABLET, EXTENDED RELEASE ORAL at 00:19

## 2023-07-04 RX ADMIN — APIXABAN 5 MG: 5 TABLET, FILM COATED ORAL at 09:05

## 2023-07-04 RX ADMIN — OXYBUTYNIN CHLORIDE 5 MG: 5 TABLET ORAL at 09:03

## 2023-07-04 RX ADMIN — Medication 10 ML: at 09:05

## 2023-07-04 RX ADMIN — IRON SUCROSE 200 MG: 20 INJECTION, SOLUTION INTRAVENOUS at 11:09

## 2023-07-04 RX ADMIN — FERROUS SULFATE TAB 325 MG (65 MG ELEMENTAL FE) 325 MG: 325 (65 FE) TAB at 09:03

## 2023-07-04 RX ADMIN — LEVOFLOXACIN 750 MG: 750 TABLET, FILM COATED ORAL at 11:10

## 2023-07-04 RX ADMIN — FLUOXETINE 40 MG: 20 CAPSULE ORAL at 09:04

## 2023-07-04 RX ADMIN — PROPAFENONE HYDROCHLORIDE 150 MG: 150 TABLET, FILM COATED ORAL at 00:19

## 2023-07-04 RX ADMIN — AZITHROMYCIN 250 MG: 250 TABLET, FILM COATED ORAL at 09:03

## 2023-07-04 NOTE — H&P
LABURIN No growth at 18-36 hours 02/08/2019 04:03 PM     Blood Cultures: No results found for: BC  No results found for: BLOODCULT2  Organism:   Lab Results   Component Value Date/Time    ORG Enterobacter cloacae 01/14/2019 12:46 PM       Imaging/Diagnostics Last 24 Hours   XR CHEST (2 VW)    Result Date: 7/3/2023  EXAMINATION: TWO XRAY VIEWS OF THE CHEST 7/3/2023 6:03 pm COMPARISON: Chest x-ray dated 6 November 2008 HISTORY: ORDERING SYSTEM PROVIDED HISTORY: sob, basilar crackles TECHNOLOGIST PROVIDED HISTORY: Reason for exam:->sob, basilar crackles Reason for Exam: sob, &  basilar crackles FINDINGS: Bibasilar airspace opacities and small right pleural effusion. No pneumothorax. Normal cardiomediastinal silhouette     1. Bibasilar airspace opacities and small right pleural effusion. This could represent pneumonia in the appropriate clinical setting.          Electronically signed by Marian Dodd MD on 7/3/2023 at 10:33 PM

## 2023-07-04 NOTE — PROGRESS NOTES
Patient admitted to room 314 from ER. Patient oriented to room, call light, bed rails, phone, lights and bathroom. Patient instructed about the schedule of the day including: vital sign frequency, lab draws, possible tests, frequency of MD and staff rounds, daily weights, I &O's and prescribed diet. 5 Telemetry box in place, patient aware of placement and reason. Bed locked, in lowest position, side rails up 2/4, call light within reach. Recliner Assessment  Patient is able to demonstrate the ability to move from a reclining position to an upright position within the recliner. 4 Eyes Skin Assessment     The patient is being assess for   Admission    I agree that 2 RN's have performed a thorough Head to Toe Skin Assessment on the patient. ALL assessment sites listed below have been assessed. Areas assessed for pressure by both nurses:   []   Head, Face, and Ears   []   Shoulders, Back, and Chest, Abdomen  []   Arms, Elbows, and Hands   []   Coccyx, Sacrum, and Ischium  []   Legs, Feet, and Heels        Skin Assessed Under all Medical Devices by both nurses:  N/A               All Mepilex Borders were peeled back and area peeked at by both nurses:  No: N/A  Please list where Mepilex Borders are located:  N/A             **SHARE this note so that the co-signing nurse is able to place an eSignature**    Co-signer eSignature: Electronically signed by Lionel Carbajal RN on 7/4/23 at 4:43 AM EDT    Does the Patient have Skin Breakdown related to pressure?   No     (Insert Photo here N/A)         Denis Prevention initiated:  NA   Wound Care Orders initiated:  NA      [facility] nurse consulted for Pressure Injury (Stage 3,4, Unstageable, DTI, NWPT, Complex wounds)and New or Established Ostomies:  NA      Primary Nurse eSignature: Electronically signed by Vasu Pagan RN on 7/4/23 at 1:02 AM EDT

## 2023-07-04 NOTE — ED NOTES
ED SBAR report provider to Erin RN. Patient to be transported to Room 314 via stretcher by RN. Patient transported with bedside cardiac monitor and with IV medications infusing. IV site clean, dry, and intact. MEWS score and pain assessed as 0/1 and documented. Updated patient on plan of care.       Mark Brand RN  07/03/23 [id number]

## 2023-07-04 NOTE — ED NOTES
Called [facility] to cancel transfer process.  Per Irma Nuñez pt will be admitted at [facility], [address]  07/03/23 115 [address] [facility] 157

## 2023-07-04 NOTE — CONSULTS
[address] [place] [address]  [phone]        Reason for Consultation/Chief Complaint: \"I have been having palpitations and shortness of breath. \"    History of Present Illness:  Carlos Sanchez is a 78 y.o. patient who presented to the hospital with complaints of having palpitations and shortness of breath for several weeks. She is a vague historian as far as timing. She has parox afib and is on anticoagulation and rhythm control with propafenone as outpatient under care of Dr Lizette Fontaine. Afib goes back to atleast 5 yrs. No chest pain dizziness or syncope. She is compliant with her meds. She has poor appetite and has lost 10lbs over last 6 months  She eats only when hungry that will be cheese sandwitch  She has no meal plans. She has spastic colon. Denies bleeding per rectum or vomiting blood. I have been asked to provide consultation regarding further management and testing. Past Medical History:   has a past medical history of Anemia, Arthritis, Atrial fibrillation ([address]), Back pain, Diabetes mellitus ([address]), Hyperlipidemia, Hypertension, and Total hip replacement. Surgical History:   has a past surgical history that includes Cholecystectomy; Sinus endoscopy; Nasal septum surgery; Dental surgery; Tubal ligation; Tonsillectomy; joint replacement (8-); Colonoscopy (2007); Colonoscopy (08/16/2017); Cardioversion; and Cataract removal.     Social History:   reports that she quit smoking about 15 years ago. Her smoking use included cigarettes. She started smoking about 59 years ago. She has a 22.00 pack-year smoking history. She has never used smokeless tobacco. She reports that she does not drink alcohol and does not use drugs. Family History:  Father colon ca  mother breast ca  maternal grandfather heart attack  family history includes Cancer in her father and mother. Home Medications:  Were reviewed and are listed in nursing record.  and/or listed below  Prior to Admission

## 2023-07-05 ENCOUNTER — ANESTHESIA EVENT (OUTPATIENT)
Dept: ENDOSCOPY | Age: 78
DRG: 308 | End: 2023-07-05
Payer: MEDICARE

## 2023-07-05 ENCOUNTER — ANESTHESIA (OUTPATIENT)
Dept: ENDOSCOPY | Age: 78
DRG: 308 | End: 2023-07-05
Payer: MEDICARE

## 2023-07-05 VITALS
DIASTOLIC BLOOD PRESSURE: 62 MMHG | OXYGEN SATURATION: 96 % | BODY MASS INDEX: 18.49 KG/M2 | WEIGHT: 132.1 LBS | SYSTOLIC BLOOD PRESSURE: 110 MMHG | HEART RATE: 61 BPM | TEMPERATURE: 98.3 F | RESPIRATION RATE: 16 BRPM | HEIGHT: 71 IN

## 2023-07-05 PROBLEM — D50.0 IRON DEFICIENCY ANEMIA DUE TO CHRONIC BLOOD LOSS: Status: ACTIVE | Noted: 2023-07-05

## 2023-07-05 PROBLEM — R63.4 UNINTENTIONAL WEIGHT LOSS OF 10% BODY WEIGHT WITHIN 6 MONTHS: Status: ACTIVE | Noted: 2023-07-05

## 2023-07-05 LAB
ANION GAP SERPL CALCULATED.3IONS-SCNC: 10 MMOL/L (ref 3–16)
BUN SERPL-MCNC: 16 MG/DL (ref 7–20)
CALCIUM SERPL-MCNC: 9.6 MG/DL (ref 8.3–10.6)
CHLORIDE SERPL-SCNC: 101 MMOL/L (ref 99–110)
CO2 SERPL-SCNC: 24 MMOL/L (ref 21–32)
CREAT SERPL-MCNC: 0.9 MG/DL (ref 0.6–1.2)
DEPRECATED RDW RBC AUTO: 17.6 % (ref 12.4–15.4)
EKG ATRIAL RATE: 234 BPM
EKG ATRIAL RATE: 62 BPM
EKG DIAGNOSIS: NORMAL
EKG DIAGNOSIS: NORMAL
EKG P AXIS: 78 DEGREES
EKG P-R INTERVAL: 186 MS
EKG Q-T INTERVAL: 360 MS
EKG Q-T INTERVAL: 388 MS
EKG QRS DURATION: 74 MS
EKG QRS DURATION: 90 MS
EKG QTC CALCULATION (BAZETT): 365 MS
EKG QTC CALCULATION (BAZETT): 487 MS
EKG R AXIS: -16 DEGREES
EKG R AXIS: -58 DEGREES
EKG T AXIS: 70 DEGREES
EKG T AXIS: 77 DEGREES
EKG VENTRICULAR RATE: 62 BPM
EKG VENTRICULAR RATE: 95 BPM
GFR SERPLBLD CREATININE-BSD FMLA CKD-EPI: >60 ML/MIN/{1.73_M2}
GLUCOSE BLD-MCNC: 107 MG/DL (ref 70–99)
GLUCOSE BLD-MCNC: 98 MG/DL (ref 70–99)
GLUCOSE SERPL-MCNC: 101 MG/DL (ref 70–99)
HCT VFR BLD AUTO: 31 % (ref 36–48)
HGB BLD-MCNC: 9.9 G/DL (ref 12–16)
LV EF: 55 %
LVEF MODALITY: NORMAL
MCH RBC QN AUTO: 25.4 PG (ref 26–34)
MCHC RBC AUTO-ENTMCNC: 31.8 G/DL (ref 31–36)
MCV RBC AUTO: 79.9 FL (ref 80–100)
PERFORMED ON: ABNORMAL
PERFORMED ON: NORMAL
PLATELET # BLD AUTO: 231 K/UL (ref 135–450)
PMV BLD AUTO: 7.9 FL (ref 5–10.5)
POTASSIUM SERPL-SCNC: 4 MMOL/L (ref 3.5–5.1)
RBC # BLD AUTO: 3.88 M/UL (ref 4–5.2)
SODIUM SERPL-SCNC: 135 MMOL/L (ref 136–145)
WBC # BLD AUTO: 7.5 K/UL (ref 4–11)

## 2023-07-05 PROCEDURE — 92960 CARDIOVERSION ELECTRIC EXT: CPT | Performed by: INTERNAL MEDICINE

## 2023-07-05 PROCEDURE — 85027 COMPLETE CBC AUTOMATED: CPT

## 2023-07-05 PROCEDURE — 7100000011 HC PHASE II RECOVERY - ADDTL 15 MIN: Performed by: INTERNAL MEDICINE

## 2023-07-05 PROCEDURE — 36415 COLL VENOUS BLD VENIPUNCTURE: CPT

## 2023-07-05 PROCEDURE — 5A2204Z RESTORATION OF CARDIAC RHYTHM, SINGLE: ICD-10-PCS | Performed by: INTERNAL MEDICINE

## 2023-07-05 PROCEDURE — 6360000002 HC RX W HCPCS: Performed by: NURSE ANESTHETIST, CERTIFIED REGISTERED

## 2023-07-05 PROCEDURE — 6360000002 HC RX W HCPCS: Performed by: INTERNAL MEDICINE

## 2023-07-05 PROCEDURE — 80048 BASIC METABOLIC PNL TOTAL CA: CPT

## 2023-07-05 PROCEDURE — 6370000000 HC RX 637 (ALT 250 FOR IP): Performed by: PEDIATRICS

## 2023-07-05 PROCEDURE — 93005 ELECTROCARDIOGRAM TRACING: CPT | Performed by: INTERNAL MEDICINE

## 2023-07-05 PROCEDURE — 2709999900 HC NON-CHARGEABLE SUPPLY: Performed by: INTERNAL MEDICINE

## 2023-07-05 PROCEDURE — 2580000003 HC RX 258: Performed by: PEDIATRICS

## 2023-07-05 PROCEDURE — 3700000000 HC ANESTHESIA ATTENDED CARE: Performed by: INTERNAL MEDICINE

## 2023-07-05 PROCEDURE — B24BZZ4 ULTRASONOGRAPHY OF HEART WITH AORTA, TRANSESOPHAGEAL: ICD-10-PCS | Performed by: INTERNAL MEDICINE

## 2023-07-05 PROCEDURE — 93306 TTE W/DOPPLER COMPLETE: CPT

## 2023-07-05 PROCEDURE — 7100000010 HC PHASE II RECOVERY - FIRST 15 MIN: Performed by: INTERNAL MEDICINE

## 2023-07-05 PROCEDURE — 2580000003 HC RX 258: Performed by: INTERNAL MEDICINE

## 2023-07-05 PROCEDURE — 99232 SBSQ HOSP IP/OBS MODERATE 35: CPT | Performed by: INTERNAL MEDICINE

## 2023-07-05 PROCEDURE — 99238 HOSP IP/OBS DSCHRG MGMT 30/<: CPT | Performed by: INTERNAL MEDICINE

## 2023-07-05 PROCEDURE — 6370000000 HC RX 637 (ALT 250 FOR IP): Performed by: INTERNAL MEDICINE

## 2023-07-05 PROCEDURE — 93010 ELECTROCARDIOGRAM REPORT: CPT | Performed by: INTERNAL MEDICINE

## 2023-07-05 PROCEDURE — 3700000001 HC ADD 15 MINUTES (ANESTHESIA): Performed by: INTERNAL MEDICINE

## 2023-07-05 RX ORDER — SODIUM CHLORIDE 0.9 % (FLUSH) 0.9 %
5-40 SYRINGE (ML) INJECTION PRN
Status: DISCONTINUED | OUTPATIENT
Start: 2023-07-05 | End: 2023-07-05 | Stop reason: SDUPTHER

## 2023-07-05 RX ORDER — LABETALOL HYDROCHLORIDE 5 MG/ML
10 INJECTION, SOLUTION INTRAVENOUS
Status: DISCONTINUED | OUTPATIENT
Start: 2023-07-05 | End: 2023-07-05 | Stop reason: HOSPADM

## 2023-07-05 RX ORDER — SODIUM CHLORIDE 9 MG/ML
INJECTION, SOLUTION INTRAVENOUS PRN
Status: DISCONTINUED | OUTPATIENT
Start: 2023-07-05 | End: 2023-07-05 | Stop reason: SDUPTHER

## 2023-07-05 RX ORDER — OXYCODONE HYDROCHLORIDE 5 MG/1
5 TABLET ORAL PRN
Status: DISCONTINUED | OUTPATIENT
Start: 2023-07-05 | End: 2023-07-05 | Stop reason: HOSPADM

## 2023-07-05 RX ORDER — SODIUM CHLORIDE 0.9 % (FLUSH) 0.9 %
5-40 SYRINGE (ML) INJECTION PRN
OUTPATIENT
Start: 2023-07-05

## 2023-07-05 RX ORDER — SODIUM CHLORIDE 0.9 % (FLUSH) 0.9 %
5-40 SYRINGE (ML) INJECTION EVERY 12 HOURS SCHEDULED
Status: DISCONTINUED | OUTPATIENT
Start: 2023-07-05 | End: 2023-07-05 | Stop reason: SDUPTHER

## 2023-07-05 RX ORDER — DIPHENHYDRAMINE HYDROCHLORIDE 50 MG/ML
12.5 INJECTION INTRAMUSCULAR; INTRAVENOUS
Status: DISCONTINUED | OUTPATIENT
Start: 2023-07-05 | End: 2023-07-05 | Stop reason: HOSPADM

## 2023-07-05 RX ORDER — CEFUROXIME AXETIL 500 MG/1
500 TABLET ORAL 2 TIMES DAILY
Qty: 10 TABLET | Refills: 0 | Status: SHIPPED | OUTPATIENT
Start: 2023-07-05 | End: 2023-07-10

## 2023-07-05 RX ORDER — OXYCODONE HYDROCHLORIDE 5 MG/1
10 TABLET ORAL PRN
Status: DISCONTINUED | OUTPATIENT
Start: 2023-07-05 | End: 2023-07-05 | Stop reason: HOSPADM

## 2023-07-05 RX ORDER — PROPOFOL 10 MG/ML
INJECTION, EMULSION INTRAVENOUS PRN
Status: DISCONTINUED | OUTPATIENT
Start: 2023-07-05 | End: 2023-07-05 | Stop reason: SDUPTHER

## 2023-07-05 RX ORDER — SODIUM CHLORIDE 9 MG/ML
INJECTION, SOLUTION INTRAVENOUS PRN
OUTPATIENT
Start: 2023-07-05

## 2023-07-05 RX ORDER — SODIUM CHLORIDE 0.9 % (FLUSH) 0.9 %
5-40 SYRINGE (ML) INJECTION EVERY 12 HOURS SCHEDULED
OUTPATIENT
Start: 2023-07-05

## 2023-07-05 RX ORDER — ONDANSETRON 2 MG/ML
4 INJECTION INTRAMUSCULAR; INTRAVENOUS
Status: DISCONTINUED | OUTPATIENT
Start: 2023-07-05 | End: 2023-07-05 | Stop reason: HOSPADM

## 2023-07-05 RX ADMIN — FLUOXETINE 40 MG: 20 CAPSULE ORAL at 08:15

## 2023-07-05 RX ADMIN — CETIRIZINE HYDROCHLORIDE 10 MG: 10 TABLET ORAL at 08:15

## 2023-07-05 RX ADMIN — PROPAFENONE HYDROCHLORIDE 150 MG: 150 TABLET, FILM COATED ORAL at 08:15

## 2023-07-05 RX ADMIN — OXYBUTYNIN CHLORIDE 5 MG: 5 TABLET ORAL at 08:16

## 2023-07-05 RX ADMIN — Medication 10 ML: at 08:17

## 2023-07-05 RX ADMIN — PANTOPRAZOLE SODIUM 40 MG: 40 TABLET, DELAYED RELEASE ORAL at 05:12

## 2023-07-05 RX ADMIN — PROPOFOL 80 MG: 10 INJECTION, EMULSION INTRAVENOUS at 09:12

## 2023-07-05 RX ADMIN — ATORVASTATIN CALCIUM 40 MG: 40 TABLET, FILM COATED ORAL at 08:15

## 2023-07-05 RX ADMIN — OXYBUTYNIN CHLORIDE 5 MG: 5 TABLET ORAL at 12:58

## 2023-07-05 RX ADMIN — ACETAMINOPHEN 650 MG: 325 TABLET ORAL at 12:58

## 2023-07-05 RX ADMIN — LEVOFLOXACIN 750 MG: 750 TABLET, FILM COATED ORAL at 08:16

## 2023-07-05 RX ADMIN — APIXABAN 5 MG: 5 TABLET, FILM COATED ORAL at 08:16

## 2023-07-05 RX ADMIN — IRON SUCROSE 200 MG: 20 INJECTION, SOLUTION INTRAVENOUS at 11:27

## 2023-07-05 ASSESSMENT — PAIN SCALES - GENERAL
PAINLEVEL_OUTOF10: 5
PAINLEVEL_OUTOF10: 0
PAINLEVEL_OUTOF10: 0

## 2023-07-05 ASSESSMENT — PAIN - FUNCTIONAL ASSESSMENT: PAIN_FUNCTIONAL_ASSESSMENT: NONE - DENIES PAIN

## 2023-07-05 ASSESSMENT — ENCOUNTER SYMPTOMS: SHORTNESS OF BREATH: 1

## 2023-07-05 ASSESSMENT — PAIN DESCRIPTION - DESCRIPTORS: DESCRIPTORS: ACHING

## 2023-07-05 ASSESSMENT — PAIN DESCRIPTION - LOCATION: LOCATION: HEAD

## 2023-07-05 ASSESSMENT — PAIN DESCRIPTION - ORIENTATION: ORIENTATION: ANTERIOR

## 2023-07-05 NOTE — OP NOTE
OP NOTE  Procedure direct current cardioversion  Indication atrial fibrillations  Sedation proprofol by anesthesia  Detail  Informed consent obtained  A single biphasic shock of 120Joules applied and converted to NSR with premature atrial contractions. Patient recovering in surgery recovery area. 12 lead EKG to follow .   Home today after a couple of hours if stable   Followup with Dr Lennie Tobar who is her primary cardiologist.

## 2023-07-05 NOTE — PLAN OF CARE
Problem: Discharge Planning  Goal: Discharge to home or other facility with appropriate resources  7/4/2023 0903 by Tammy Trejo RN  Outcome: Progressing  7/4/2023 0444 by Davida Dickerson RN  Outcome: Progressing     Problem: ABCDS Injury Assessment  Goal: Absence of physical injury  7/4/2023 0903 by Tammy Trejo RN  Outcome: Progressing  7/4/2023 0444 by Davida Dickerson RN  Outcome: Progressing
Problem: Discharge Planning  Goal: Discharge to home or other facility with appropriate resources  7/5/2023 1523 by Kale Lopez RN  Outcome: Adequate for Discharge  7/5/2023 1109 by Zahra Dias, RN  Outcome: Progressing  Flowsheets (Taken 7/5/2023 1109)  Discharge to home or other facility with appropriate resources:   Identify barriers to discharge with patient and caregiver   Arrange for needed discharge resources and transportation as appropriate  7/5/2023 0934 by Zahra Dias, RN  Outcome: Progressing  Flowsheets (Taken 7/5/2023 0934)  Discharge to home or other facility with appropriate resources:   Identify barriers to discharge with patient and caregiver   Arrange for needed discharge resources and transportation as appropriate
Problem: Discharge Planning  Goal: Discharge to home or other facility with appropriate resources  Outcome: Progressing     Problem: ABCDS Injury Assessment  Goal: Absence of physical injury  Outcome: Progressing
Problem: Discharge Planning  Goal: Discharge to home or other facility with appropriate resources  Outcome: Progressing  Flowsheets (Taken 7/5/2023 0934)  Discharge to home or other facility with appropriate resources:   Identify barriers to discharge with patient and caregiver   Arrange for needed discharge resources and transportation as appropriate     Problem: Chronic Conditions and Co-morbidities  Goal: Patient's chronic conditions and co-morbidity symptoms are monitored and maintained or improved  Outcome: Progressing  Flowsheets (Taken 7/5/2023 0934)  Care Plan - Patient's Chronic Conditions and Co-Morbidity Symptoms are Monitored and Maintained or Improved: Monitor and assess patient's chronic conditions and comorbid symptoms for stability, deterioration, or improvement
baseline levels established preoperatively. 23.  The patient/caregiver demonstrates knowledge of the expected responses to the operative or invasive procedure. 20.  Patient/Caregiver has reduced anxiety. Interventions- Familiarize with environment and equipment.   21. Other:  22. Other:

## 2023-07-05 NOTE — ANESTHESIA PRE PROCEDURE
Department of Anesthesiology  Preprocedure Note       Name:  Monika Matamoros   Age:  78 y.o.  :  1945                                          MRN:  [id number]         Date:  2023      Surgeon: Edyta Del Real):  Irene Dias MD    Procedure: Procedure(s):  CARDIOVERSION W/ANES. Medications prior to admission:   Prior to Admission medications    Medication Sig Start Date End Date Taking? Authorizing Provider   Cholecalciferol (VITAMIN D) 50 MCG ( UT) CAPS capsule Take 1 capsule by mouth daily (before dinner) For vitamin D 23   IFTIKHAR Mccain CNP   ferrous sulfate (IRON 325) 325 (65 Fe) MG tablet Take 1 tablet by mouth daily (with breakfast)  Patient not taking: Reported on 7/3/2023 5/22/23   IFTIKHAR Mccain CNP   metoprolol succinate (TOPROL XL) 100 MG extended release tablet Take 1 tablet by mouth every evening 23   Historical Provider, MD   acetaminophen (TYLENOL) 500 MG tablet Take 1 tablet by mouth every 6 hours as needed for Pain    Historical Provider, MD   oxybutynin (DITROPAN) 5 MG tablet Take 1 tablet by mouth 3 times daily 5/16/23 7/3/23  IFTIKHAR Mccain CNP   FLUoxetine (PROZAC) 40 MG capsule Take 1 capsule by mouth daily 23   [facility]   atorvastatin (LIPITOR) 40 MG tablet Take 1 tablet by mouth daily 23   Lauren KathyIFTIKHAR blanco CNP   omeprazole (PRILOSEC) 40 MG delayed release capsule Take 1 capsule by mouth daily 23   IFTIKHAR Mccain CNP   methocarbamol (ROBAXIN) 500 MG tablet Take 1 tablet by mouth 4 times daily 23  IFTIKHAR Mccain CNP   cetirizine (ZYRTEC) 10 MG tablet Take 1 tablet by mouth daily    Historical Provider, MD   propafenone (RYTHMOL) 150 MG tablet Take 1 tablet by mouth in the morning and 1 tablet at noon and 1 tablet in the evening.     Historical Provider, MD   ELIQUBRYANNA 5 MG TABS tablet Take 1 tablet by mouth 2 times daily  Patient taking differently: Take 1 tablet by mouth 2 times

## 2023-07-05 NOTE — PROGRESS NOTES
Bedside report and transfer of care given to Terri Jorgensen RN. Pt currently resting in bed with the call light within reach. Pt denies any other care needs at this time. Pt stable at this time.

## 2023-07-05 NOTE — PROGRESS NOTES
Shift assessment is complete, see flow sheet. Patient refused senna stating she had multiple soft stool today. Patient denies c/o pain at this time. Drink given to pt per request and pt denies further needs at this time. Pt A&OX4 with call light within reach.

## 2023-07-05 NOTE — DISCHARGE SUMMARY
Name:  Thang Ramos  Room:  /0600-14  MRN:    [id number]    IM Discharge Summary    Discharging Physician:  Dayna Brumfield MD    Admit: 7/3/2023    Discharge:      PCP      IFTIKHAR Cabrera - CNP    Diagnoses and hospital course  this Admission         Presyncope, chest pain, atrial fibrillation:  - presenting TNI negative x2  - denies chest pain / pressure at this time   - telemetry with Afibb and high rates likely cause of her symptoms   - improved with rate control        Afibb with RVR  - had hx of Cardioversion 3 yrs ago and high rates x 3 weeks with symptoms per pt  - metoprolol 100 mg po qhs   - propafenone 150 mg PO TID   - eliquis 5 mg op BID   - cardiology consulted  - rates improved with prn BB and had  cardioversion done with successful restoration of NSR   - pt to f/w [facility] as planned      Right small pleural effusion concerning for pneumonia: - likely gram positive organism   - with cough, she reports feeling febrile at home but when she would check not finding one -  - stable on RA   - check procalcitonin - wnl  - given rocephin and z pack - change to omnicef at dc     Back pain:   - has been limiting her mobility at home and making it hard to care for self   - lidocaine patch (she reports trying these at [facility] but struggled with putting them on her own back)   - PT/OT   - acetaminophen prn      Hypokalemia:   - replaced prn      Hypomagnesemia:   - replaced prn            DM2: not on any meds  - hx noted, has had a Hgb A1C > 6.5 since Sept 2018      Normocytic anemia: iron def  - evidence of iron deficiency on work up 5/16/23   - iron 325 mg po daily (she reports not taking as constipating, so added senna qhs)   - given venofer IV x 2 doses           TAMARA:   - cont PPI         Mood disorder:   - fluoxetine 40 mg po daily      Overactive bladder:   - oxybutynin 5 mg po TID        HPI   Sophia Khan is a 78 y.o. female with pmh of atrial fibrillation who presents with report of

## 2023-07-05 NOTE — DISCHARGE INSTR - DIET
Good nutrition is important when healing from an illness, injury, or surgery. Follow any nutrition recommendations given to you during your hospital stay. If you were given an oral nutrition supplement while in the hospital, continue to take this supplement at home. You can take it with meals, in-between meals, and/or before bedtime. These supplements can be purchased at most local grocery stores, pharmacies, and chain [employer]-stores. If you have any questions about your diet or nutrition, call the hospital and ask for the dietitian. Zyclara Counseling:  I discussed with the patient the risks of imiquimod including but not limited to erythema, scaling, itching, weeping, crusting, and pain.  Patient understands that the inflammatory response to imiquimod is variable from person to person and was educated regarded proper titration schedule.  If flu-like symptoms develop, patient knows to discontinue the medication and contact us.

## 2023-07-05 NOTE — PROGRESS NOTES
Patient educated on discharge instructions as well as new medications use, dosage, administration and possible side effects. Patient verified knowledge. IV removed without difficulty and dry dressing in place. Telemetry monitor removed and returned to [facility]. Pt left facility in stable condition to Home with all of their personal belongings.

## 2023-07-05 NOTE — PROGRESS NOTES
Transport here to take pt back to room. Pt in stable condition at this time. Pt denies pain or other needs.

## 2023-07-05 NOTE — ANESTHESIA POSTPROCEDURE EVALUATION
Department of Anesthesiology  Postprocedure Note    Patient: Vineet Aceves  MRN: [id number]  YOB: 1945  Date of evaluation: 7/5/2023      Procedure Summary     Date: 07/05/23 Room / Location: [address] [place] [address]'[facility]    Anesthesia Start: 1750 Anesthesia Stop: 5631    Procedure: CARDIOVERSION W/ANES. Diagnosis:       Atrial fibrillation, unspecified type ([address])      (Atrial fibrillation, unspecified type ([address]) [I48.91])    Surgeons: Coreen Severance, MD Responsible Provider: Chace Beckford MD    Anesthesia Type: MAC ASA Status: 3          Anesthesia Type: No value filed. Gerson Phase I: Gerson Score: 10    Gerson Phase II: Gerson Score: 10      Anesthesia Post Evaluation    Patient location during evaluation: PACU  Patient participation: complete - patient participated  Level of consciousness: awake and alert  Airway patency: patent  Nausea & Vomiting: no nausea and no vomiting  Complications: no  Cardiovascular status: blood pressure returned to baseline  Respiratory status: acceptable  Hydration status: euvolemic  Comments: VSS on transfer to phase 2 recovery. No anesthetic complications.

## 2023-07-06 ENCOUNTER — CARE COORDINATION (OUTPATIENT)
Dept: CASE MANAGEMENT | Age: 78
End: 2023-07-06

## 2023-07-06 NOTE — CARE COORDINATION
[address] [address] [facility],[place] [address] Care Transitions Initial Follow Up Call    Call within 2 business days of discharge: Yes      Patient: Adriana Arroyo Patient : 1945   MRN: [id number]  Reason for Admission: 2 days -> presyncope, chest pain, A Fib with RVR on Eliquis, s/p cardioversion , R small pleural effusion concerning for PNA, back pain, hypokalemia, hypoMg, DM2 (A1c 6.1%), normocytic anemia: iron deficiency s/p venofer IV x 2 doses, GERD, mood disorder, overactive bladder -> home no services  Discharge Date: 23 RARS: Readmission Risk Score: 11.4      Last Discharge [address] [facility],[place] [address] Facility       Date Complaint Diagnosis Description Type Department Provider    7/3/23 Shortness of Breath Chest pain, unspecified type . .. ED to Hosp-Admission (Discharged) (ADMITTED) [facility] PCU Lissett Chairez MD; Thuy Kidney...     1st attempt - CTN attempted follow-up outreach to patient. Message left including CTN contact information.      Follow Up  Future Appointments   Date Time Provider [address]   2023  1:00 PM Adamaris Sena APRN - CNP [place] [address] José Richards RN  Care Transition Nurse  [phone] mobile

## 2023-07-07 ENCOUNTER — CARE COORDINATION (OUTPATIENT)
Dept: CASE MANAGEMENT | Age: 78
End: 2023-07-07

## 2023-07-07 NOTE — CARE COORDINATION
[address] [address] [facility],[place] [address] Care Transitions Initial Follow Up Call    Call within 2 business days of discharge: Yes      Patient: Estuardo Lopez Patient : 1945   MRN: [id number]  Reason for Admission: 2 days -> presyncope, chest pain, A Fib with RVR on Eliquis, s/p cardioversion , R small pleural effusion concerning for PNA, back pain, hypokalemia, hypoMg, DM2 (A1c 6.1%), normocytic anemia: iron deficiency s/p venofer IV x 2 doses, GERD, mood disorder, overactive bladder -> home no services  Discharge Date: 23 RARS: Readmission Risk Score: 11.4      Last Discharge [address] [facility],[place] Facility       Date Complaint Diagnosis Description Type Department Provider    7/3/23 Shortness of Breath Chest pain, unspecified type . .. ED to Hosp-Admission (Discharged) (ADMITTED) [facility] PCU Evgeny Núñez MD; Linda Emerson...     2nd attempt - CTN attempted follow-up outreach to patient. Message left including CTN contact information. No further CTN outreach scheduled. Episode resolved.     Follow Up  Future Appointments   Date Time Provider [address]   2023  1:00 PM IFTIKHAR Pulido - CNP [place] [address] José Parsons RN  Care Transition Nurse  [phone] mobile

## 2023-07-10 DIAGNOSIS — F41.9 ANXIETY: ICD-10-CM

## 2023-07-11 RX ORDER — FLUOXETINE HYDROCHLORIDE 40 MG/1
40 CAPSULE ORAL DAILY
Qty: 90 CAPSULE | Refills: 3 | Status: SHIPPED | OUTPATIENT
Start: 2023-07-11

## 2023-07-20 ENCOUNTER — CARE COORDINATION (OUTPATIENT)
Dept: CARE COORDINATION | Age: 78
End: 2023-07-20

## 2023-07-20 ENCOUNTER — OFFICE VISIT (OUTPATIENT)
Dept: FAMILY MEDICINE CLINIC | Age: 78
End: 2023-07-20

## 2023-07-20 VITALS
TEMPERATURE: 97.7 F | BODY MASS INDEX: 18.69 KG/M2 | WEIGHT: 134 LBS | DIASTOLIC BLOOD PRESSURE: 82 MMHG | OXYGEN SATURATION: 98 % | HEART RATE: 94 BPM | SYSTOLIC BLOOD PRESSURE: 122 MMHG

## 2023-07-20 DIAGNOSIS — Z09 HOSPITAL DISCHARGE FOLLOW-UP: Primary | ICD-10-CM

## 2023-07-20 DIAGNOSIS — J90 PLEURAL EFFUSION: ICD-10-CM

## 2023-07-20 DIAGNOSIS — D50.9 IRON DEFICIENCY ANEMIA, UNSPECIFIED IRON DEFICIENCY ANEMIA TYPE: ICD-10-CM

## 2023-07-20 DIAGNOSIS — I48.0 PAROXYSMAL A-FIB (HCC): ICD-10-CM

## 2023-07-20 DIAGNOSIS — M79.89 LEFT LEG SWELLING: ICD-10-CM

## 2023-07-20 PROBLEM — D68.69 SECONDARY HYPERCOAGULABLE STATE (HCC): Status: RESOLVED | Noted: 2023-05-16 | Resolved: 2023-07-20

## 2023-07-20 NOTE — PROGRESS NOTES
Post-Discharge Transitional Care  Follow Up      Arlette Galeano   YOB: 1945    Date of Office Visit:  7/20/2023  Date of Hospital Admission: 7/3/23  Date of Hospital Discharge: 7/5/23  Risk of hospital readmission (high >=14%. Medium >=10%) :Readmission Risk Score: 11.4      Care management risk score Rising risk (score 2-5) and Complex Care (Scores >=6): No Risk Score On File     Non face to face  following discharge, date last encounter closed (first attempt may have been earlier): *No documented post hospital discharge outreach found in the last 14 days    Call initiated 2 business days of discharge: *No response recorded in the last 14 days    ASSESSMENT/PLAN:   Hospital discharge follow-up  Discharge summary reviewed, medication list update  -     WI DISCHARGE MEDS RECONCILED W/ CURRENT OUTPATIENT MED LIST  Left leg swelling  Due to new left leg swelling and history of afib concern for possible DVT. Encouraged to continue Eliquis 5 mg BID. Will obtain LLE US to r/o DVT. Will refer patient to  to assist with transportation.    -     VL DUP LOWER EXTREMITY VENOUS LEFT; Future  Paroxysmal A-fib [facility])  Concern for possible continued symptomatic afib despite recent Cardioversion. Continue Metoprolol 100 mg, Propafenone 150 mg TID  Encouraged to make updated appt with Dr. Reyes Contreras at [facility] to discuss further testing. Will need transportation assistance- will discuss with .    -     VL DUP LOWER EXTREMITY VENOUS LEFT; Future  Iron deficiency anemia, unspecified iron deficiency anemia type  S/p Venofer IV x 2 doses. Not tolerating oral iron at this time- causing constipation  Will recheck CBC in 3 months. Consider referral to [facility] for continued iron transfusions. Pleural effusion  Possible pneumonia. Has completed antibiotics. Consider updated imaging in 4 weeks.       Medical Decision Making: straightforward and moderate complexity  Return in about 4 weeks (around

## 2023-07-20 NOTE — CARE COORDINATION
Ambulatory Care Coordination Note  2023    Patient Current Location:  Home: [address]  [address] [address]    ACM contacted the patient by telephone. Verified name and  with patient as identifiers. Provided introduction to self, and explanation of the ACM role. ACM: Kary Pettit RN    Challenges to be reviewed by the provider   Additional needs identified to be addressed with provider: No  none    This ACM contacted patient's [address]  Patient qualifies for their [employer] Transportation Program-They offer 24 one-way trips per 90 days following an inpatient discharge. The benefit resets with each discharge. The member has to be the one to call; this ACM provided the contact number. Informed patient the  recommended she call today or as soon as possible to schedule because she may require prior approval if over 35 miles. Instructed patient to have address ready when making appointment    PLAN:  Patient scheduled appointment with cardiologist 23 at 10:30 AM             Method of communication with provider: chart routing. Introduced role of ACM and purpose of call  Patient denies need for hospital to home program  Patient reports she is independent with all of her ADL's and declines need for MOW/assistance  Patient reports her main concern is transportation to her cardiologist office  Patient's cardiologist office is located in [facility]  Discussed potential options  Patient is a member of a [employer]; she does not want to ask anyone from [employer] at this time.   Unfortunately [employer] is only available for [employer] and [employer] charges over $3.00+ per mile if non-medicaid for out of county  Patient reports she still drives however she reports her car is not reliable enough to drive to [facility]  Patient reports she was very happy with the heart doctors she saw at [facility] and she verbalizes she would consider changing

## 2023-07-22 ENCOUNTER — PATIENT MESSAGE (OUTPATIENT)
Dept: FAMILY MEDICINE CLINIC | Age: 78
End: 2023-07-22
Payer: MEDICARE

## 2023-07-22 DIAGNOSIS — M79.89 LEFT LEG SWELLING: Primary | ICD-10-CM

## 2023-07-22 PROCEDURE — 93970 EXTREMITY STUDY: CPT | Performed by: NURSE PRACTITIONER

## 2023-07-24 ENCOUNTER — TELEPHONE (OUTPATIENT)
Dept: FAMILY MEDICINE CLINIC | Age: 78
End: 2023-07-24

## 2023-07-24 NOTE — TELEPHONE ENCOUNTER
Patient states her leg is not swollen anymore and does not hurt so she wants to know if she even needs it. She would like to just not do the test any longer. Would that be ok?   I have her scheduled for 7/26 at 10 am at [facility]

## 2023-07-24 NOTE — TELEPHONE ENCOUNTER
Can someone call scheduling and assist her with scheduling the lower extremity duplex please. Make sure to see how she is getting there- she has had some issues with transportation and may need at least 2 days before the test to set up transportation.

## 2023-07-24 NOTE — TELEPHONE ENCOUNTER
Spoke w/ provider - recommend printing out current order adding STAT - faxing to department for scheduling.  Placing ticket with [facility] to verify why code is not available in the system

## 2023-07-24 NOTE — TELEPHONE ENCOUNTER
From: Misti Newer  To: Callie Mask  Sent: 7/22/2023 3:56 PM EDT  Subject: Order for test of left leg     Alexeygloria Sallie Kocher  I had difficulty scheduling this test. Every time I contacted customer pre-services I was told the [facility]) was not answering. I don't know what to do. [address] is too far. Is here another option? Can you set this up for me?  Thank you  Jeryl Cheadle

## 2023-07-24 NOTE — TELEPHONE ENCOUNTER
Spoke with central scheduling, states that since it was ordered STAT on 7/20/23, that to get her in any sooner than 8/17/23 at [facility] that a NEW STAT order needs to be placed today.

## 2023-07-25 ENCOUNTER — CARE COORDINATION (OUTPATIENT)
Dept: CARE COORDINATION | Age: 78
End: 2023-07-25

## 2023-07-25 NOTE — CARE COORDINATION
Patient reports she canceled the 7/26/23 appointment with cardiology   Patient further reports she scheduled a doppler study for 7/26/23 and had to cancel the cardiology appointment    Patient informs this ACM she does not have transportation for the doppler appointment   Inquired if patient had contacted the [facility] when we spoke last week  The patient did not initially recall this transportation option  Reminded patient the program is offered by her insurance company and can be utilized 90 days after an inpatient discharge  Patient reports she now remembers talking about this but she did not scheduled the appointment. Patient had planned on contacting S today to schedule transportation for tomorrow  Reminded patient that transportation companies usually require a 2-3 day notice to schedule appointments  Further explained the [facility] transportation charges per mile    Offered to assist patient with scheduling transportation    This ACM contacted [employer] [place] with [facility] for three way call with patient  They inform the patient they usually require a three day notice to schedule appointment  Patient will also require a prior authorization since the appointment is more than 35 miles from her home  The [employer] rep contacted her supervisor and they are going to try and expedite the process  They will let the patient know later today if the transportation is approved for tomorrow    Patient reports she has a car but doesn't like to drive long distances. She reports she will drive to the appointment if she is not able to get a ride. Patient further reports she will reschedule the cardiology appointment and schedule the transportation as soon as possible.   Provided contact number for [place] [phone]  Patient reports she has the [facility] contact number

## 2023-07-25 NOTE — CARE COORDINATION
Thank you for the update. I hope she is able to reschedule the Cardiology appt. Anemia due to stage 5 chronic kidney disease, not on chronic dialysis    CAP (community acquired pneumonia)  6/18  Cerebrovascular accident (CVA), unspecified mechanism  diagnosed via CT of the head  Coronary artery disease    Diabetes mellitus  type 2  ESRD (end stage renal disease)  on Dialysis( M/W/F), By Dr. Huff  GERD (gastroesophageal reflux disease)    HTN (hypertension)    Hypercholesterolemia    Stented coronary artery  2014, 3 stents, [facility]

## 2023-07-26 ENCOUNTER — HOSPITAL ENCOUNTER (OUTPATIENT)
Dept: VASCULAR LAB | Age: 78
Discharge: HOME | End: 2023-07-26
Payer: MEDICARE

## 2023-07-26 DIAGNOSIS — M79.89 LEFT LEG SWELLING: ICD-10-CM

## 2023-07-26 DIAGNOSIS — I48.0 PAROXYSMAL A-FIB (HCC): ICD-10-CM

## 2023-07-26 PROCEDURE — 93971 EXTREMITY STUDY: CPT

## 2023-07-27 NOTE — RESULT ENCOUNTER NOTE
Fine to wait to see Cardiology on 8/10. Discuss leg swelling with them and see if they recommend updated testing.

## 2023-07-27 NOTE — RESULT ENCOUNTER NOTE
No blood clot in left leg which is good news    So leg swelling could be caused from arthritis. Issues with the arteries (which were not checked on this particular study). Lets have you talk about this with Cardiology and get their thoughts on next steps.

## 2023-07-31 ENCOUNTER — CARE COORDINATION (OUTPATIENT)
Dept: CARE COORDINATION | Age: 78
End: 2023-07-31

## 2023-07-31 NOTE — CARE COORDINATION
Ambulatory Care Coordination Note  2023    Patient Current Location:  Home: [address]  [address] [address]     ACM contacted the patient by telephone. Verified name and  with patient as identifiers. Provided introduction to self, and explanation of the ACM role. Challenges to be reviewed by the provider   Additional needs identified to be addressed with provider: No  none    PLAN:  Patient is keeping her appointment with her current cardiologist 8/10/23  Patient agrees to contact the [facility] appointment line today in order to schedule transportation to the 8/10/23 cardiology appointment. Assisted patient with scheduling new patient appointment with Dr. Tha Purcell 10/18/23           Method of communication with provider: chart routing. ACM: Rosendo Elizabeth RN    Patient reports she utilized the [employer] transportation services for her doppler studies appointment and it went well  Patient is scheduled with [facility] cardiologist 8/10/23  Patient reports she has been considering changing to a cardiologist that is closer to her home. Discussed importance of keeping the 8/10/23 appointment with her current cardiologist in addition to scheduling new patient appointment with a cardiologist in [facility]; patient agreed    Offered patient enrollment in the Remote Patient Monitoring (RPM) program for in-home monitoring: Patient declined. Care Coordination Interventions    Referral from Primary Care Provider: Yes  Suggested Interventions and [employer] on Wheels: Declined  Senior Services: Declined  Transportation Support:  In Process          Goals Addressed    None         Future Appointments   Date Time Provider [address]   2023 11:00 AM Brian Efren, APRN - CNP [place] [facility] [place] [facility]   2023  1:00 PM Brian Efren, APRN - CNP [place] [facility] [place] [facility]

## 2023-08-17 ENCOUNTER — CARE COORDINATION (OUTPATIENT)
Dept: CARE COORDINATION | Age: 78
End: 2023-08-17

## 2023-08-17 ENCOUNTER — OFFICE VISIT (OUTPATIENT)
Dept: FAMILY MEDICINE CLINIC | Age: 78
End: 2023-08-17
Payer: MEDICARE

## 2023-08-17 VITALS
HEART RATE: 74 BPM | SYSTOLIC BLOOD PRESSURE: 120 MMHG | TEMPERATURE: 97.6 F | WEIGHT: 128 LBS | DIASTOLIC BLOOD PRESSURE: 68 MMHG | BODY MASS INDEX: 17.85 KG/M2 | OXYGEN SATURATION: 96 %

## 2023-08-17 DIAGNOSIS — I48.0 PAROXYSMAL A-FIB (HCC): ICD-10-CM

## 2023-08-17 DIAGNOSIS — M51.36 DEGENERATIVE DISC DISEASE, LUMBAR: ICD-10-CM

## 2023-08-17 DIAGNOSIS — D50.8 IRON DEFICIENCY ANEMIA SECONDARY TO INADEQUATE DIETARY IRON INTAKE: ICD-10-CM

## 2023-08-17 DIAGNOSIS — R06.02 SHORTNESS OF BREATH: Primary | ICD-10-CM

## 2023-08-17 PROBLEM — J18.9 PNEUMONIA DUE TO INFECTIOUS ORGANISM: Status: RESOLVED | Noted: 2023-07-04 | Resolved: 2023-08-17

## 2023-08-17 LAB
DEPRECATED RDW RBC AUTO: 18 % (ref 12.4–15.4)
HCT VFR BLD AUTO: 36 % (ref 36–48)
HGB BLD-MCNC: 11.5 G/DL (ref 12–16)
MCH RBC QN AUTO: 26.3 PG (ref 26–34)
MCHC RBC AUTO-ENTMCNC: 31.9 G/DL (ref 31–36)
MCV RBC AUTO: 82.6 FL (ref 80–100)
PLATELET # BLD AUTO: 265 K/UL (ref 135–450)
PMV BLD AUTO: 8.6 FL (ref 5–10.5)
RBC # BLD AUTO: 4.36 M/UL (ref 4–5.2)
WBC # BLD AUTO: 6.7 K/UL (ref 4–11)

## 2023-08-17 PROCEDURE — 1123F ACP DISCUSS/DSCN MKR DOCD: CPT | Performed by: NURSE PRACTITIONER

## 2023-08-17 PROCEDURE — 3078F DIAST BP <80 MM HG: CPT | Performed by: NURSE PRACTITIONER

## 2023-08-17 PROCEDURE — 36415 COLL VENOUS BLD VENIPUNCTURE: CPT | Performed by: NURSE PRACTITIONER

## 2023-08-17 PROCEDURE — 99214 OFFICE O/P EST MOD 30 MIN: CPT | Performed by: NURSE PRACTITIONER

## 2023-08-17 PROCEDURE — 3074F SYST BP LT 130 MM HG: CPT | Performed by: NURSE PRACTITIONER

## 2023-08-17 RX ORDER — TRAMADOL HYDROCHLORIDE 50 MG/1
1 TABLET ORAL 2 TIMES DAILY
COMMUNITY
Start: 2023-08-15

## 2023-08-17 ASSESSMENT — ENCOUNTER SYMPTOMS
BLOOD IN STOOL: 0
CONSTIPATION: 0
SHORTNESS OF BREATH: 0
DIARRHEA: 0
CHEST TIGHTNESS: 0
COUGH: 0

## 2023-08-17 ASSESSMENT — PATIENT HEALTH QUESTIONNAIRE - PHQ9
SUM OF ALL RESPONSES TO PHQ QUESTIONS 1-9: 2
1. LITTLE INTEREST OR PLEASURE IN DOING THINGS: 1
SUM OF ALL RESPONSES TO PHQ9 QUESTIONS 1 & 2: 2
6. FEELING BAD ABOUT YOURSELF - OR THAT YOU ARE A FAILURE OR HAVE LET YOURSELF OR YOUR FAMILY DOWN: 0
SUM OF ALL RESPONSES TO PHQ QUESTIONS 1-9: 2
5. POOR APPETITE OR OVEREATING: 0
9. THOUGHTS THAT YOU WOULD BE BETTER OFF DEAD, OR OF HURTING YOURSELF: 0
2. FEELING DOWN, DEPRESSED OR HOPELESS: 1
10. IF YOU CHECKED OFF ANY PROBLEMS, HOW DIFFICULT HAVE THESE PROBLEMS MADE IT FOR YOU TO DO YOUR WORK, TAKE CARE OF THINGS AT HOME, OR GET ALONG WITH OTHER PEOPLE: 0
7. TROUBLE CONCENTRATING ON THINGS, SUCH AS READING THE NEWSPAPER OR WATCHING TELEVISION: 0
3. TROUBLE FALLING OR STAYING ASLEEP: 0
SUM OF ALL RESPONSES TO PHQ QUESTIONS 1-9: 2
8. MOVING OR SPEAKING SO SLOWLY THAT OTHER PEOPLE COULD HAVE NOTICED. OR THE OPPOSITE, BEING SO FIGETY OR RESTLESS THAT YOU HAVE BEEN MOVING AROUND A LOT MORE THAN USUAL: 0
SUM OF ALL RESPONSES TO PHQ QUESTIONS 1-9: 2
4. FEELING TIRED OR HAVING LITTLE ENERGY: 0

## 2023-08-17 NOTE — PROGRESS NOTES
8/17/2023    Chief Complaint   Patient presents with    Cough     4 wk fu, this is better     Shortness of Breath       Collette Ngo is a 78 y.o. female, presents today for:      ASSESSMENT/PLAN:    1. Shortness of breath  Improved since last OV but still occurring. Referral to EP Cardio for opinion on Afib  Keep appt with Dr. Misti Lucero for October. Go to ER if symptoms worsen  - Lurdes Gee MD, Cardiac Electrophysiology, [facility]    2. Paroxysmal A-fib ([address])  See above  - Lurdes Gee MD, Cardiac Electrophysiology, [facility]    3. Iron deficiency anemia secondary to inadequate dietary iron intake  S/p Venofer x 2 7/2023. Updated CBC ordered today. Plan to check again in October  - CBC    4. Degenerative disc disease, lumbar  Now following with Pain Mgmt Albino Ruiz), appreciate assistance  Continue Tramadol (given by Pain Mgmt). Planning for nerve ablation later this month  - traMADol (ULTRAM) 50 MG tablet; Take 1 tablet by mouth in the morning and at bedtime. Max Daily Amount: 100 mg      Return in about 3 months (around 11/17/2023) for anemia/ afib update. Interval history/Current status:   Still having some twinges/ palpitaitons, conscious of some going on  Made f/u with Cardio, planning to schedule appt with EP Cardio  Cough has resolved  Worried about something else going on in her lungs  Leg swelling has now resolved. Hx chronic anemia. S/p venofer x2 7/2023. No recent Colonosocpy. Hx ibs, chronic diarrhea. Has not seen blood. Not tarry. Not tolerating oral iron. Received 2 units venofer- feels slightly better since receiving. Test injections 9/6. Dr Albino Ruiz. Now on tramadol. Planning for ablation if no improvemetn with injections.       PHQ Scores 8/17/2023 5/16/2023 11/15/2022 5/17/2022 11/23/2021 9/20/2021 7/7/2020   PHQ2 Score 2 0 0 1 2 1 0   PHQ9 Score 2 0 1 3 2 1 0     Interpretation of Total Score Depression Severity: 1-4 = Minimal depression,

## 2023-08-21 NOTE — RESULT ENCOUNTER NOTE
Anemia is much better from a month ago. What we were hoping to see. If she wants to take the iron tablets maybe every other day that would be ok.

## 2023-08-23 ENCOUNTER — CARE COORDINATION (OUTPATIENT)
Dept: CARE COORDINATION | Age: 78
End: 2023-08-23

## 2023-09-13 ENCOUNTER — CARE COORDINATION (OUTPATIENT)
Dept: CARE COORDINATION | Age: 78
End: 2023-09-13

## 2023-09-25 NOTE — DISCHARGE INSTRUCTIONS
THE OFFICE WILL CALL YOU TO SCHEDULE THE NEXT FOLLOW UP APPOINTMENT    RESUME USUAL DIET    RESUME USUAL ACTIVITY    RESUME USUAL MEDICATIONS    IF ANY PROBLEMS DEVELOP SUCH AS BLEEDING, LOSS OF VISION OR PAIN THAT IS NOT CONTROLLED WITH TYLENOL OR ADVIL     CALL DR. Layton Diaz  AT  [phone]

## 2023-09-27 ENCOUNTER — HOSPITAL ENCOUNTER (OUTPATIENT)
Dept: ENDOSCOPY | Age: 78
Setting detail: OUTPATIENT SURGERY
Discharge: HOME | End: 2023-09-27
Payer: MEDICARE

## 2023-09-27 VITALS
WEIGHT: 129 LBS | DIASTOLIC BLOOD PRESSURE: 97 MMHG | SYSTOLIC BLOOD PRESSURE: 148 MMHG | HEART RATE: 115 BPM | HEIGHT: 71 IN | RESPIRATION RATE: 16 BRPM | TEMPERATURE: 97.8 F | BODY MASS INDEX: 18.06 KG/M2

## 2023-09-27 PROCEDURE — 6370000000 HC RX 637 (ALT 250 FOR IP): Performed by: OPHTHALMOLOGY

## 2023-09-27 PROCEDURE — 2500000003 HC RX 250 WO HCPCS: Performed by: OPHTHALMOLOGY

## 2023-09-27 PROCEDURE — 66821 AFTER CATARACT LASER SURGERY: CPT

## 2023-09-27 RX ORDER — SOFT LENS RINSE,STORE SOLUTION
SOLUTION, NON-ORAL MISCELLANEOUS ONCE
Status: COMPLETED | OUTPATIENT
Start: 2023-09-27 | End: 2023-09-27

## 2023-09-27 RX ORDER — APRACLONIDINE HYDROCHLORIDE 5 MG/ML
1 SOLUTION/ DROPS OPHTHALMIC 2 TIMES DAILY PRN
Status: DISCONTINUED | OUTPATIENT
Start: 2023-09-27 | End: 2023-09-28 | Stop reason: HOSPADM

## 2023-09-27 RX ORDER — PHENYLEPHRINE HYDROCHLORIDE 25 MG/ML
1 SOLUTION/ DROPS OPHTHALMIC ONCE
Status: COMPLETED | OUTPATIENT
Start: 2023-09-27 | End: 2023-09-27

## 2023-09-27 RX ORDER — PROPARACAINE HYDROCHLORIDE 5 MG/ML
1 SOLUTION/ DROPS OPHTHALMIC ONCE
Status: COMPLETED | OUTPATIENT
Start: 2023-09-27 | End: 2023-09-27

## 2023-09-27 RX ORDER — TROPICAMIDE 10 MG/ML
1 SOLUTION/ DROPS OPHTHALMIC ONCE
Status: COMPLETED | OUTPATIENT
Start: 2023-09-27 | End: 2023-09-27

## 2023-09-27 RX ADMIN — APRACLONIDINE 1 DROP: 5.75 SOLUTION OPHTHALMIC at 12:19

## 2023-09-27 RX ADMIN — APRACLONIDINE 1 DROP: 5.75 SOLUTION OPHTHALMIC at 12:42

## 2023-09-27 RX ADMIN — PHENYLEPHRINE HYDROCHLORIDE 1 DROP: 25 SOLUTION/ DROPS OPHTHALMIC at 12:15

## 2023-09-27 RX ADMIN — PROPARACAINE HYDROCHLORIDE 1 DROP: 5 SOLUTION/ DROPS OPHTHALMIC at 12:24

## 2023-09-27 RX ADMIN — TROPICAMIDE 1 DROP: 10 SOLUTION/ DROPS OPHTHALMIC at 12:11

## 2023-09-27 RX ADMIN — WATER, PURIFIED: 99.05 LIQUID OPHTHALMIC at 12:38

## 2023-09-27 ASSESSMENT — PAIN SCALES - GENERAL
PAINLEVEL_OUTOF10: 0
PAINLEVEL_OUTOF10: 0

## 2023-09-27 NOTE — PROGRESS NOTES
Pt here for laser eye procedure with Dr. Inocente Hopkins  Procedure explained to pt and consent obtained  Laser eye procedure completed  See 's procedural note for details  Pt josué well  No c/o's voiced   Discharge instructions reviewed with pt and copy given  Understanding verbalized  Pt discharged to home in stable condition

## 2023-09-27 NOTE — OP NOTE
Marisabel Concepcion    Pre-operative diagnosis:  Opacified posterior capsule of the the right eye    Post-operative diagnosis:  Same    Procedure Performed:  YAG laser capsulotomy the right eye                                      Anesthesia:  Topical anesthesia     Complications:  None    Procedure:  Informed consent was obtained from the patient. Dilation drops were then applied to induce adequate mydriasis. The patient was then taken to the laser room and  positioned in front of the YAG laser. Topical Proparacaine drops were then applied. The patient then received 58 applications of 1.8 millijoules to the densely opacified posterior capsule. Once this was accomplished, a nice capsulotomy was performed. The eye was then rinsed using sterile saline. Iopidine 0.5% was then applied. The patient's pressure will be measured closely and conservatively as an outpatient in my office. Patient tolerated the procedure well without any complications.        Patient: Marisabel Concepcion  YOB: 1945  MRN: [id number]    Date of Procedure: 09/27/2023    Electronically signed by Calixto Ricardo MD on 9/27/2023 at 12:24 PM

## 2023-10-04 ENCOUNTER — CARE COORDINATION (OUTPATIENT)
Dept: CARE COORDINATION | Age: 78
End: 2023-10-04

## 2023-10-06 NOTE — DISCHARGE INSTRUCTIONS
THE OFFICE WILL CALL YOU TO SCHEDULE THE NEXT FOLLOW UP APPOINTMENT    RESUME USUAL DIET    RESUME USUAL ACTIVITY    RESUME USUAL MEDICATIONS    IF ANY PROBLEMS DEVELOP SUCH AS BLEEDING, LOSS OF VISION OR PAIN THAT IS NOT CONTROLLED WITH TYLENOL OR ADVIL    CALL DR. Serenity Toledo  AT  [phone]

## 2023-10-09 ENCOUNTER — PATIENT MESSAGE (OUTPATIENT)
Dept: FAMILY MEDICINE CLINIC | Age: 78
End: 2023-10-09

## 2023-10-10 NOTE — TELEPHONE ENCOUNTER
From: Arlette Galeano  To: Jean-Pierre Daigle  Sent: 10/9/2023 6:30 PM EDT  Subject: Harpreet Sood     I am have issues that I think indicate diabetic testing is needed. First is quite frequent urination especially at nite. Second I am still losing weight even though I am eating square meals. I am also wondering if I should be taking a magnesium supplement since I had to have an infusion in the hospital in July. My fingernails have ridges and peeling. I am available to come in except 10/11, and 12.   Thank you  Nancy Vargas

## 2023-10-11 ENCOUNTER — HOSPITAL ENCOUNTER (OUTPATIENT)
Dept: ENDOSCOPY | Age: 78
Setting detail: OUTPATIENT SURGERY
Discharge: HOME | End: 2023-10-11
Payer: MEDICARE

## 2023-10-11 VITALS
HEIGHT: 71 IN | SYSTOLIC BLOOD PRESSURE: 111 MMHG | TEMPERATURE: 97.2 F | HEART RATE: 80 BPM | BODY MASS INDEX: 18.06 KG/M2 | RESPIRATION RATE: 16 BRPM | DIASTOLIC BLOOD PRESSURE: 66 MMHG | WEIGHT: 129 LBS

## 2023-10-11 PROCEDURE — 66821 AFTER CATARACT LASER SURGERY: CPT

## 2023-10-11 PROCEDURE — 6370000000 HC RX 637 (ALT 250 FOR IP): Performed by: OPHTHALMOLOGY

## 2023-10-11 PROCEDURE — 2500000003 HC RX 250 WO HCPCS: Performed by: OPHTHALMOLOGY

## 2023-10-11 RX ORDER — TROPICAMIDE 10 MG/ML
1 SOLUTION/ DROPS OPHTHALMIC ONCE
Status: COMPLETED | OUTPATIENT
Start: 2023-10-11 | End: 2023-10-11

## 2023-10-11 RX ORDER — PHENYLEPHRINE HYDROCHLORIDE 25 MG/ML
1 SOLUTION/ DROPS OPHTHALMIC ONCE
Status: COMPLETED | OUTPATIENT
Start: 2023-10-11 | End: 2023-10-11

## 2023-10-11 RX ORDER — APRACLONIDINE HYDROCHLORIDE 5 MG/ML
1 SOLUTION/ DROPS OPHTHALMIC 2 TIMES DAILY PRN
Status: DISCONTINUED | OUTPATIENT
Start: 2023-10-11 | End: 2023-10-12 | Stop reason: HOSPADM

## 2023-10-11 RX ORDER — SOFT LENS RINSE,STORE SOLUTION
SOLUTION, NON-ORAL MISCELLANEOUS ONCE
Status: COMPLETED | OUTPATIENT
Start: 2023-10-11 | End: 2023-10-11

## 2023-10-11 RX ORDER — PROPARACAINE HYDROCHLORIDE 5 MG/ML
1 SOLUTION/ DROPS OPHTHALMIC ONCE
Status: COMPLETED | OUTPATIENT
Start: 2023-10-11 | End: 2023-10-11

## 2023-10-11 RX ADMIN — PROPARACAINE HYDROCHLORIDE 1 DROP: 5 SOLUTION/ DROPS OPHTHALMIC at 12:08

## 2023-10-11 RX ADMIN — Medication: at 12:28

## 2023-10-11 RX ADMIN — APRACLONIDINE 1 DROP: 5.75 SOLUTION OPHTHALMIC at 12:30

## 2023-10-11 RX ADMIN — TROPICAMIDE 1 DROP: 10 SOLUTION/ DROPS OPHTHALMIC at 11:52

## 2023-10-11 RX ADMIN — APRACLONIDINE 1 DROP: 5.75 SOLUTION OPHTHALMIC at 11:55

## 2023-10-11 RX ADMIN — PHENYLEPHRINE HYDROCHLORIDE 1 DROP: 25 SOLUTION/ DROPS OPHTHALMIC at 11:54

## 2023-10-11 ASSESSMENT — PAIN SCALES - GENERAL
PAINLEVEL_OUTOF10: 0
PAINLEVEL_OUTOF10: 0

## 2023-10-11 NOTE — PROGRESS NOTES
Pt here for laser eye procedure with Dr. Nika Lucas  Procedure explained to pt and consent obtained  Laser eye procedure completed  See Dr. Barry Lafleur procedural note for details  Pt josué well  No c/o's voiced   Discharge instructions reviewed with pt and copy given   Understanding verbalized  Pt discharged to home in stable condition

## 2023-10-11 NOTE — OP NOTE
Fuad Palmer    Pre-operative diagnosis:  Opacified posterior capsule of the the left eye    Post-operative diagnosis:  Same    Procedure Performed:  YAG laser capsulotomy the left eye                                      Anesthesia:  Topical anesthesia     Complications:  None    Procedure:  Informed consent was obtained from the patient. Dilation drops were then applied to induce adequate mydriasis. The patient was then taken to the laser room and  positioned in front of the YAG laser. Topical Proparacaine drops were then applied. The patient then received 44 applications of 1.8 millijoules to the densely opacified posterior capsule. Once this was accomplished, a nice capsulotomy was performed. The eye was then rinsed using sterile saline. Iopidine 0.5% was then applied. The patient's pressure will be measured closely and conservatively as an outpatient in my office. Patient tolerated the procedure well without any complications.       Patient: Fuad Palmer  YOB: 1945  MRN: [id number]    Date of Procedure: 10/11/23    Electronically signed by Ángel Hernandez MD on 10/11/2023 at 12:16 PM

## 2023-10-18 ENCOUNTER — OFFICE VISIT (OUTPATIENT)
Dept: CARDIOLOGY CLINIC | Age: 78
End: 2023-10-18
Payer: MEDICARE

## 2023-10-18 ENCOUNTER — NURSE ONLY (OUTPATIENT)
Dept: FAMILY MEDICINE CLINIC | Age: 78
End: 2023-10-18
Payer: MEDICARE

## 2023-10-18 VITALS
SYSTOLIC BLOOD PRESSURE: 116 MMHG | HEIGHT: 70 IN | HEART RATE: 75 BPM | DIASTOLIC BLOOD PRESSURE: 62 MMHG | BODY MASS INDEX: 19.24 KG/M2 | OXYGEN SATURATION: 98 % | WEIGHT: 134.4 LBS

## 2023-10-18 DIAGNOSIS — E78.00 PURE HYPERCHOLESTEROLEMIA: ICD-10-CM

## 2023-10-18 DIAGNOSIS — Z79.899 MEDICATION MANAGEMENT: ICD-10-CM

## 2023-10-18 DIAGNOSIS — N18.31 STAGE 3A CHRONIC KIDNEY DISEASE (HCC): ICD-10-CM

## 2023-10-18 DIAGNOSIS — I10 PRIMARY HYPERTENSION: ICD-10-CM

## 2023-10-18 DIAGNOSIS — I48.0 PAROXYSMAL ATRIAL FIBRILLATION (HCC): Primary | ICD-10-CM

## 2023-10-18 PROCEDURE — 3078F DIAST BP <80 MM HG: CPT | Performed by: INTERNAL MEDICINE

## 2023-10-18 PROCEDURE — 3074F SYST BP LT 130 MM HG: CPT | Performed by: INTERNAL MEDICINE

## 2023-10-18 PROCEDURE — 36415 COLL VENOUS BLD VENIPUNCTURE: CPT | Performed by: INTERNAL MEDICINE

## 2023-10-18 PROCEDURE — 93000 ELECTROCARDIOGRAM COMPLETE: CPT | Performed by: INTERNAL MEDICINE

## 2023-10-18 PROCEDURE — 1123F ACP DISCUSS/DSCN MKR DOCD: CPT | Performed by: INTERNAL MEDICINE

## 2023-10-18 PROCEDURE — 99214 OFFICE O/P EST MOD 30 MIN: CPT | Performed by: INTERNAL MEDICINE

## 2023-10-18 RX ORDER — METOPROLOL SUCCINATE 100 MG/1
100 TABLET, EXTENDED RELEASE ORAL EVERY EVENING
Qty: 30 TABLET | Refills: 11 | Status: SHIPPED | OUTPATIENT
Start: 2023-10-18

## 2023-10-18 RX ORDER — HYDROCODONE BITARTRATE AND ACETAMINOPHEN 7.5; 325 MG/1; MG/1
1 TABLET ORAL EVERY 6 HOURS PRN
COMMUNITY

## 2023-10-18 RX ORDER — APIXABAN 5 MG/1
5 TABLET, FILM COATED ORAL 2 TIMES DAILY
Qty: 60 TABLET | Refills: 11 | Status: SHIPPED | OUTPATIENT
Start: 2023-10-18

## 2023-10-18 RX ORDER — FERROUS SULFATE 325(65) MG
325 TABLET ORAL PRN
COMMUNITY

## 2023-10-18 RX ORDER — PROPAFENONE HYDROCHLORIDE 150 MG/1
150 TABLET, COATED ORAL EVERY 8 HOURS
Qty: 90 TABLET | Refills: 11 | Status: CANCELLED | OUTPATIENT
Start: 2023-10-18

## 2023-10-18 NOTE — PROGRESS NOTES
Hyperlipidemia     Hypertension     Total hip replacement 8/10/2011     Past Surgical History:   Procedure Laterality Date    CARDIOVERSION      CARDIOVERSION  07/05/2023    CARDIOVERSION N/A 7/5/2023    CARDIOVERSION W/ANES. performed by Kya Mckenzie MD at [facility]      COLONOSCOPY  2007    normal per pt    COLONOSCOPY  08/16/2017    colon polyps    DENTAL SURGERY      full mouth extractions    JOINT REPLACEMENT  08/10/2011    Right total hip replacement, anterior approach     NASAL SEPTUM SURGERY      SINUS ENDOSCOPY      TONSILLECTOMY      TUBAL LIGATION         Objective:   LMP  (LMP Unknown)     Wt Readings from Last 3 Encounters:   10/11/23 58.5 kg (129 lb)   09/27/23 58.5 kg (129 lb)   08/17/23 58.1 kg (128 lb)       Physical Exam:  General: No Respiratory distress, appears well developed and well nourished. Eyes:  Sclera nonicteric  Nose/Sinuses:  negative findings: nose shows no deformity, asymmetry, or inflammation, nasal mucosa normal, septum midline with no perforation or bleeding  Back:  no pain to palpation  Joint:  no active joint inflammation  Musculoskeletal:  negative  Skin:  Warm and dry  Neck:  Negative for JVD and Carotid Bruits. Chest:  Clear to auscultation, respiration easy  Cardiovascular:  RRR, S1S2 normal, no murmur, no rub or thrill. Abdomen:  Soft normal liver and spleen  Extremities:   No edema, clubbing, cyanosis,  Pulses: Femoral and pedal pulses are normal.  Neuro: intact    Medications:   Outpatient Encounter Medications as of 10/18/2023   Medication Sig Dispense Refill    traMADol (ULTRAM) 50 MG tablet Take 1 tablet by mouth in the morning and at bedtime.  Max Daily Amount: 100 mg      FLUoxetine (PROZAC) 40 MG capsule Take 1 capsule by mouth daily 90 capsule 3    Cholecalciferol (VITAMIN D) 50 MCG (2000 UT) CAPS capsule Take 1 capsule by mouth daily (before dinner) For vitamin D 30 capsule 5    ferrous sulfate (IRON 325)
from hospital, atrial fibrillation, hypertension, hyperlipidemia and chest pain. Diagnoses and all orders for this visit:    Paroxysmal atrial fibrillation ([address])    Primary hypertension    Stage 3a chronic kidney disease ([address])    Hyperlipidemia, unspecified hyperlipidemia type          Parox afib -with multiple cardioversions and recurrences asymptomatic           Stop propafenone as it is not effective  continue  metoprolol,  and apixaban   HBP BP controlled 116/62 continue metoprolol  HLD -last lipid 5/17/22 LDL 97 on Atorvastatin 40 mg daily will repeat blood work today   Check your heart rate if you are feeling bad and if you are above 100 you maybe in atrial fibrillation with rapid response call us we can adjust your medications. Plan:  Labs reviewed in epic and discussed with patient. Current medications reviewed. Metoprolol  Eliquis and atorvastatin Refills given as warranted. EKG today afib rate controlled   I will discontinue your propafenone since it is not working to control your rhythm. Follow up with me in 6 months    This note is scribed in the presence of Dr. Katelyn Fish MD by Pramod Lange RN.  I, Dr. Margarita Lynn, personally performed the services described in this documentation, as scribed by the above signed scribe in my presence. It is both accurate and complete to my knowledge. I agree with the details independently gathered by the clinical support staff, while the remaining scribed note accurately describes my personal service to the patient.       Katelyn Fish MD  [facility], [address]  [address] [facility] office  [phone] [facility] [place]

## 2023-10-18 NOTE — PATIENT INSTRUCTIONS
Plan:  Labs reviewed in epic and discussed with patient. Current medications reviewed. Refills given as warranted. EKG today  If you are back in atrial fibrillation then I will discontinue your propafenone since it is not working to control your rhythm.       Follow up with me in 6 months

## 2023-10-19 ENCOUNTER — TELEPHONE (OUTPATIENT)
Dept: CARDIOLOGY CLINIC | Age: 78
End: 2023-10-19

## 2023-10-19 LAB
CHOLEST SERPL-MCNC: 161 MG/DL (ref 0–199)
HDLC SERPL-MCNC: 46 MG/DL (ref 40–60)
LDLC SERPL CALC-MCNC: 81 MG/DL
TRIGL SERPL-MCNC: 170 MG/DL (ref 0–150)
VLDLC SERPL CALC-MCNC: 34 MG/DL

## 2023-10-19 NOTE — TELEPHONE ENCOUNTER
----- Message from Keke Wise MD sent at 10/19/2023 10:15 AM EDT -----  Blood cholesterol levels are ok  ----- Message -----  From: Kathryn Clifton Incoming Lab Results From Soft (Jackie Dolan)  Sent: 10/19/2023   1:06 AM EDT  To: Keke Wise MD

## 2023-10-26 ENCOUNTER — CARE COORDINATION (OUTPATIENT)
Dept: CARE COORDINATION | Age: 78
End: 2023-10-26

## 2023-10-26 NOTE — CARE COORDINATION
Multiple attempts to contact patient unsuccessful and patient has not returned calls  No further outreach scheduled with this ACM; episode of care resolved

## 2023-11-17 ENCOUNTER — OFFICE VISIT (OUTPATIENT)
Dept: FAMILY MEDICINE CLINIC | Age: 78
End: 2023-11-17
Payer: MEDICARE

## 2023-11-17 VITALS — BODY MASS INDEX: 18.8 KG/M2 | WEIGHT: 131 LBS | DIASTOLIC BLOOD PRESSURE: 70 MMHG | SYSTOLIC BLOOD PRESSURE: 122 MMHG

## 2023-11-17 DIAGNOSIS — K21.9 GASTROESOPHAGEAL REFLUX DISEASE WITHOUT ESOPHAGITIS: ICD-10-CM

## 2023-11-17 DIAGNOSIS — N18.31 TYPE 2 DIABETES MELLITUS WITH STAGE 3A CHRONIC KIDNEY DISEASE, WITHOUT LONG-TERM CURRENT USE OF INSULIN (HCC): ICD-10-CM

## 2023-11-17 DIAGNOSIS — E11.22 TYPE 2 DIABETES MELLITUS WITH STAGE 3A CHRONIC KIDNEY DISEASE, WITHOUT LONG-TERM CURRENT USE OF INSULIN (HCC): ICD-10-CM

## 2023-11-17 DIAGNOSIS — D50.8 IRON DEFICIENCY ANEMIA SECONDARY TO INADEQUATE DIETARY IRON INTAKE: Primary | ICD-10-CM

## 2023-11-17 DIAGNOSIS — F41.9 ANXIETY: ICD-10-CM

## 2023-11-17 DIAGNOSIS — N18.31 STAGE 3A CHRONIC KIDNEY DISEASE (HCC): ICD-10-CM

## 2023-11-17 DIAGNOSIS — E55.9 VITAMIN D DEFICIENCY: ICD-10-CM

## 2023-11-17 DIAGNOSIS — R35.0 URINARY FREQUENCY: ICD-10-CM

## 2023-11-17 LAB
BACTERIA URNS QL MICRO: ABNORMAL /HPF
BILIRUB UR QL STRIP.AUTO: NEGATIVE
CLARITY UR: CLEAR
COLOR UR: YELLOW
EPI CELLS #/AREA URNS AUTO: 1 /HPF (ref 0–5)
GLUCOSE UR STRIP.AUTO-MCNC: NEGATIVE MG/DL
HGB UR QL STRIP.AUTO: ABNORMAL
HYALINE CASTS #/AREA URNS AUTO: 0 /LPF (ref 0–8)
KETONES UR STRIP.AUTO-MCNC: NEGATIVE MG/DL
LEUKOCYTE ESTERASE UR QL STRIP.AUTO: ABNORMAL
NITRITE UR QL STRIP.AUTO: POSITIVE
PH UR STRIP.AUTO: 5.5 [PH] (ref 5–8)
PROT UR STRIP.AUTO-MCNC: NEGATIVE MG/DL
RBC CLUMPS #/AREA URNS AUTO: 1 /HPF (ref 0–4)
SP GR UR STRIP.AUTO: 1.01 (ref 1–1.03)
UA DIPSTICK W REFLEX MICRO PNL UR: YES
URN SPEC COLLECT METH UR: ABNORMAL
UROBILINOGEN UR STRIP-ACNC: 0.2 E.U./DL
WBC #/AREA URNS AUTO: 17 /HPF (ref 0–5)

## 2023-11-17 PROCEDURE — 3074F SYST BP LT 130 MM HG: CPT | Performed by: NURSE PRACTITIONER

## 2023-11-17 PROCEDURE — 36415 COLL VENOUS BLD VENIPUNCTURE: CPT | Performed by: NURSE PRACTITIONER

## 2023-11-17 PROCEDURE — 1123F ACP DISCUSS/DSCN MKR DOCD: CPT | Performed by: NURSE PRACTITIONER

## 2023-11-17 PROCEDURE — 99214 OFFICE O/P EST MOD 30 MIN: CPT | Performed by: NURSE PRACTITIONER

## 2023-11-17 PROCEDURE — 3044F HG A1C LEVEL LT 7.0%: CPT | Performed by: NURSE PRACTITIONER

## 2023-11-17 PROCEDURE — 3078F DIAST BP <80 MM HG: CPT | Performed by: NURSE PRACTITIONER

## 2023-11-17 RX ORDER — HYDROCODONE BITARTRATE AND ACETAMINOPHEN 5; 325 MG/1; MG/1
1 TABLET ORAL EVERY 6 HOURS PRN
COMMUNITY

## 2023-11-17 RX ORDER — OXYBUTYNIN CHLORIDE 10 MG/1
10 TABLET, EXTENDED RELEASE ORAL DAILY
Qty: 90 TABLET | Refills: 0 | Status: SHIPPED | OUTPATIENT
Start: 2023-11-17 | End: 2024-02-15

## 2023-11-17 ASSESSMENT — ENCOUNTER SYMPTOMS
SHORTNESS OF BREATH: 0
DIARRHEA: 0
CONSTIPATION: 0
CHEST TIGHTNESS: 0
COUGH: 0
BLOOD IN STOOL: 0

## 2023-11-17 NOTE — PROGRESS NOTES
11/17/2023    Chief Complaint   Patient presents with    Diabetes    Hypertension    Atrial Fibrillation    Gastroesophageal Reflux    Anxiety       Lauren Garcia is a 70 y.o. female, presents today for:      ASSESSMENT/PLAN:  1. Iron deficiency anemia secondary to inadequate dietary iron intake  Likely improving due to less fatigue  Updated labs ordered today. Continue oral iron for now. S/p Venofer x 2 7/2023. Reviewed nail ridges possibly due to recent anemia- monitor for now. - Iron and TIBC  - Ferritin  - PTH, Intact    2. Type 2 diabetes mellitus with stage 3a chronic kidney disease, without long-term current use of insulin ([address])  Diet controlled  Updated labs today  No ACE/ARB- defer to Cardio  Continue Atorvastatin 40  - Hemoglobin A1C    3. Gastroesophageal reflux disease without esophagitis  Controlled today  Continue Omeprazole 40  Encouraged to avoid heartburn inducing foods    4. Stage 3a chronic kidney disease ([address])  Screenings labs today  Discussed physiology, role of CKD with afib and importance of screening labs. Kidney function has overall been stable for the past several months so will not refer to Nephrology for now  Cr average 0.8-1.0; GFR average 58->60  - Hemoglobin A1C  - Comprehensive Metabolic Panel  - CBC with Auto Differential  - Urinalysis with Microscopic  - Iron and TIBC  - Ferritin  - Vitamin D 25 Hydroxy  - PTH, Intact    5. Anxiety  Overall controlled today  No need for medication changes    6. Urinary frequency  Trial Ditropan  UA ordered today  Update 11/21- Ditropan helping symptoms- wishing to take ER if possible. Urinary symptoms now worsening, requesting medications to help.   - oxybutynin (DITROPAN XL) 10 MG extended release tablet; Take 1 tablet by mouth daily  Dispense: 90 tablet; Refill: 0  - nitrofurantoin, macrocrystal-monohydrate, (MACROBID) 100 MG capsule; Take 1 capsule by mouth 2 times daily for 5 days  Dispense: 10 capsule;  Refill: 0  -

## 2023-11-18 LAB
25(OH)D3 SERPL-MCNC: 45.8 NG/ML
ALBUMIN SERPL-MCNC: 4.3 G/DL (ref 3.4–5)
ALBUMIN/GLOB SERPL: 1.9 {RATIO} (ref 1.1–2.2)
ALP SERPL-CCNC: 124 U/L (ref 40–129)
ALT SERPL-CCNC: 25 U/L (ref 10–40)
ANION GAP SERPL CALCULATED.3IONS-SCNC: 11 MMOL/L (ref 3–16)
AST SERPL-CCNC: 23 U/L (ref 15–37)
BASOPHILS # BLD: 0.1 K/UL (ref 0–0.2)
BASOPHILS NFR BLD: 0.7 %
BILIRUB SERPL-MCNC: 1.2 MG/DL (ref 0–1)
BUN SERPL-MCNC: 21 MG/DL (ref 7–20)
CALCIUM SERPL-MCNC: 9.7 MG/DL (ref 8.3–10.6)
CHLORIDE SERPL-SCNC: 102 MMOL/L (ref 99–110)
CO2 SERPL-SCNC: 26 MMOL/L (ref 21–32)
CREAT SERPL-MCNC: 1 MG/DL (ref 0.6–1.2)
DEPRECATED RDW RBC AUTO: 16.2 % (ref 12.4–15.4)
EOSINOPHIL # BLD: 0.1 K/UL (ref 0–0.6)
EOSINOPHIL NFR BLD: 0.8 %
EST. AVERAGE GLUCOSE BLD GHB EST-MCNC: 137 MG/DL
FERRITIN SERPL IA-MCNC: 18.6 NG/ML (ref 15–150)
GFR SERPLBLD CREATININE-BSD FMLA CKD-EPI: 58 ML/MIN/{1.73_M2}
GLUCOSE SERPL-MCNC: 112 MG/DL (ref 70–99)
HBA1C MFR BLD: 6.4 %
HCT VFR BLD AUTO: 35.1 % (ref 36–48)
HGB BLD-MCNC: 11.4 G/DL (ref 12–16)
IRON SATN MFR SERPL: 16 % (ref 15–50)
IRON SERPL-MCNC: 70 UG/DL (ref 37–145)
LYMPHOCYTES # BLD: 2.2 K/UL (ref 1–5.1)
LYMPHOCYTES NFR BLD: 28.4 %
MCH RBC QN AUTO: 26.8 PG (ref 26–34)
MCHC RBC AUTO-ENTMCNC: 32.6 G/DL (ref 31–36)
MCV RBC AUTO: 82.1 FL (ref 80–100)
MONOCYTES # BLD: 0.6 K/UL (ref 0–1.3)
MONOCYTES NFR BLD: 7.8 %
NEUTROPHILS # BLD: 4.9 K/UL (ref 1.7–7.7)
NEUTROPHILS NFR BLD: 62.3 %
PLATELET # BLD AUTO: 301 K/UL (ref 135–450)
PMV BLD AUTO: 8.7 FL (ref 5–10.5)
POTASSIUM SERPL-SCNC: 4 MMOL/L (ref 3.5–5.1)
PROT SERPL-MCNC: 6.6 G/DL (ref 6.4–8.2)
PTH-INTACT SERPL-MCNC: 52.7 PG/ML (ref 14–72)
RBC # BLD AUTO: 4.27 M/UL (ref 4–5.2)
SODIUM SERPL-SCNC: 139 MMOL/L (ref 136–145)
TIBC SERPL-MCNC: 446 UG/DL (ref 260–445)
WBC # BLD AUTO: 7.8 K/UL (ref 4–11)

## 2023-11-21 NOTE — RESULT ENCOUNTER NOTE
Anemia is better. Iron level is better. Kidney function continues to be slightly low- try to continue to drink a lot of water. Looks like you have bacteria in your urine- how are you feeling.       Vitamin D level is normal.      No diabetes

## 2023-11-22 RX ORDER — FLUCONAZOLE 150 MG/1
150 TABLET ORAL
Qty: 2 TABLET | Refills: 0 | Status: SHIPPED | OUTPATIENT
Start: 2023-11-22 | End: 2023-11-28

## 2023-11-22 RX ORDER — NITROFURANTOIN 25; 75 MG/1; MG/1
100 CAPSULE ORAL 2 TIMES DAILY
Qty: 10 CAPSULE | Refills: 0 | Status: SHIPPED | OUTPATIENT
Start: 2023-11-22 | End: 2023-11-27

## 2023-11-22 NOTE — RESULT ENCOUNTER NOTE
I sent Aida Gonzales for her worsening urinary symptoms. Regarding the ridges- I still feel it is iron related. It is going to take at least 6 months for the nails to grow out and she has been anemic for the past several months. Lets look at them again in April and see what they look like. Or she can send me a picture of them at the beginning of the year and we can see where they stand.

## 2023-12-20 DIAGNOSIS — E55.9 VITAMIN D DEFICIENCY: ICD-10-CM

## 2023-12-20 RX ORDER — MULTIVIT-MIN/IRON/FOLIC ACID/K 18-600-40
1 CAPSULE ORAL
Qty: 30 CAPSULE | Refills: 5 | OUTPATIENT
Start: 2023-12-20

## 2024-02-08 ENCOUNTER — OFFICE VISIT (OUTPATIENT)
Dept: FAMILY MEDICINE CLINIC | Age: 79
End: 2024-02-08
Payer: MEDICARE

## 2024-02-08 VITALS
BODY MASS INDEX: 18.94 KG/M2 | SYSTOLIC BLOOD PRESSURE: 122 MMHG | WEIGHT: 132 LBS | HEART RATE: 85 BPM | DIASTOLIC BLOOD PRESSURE: 82 MMHG | OXYGEN SATURATION: 99 % | TEMPERATURE: 98.6 F

## 2024-02-08 DIAGNOSIS — H61.21 RIGHT EAR IMPACTED CERUMEN: Primary | ICD-10-CM

## 2024-02-08 DIAGNOSIS — H69.93 EUSTACHIAN TUBE DYSFUNCTION, BILATERAL: ICD-10-CM

## 2024-02-08 PROCEDURE — 3074F SYST BP LT 130 MM HG: CPT | Performed by: NURSE PRACTITIONER

## 2024-02-08 PROCEDURE — 99213 OFFICE O/P EST LOW 20 MIN: CPT | Performed by: NURSE PRACTITIONER

## 2024-02-08 PROCEDURE — 3079F DIAST BP 80-89 MM HG: CPT | Performed by: NURSE PRACTITIONER

## 2024-02-08 PROCEDURE — 69209 REMOVE IMPACTED EAR WAX UNI: CPT | Performed by: NURSE PRACTITIONER

## 2024-02-08 PROCEDURE — 1123F ACP DISCUSS/DSCN MKR DOCD: CPT | Performed by: NURSE PRACTITIONER

## 2024-02-08 RX ORDER — METHYLPREDNISOLONE 4 MG/1
TABLET ORAL
Qty: 1 KIT | Refills: 0 | Status: SHIPPED | OUTPATIENT
Start: 2024-02-08 | End: 2024-02-14

## 2024-02-08 NOTE — PROGRESS NOTES
vomiting.   Musculoskeletal:  Negative for neck pain.   Skin:  Negative for rash.   Neurological: Negative.  Negative for headaches.   Psychiatric/Behavioral: Negative.         Current Outpatient Medications on File Prior to Visit   Medication Sig Dispense Refill    Cholecalciferol (VITAMIN D3) 50 MCG (2000 UT) CAPS TAKE ONE (1) CAPSULE BY MOUTH DAILY (BEFORE DINNER) FOR VITAMIN D 30 capsule 5    ferrous sulfate (IRON 325) 325 (65 Fe) MG tablet Take 1 tablet by mouth as needed (take as needed)      metoprolol succinate (TOPROL XL) 100 MG extended release tablet Take 1 tablet by mouth every evening 30 tablet 11    ELIQUIS 5 MG TABS tablet Take 1 tablet by mouth 2 times daily 60 tablet 11    FLUoxetine (PROZAC) 40 MG capsule Take 1 capsule by mouth daily 90 capsule 3    atorvastatin (LIPITOR) 40 MG tablet Take 1 tablet by mouth daily 90 tablet 3    omeprazole (PRILOSEC) 40 MG delayed release capsule Take 1 capsule by mouth daily 90 capsule 3    cetirizine (ZYRTEC) 10 MG tablet Take 1 tablet by mouth daily       No current facility-administered medications on file prior to visit.     Allergies   Allergen Reactions    Naproxen Other (See Comments)     Oral ulcers      Lisinopril Other (See Comments)     Cough       Past Medical History:   Diagnosis Date    Anemia     Arthritis     Atrial fibrillation (HCC)     Back pain     Diabetes mellitus (HCC)     diet controlled, does not take medication    Hyperlipidemia     Hypertension     Total hip replacement 8/10/2011     Past Surgical History:   Procedure Laterality Date    CARDIOVERSION      CARDIOVERSION  07/05/2023    CARDIOVERSION N/A 7/5/2023    CARDIOVERSION W/ANES. performed by Zack Cohen MD at [facility]    CATARACT REMOVAL      CHOLECYSTECTOMY      COLONOSCOPY  2007    normal per pt    COLONOSCOPY  08/16/2017    colon polyps    DENTAL SURGERY      full mouth extractions    JOINT REPLACEMENT  08/10/2011    Right total hip replacement, anterior approach

## 2024-02-08 NOTE — PATIENT INSTRUCTIONS
For ear wax can try over the Counter Debrox.      For nasal sprays can do:    --Flonase  --Nasocort  --Rhinocort    You would do all of these twice a day.

## 2024-02-14 DIAGNOSIS — R35.0 URINARY FREQUENCY: ICD-10-CM

## 2024-02-14 RX ORDER — OXYBUTYNIN CHLORIDE 10 MG/1
10 TABLET, EXTENDED RELEASE ORAL DAILY
Qty: 90 TABLET | Refills: 0 | Status: SHIPPED | OUTPATIENT
Start: 2024-02-14 | End: 2024-05-14

## 2024-03-25 DIAGNOSIS — K21.9 GASTROESOPHAGEAL REFLUX DISEASE WITHOUT ESOPHAGITIS: ICD-10-CM

## 2024-03-26 RX ORDER — OMEPRAZOLE 40 MG/1
40 CAPSULE, DELAYED RELEASE ORAL DAILY
Qty: 90 CAPSULE | Refills: 3 | Status: SHIPPED | OUTPATIENT
Start: 2024-03-26

## 2024-04-08 NOTE — PROGRESS NOTES
[facility] Office follow up Note  4/10/2024     Subjective:  Ms. Maravilla is here for hospital follow up for Persistent afib .  C/o shortness of breath, chest pain  [place]:  She has a PMH of PAF s/p DCCV 2/2020 and on 7/5/2023, HTN and HLD. She has been on metoprolol and propafenone. She is anticoagulated. She was last seen by Zoila Siu NP with [facility] on 8/10/2023   Today, 4/10/24 she presents with a cane.  She reports she started getting short of breath for about a month.  She has been outside more since the weather has gotten warmer.  She gets short of breath with any hurried activity. She has had shortness of breath in the past but it got better and now it is back again.  She began getting chest pain about a week ago.  The pain is on the left side of her chest, comes and goes and occurs randomly.  The pain does not wake her up. She reports she has anxiety.  Her dog is sick right now.  Her home heart rate has been running above 100 bpm at home. She has  a wrist watch that tells heart rate.  Denies any blood in her stool. Patient denies current edema, palpitations, dizziness or syncope.  Patient is taking all cardiac medications as prescribed and tolerates them well.      Patient is vaccinated against Covid. [facility] on 7/3/2023 for afib consulted followed by successful DCCV.      Review of Systems:  12 point ROS negative in all areas as listed below except as in [place]  Constitutional, EENT, pulmonary, GI, , Musculoskeletal, skin, neurological, hematological, endocrine, Psychiatric      Reviewed past medical history, social, and family history.   Smoking none   Alcohol none   Past Medical History:   Diagnosis Date    Anemia     Arthritis     Atrial fibrillation (HCC)     Back pain     Diabetes mellitus (HCC)     diet controlled, does not take medication    Hyperlipidemia     Hypertension     Total hip replacement 8/10/2011     Past Surgical History:   Procedure Laterality Date

## 2024-04-10 ENCOUNTER — OFFICE VISIT (OUTPATIENT)
Dept: CARDIOLOGY CLINIC | Age: 79
End: 2024-04-10
Payer: MEDICARE

## 2024-04-10 VITALS
HEIGHT: 70 IN | SYSTOLIC BLOOD PRESSURE: 108 MMHG | BODY MASS INDEX: 18.84 KG/M2 | OXYGEN SATURATION: 97 % | DIASTOLIC BLOOD PRESSURE: 58 MMHG | HEART RATE: 93 BPM | WEIGHT: 131.6 LBS

## 2024-04-10 DIAGNOSIS — I10 PRIMARY HYPERTENSION: ICD-10-CM

## 2024-04-10 DIAGNOSIS — I48.19 PERSISTENT ATRIAL FIBRILLATION (HCC): ICD-10-CM

## 2024-04-10 DIAGNOSIS — R06.02 SHORTNESS OF BREATH: ICD-10-CM

## 2024-04-10 DIAGNOSIS — I20.9 ANGINA PECTORIS SYNDROME (HCC): Primary | ICD-10-CM

## 2024-04-10 DIAGNOSIS — E78.00 PURE HYPERCHOLESTEROLEMIA: ICD-10-CM

## 2024-04-10 PROCEDURE — 99214 OFFICE O/P EST MOD 30 MIN: CPT | Performed by: INTERNAL MEDICINE

## 2024-04-10 PROCEDURE — 93000 ELECTROCARDIOGRAM COMPLETE: CPT | Performed by: INTERNAL MEDICINE

## 2024-04-10 PROCEDURE — 3074F SYST BP LT 130 MM HG: CPT | Performed by: INTERNAL MEDICINE

## 2024-04-10 PROCEDURE — 3078F DIAST BP <80 MM HG: CPT | Performed by: INTERNAL MEDICINE

## 2024-04-10 PROCEDURE — 1123F ACP DISCUSS/DSCN MKR DOCD: CPT | Performed by: INTERNAL MEDICINE

## 2024-04-10 RX ORDER — DIGOXIN 125 MCG
125 TABLET ORAL DAILY
Qty: 30 TABLET | Refills: 6 | Status: SHIPPED | OUTPATIENT
Start: 2024-04-10

## 2024-04-10 RX ORDER — METHOCARBAMOL 500 MG/1
TABLET, FILM COATED ORAL
COMMUNITY

## 2024-04-10 NOTE — PATIENT INSTRUCTIONS
Plan:  Labs reviewed in epic and discussed with patient.  Current medications reviewed. Metoprolol  Eliquis and atorvastatin Refills given as warranted.  EKG today  I will personally review your tests and then I will have my staff call you with the results.   Call [phone] to schedule a Stress Test   - test will look for blockages and the blood flow through the heart  - A small IV will be placed into your arm to administer a radioisotope (myoview) to visualize your heart..   - You will then have a waiting period to allow the tracer to be absorbed by the heart muscle. After the waiting period, we will take pictures of the blood flow to your heart muscle with the nuclear camera.   - Following your pictures, we will perform your stress test. We can do your stress test by giving you a medication (Lexiscan) which makes your body think it is exercising.   - We will monitor you before, during, and after your stress test to make sure you are safe and comfortable.  Stress Test instructions  - Allow 3-4 hours for the entire test to be complete.   - Nothing to eat or drink except water after midnight. If you are scheduled in the afternoon, you are permitted to eat a light breakfast such as a bowl of cereal or toast.   - Do not drink or eat anything containing caffeine 24 hours prior to test. This includes coffee, tea, soda, and chocolate- not even decaffeinated or caffeine free products.   - take your regular medications as prescribed the morning of procedures (except betablockers).   - If you are diabetic, bring your glucometer, insulin, and oral medication with you and you may take it after your first injection. Please call with your questions or concerns.   Start taking lanoxin 125 mcg daily.  Take 2 pills today and 2 pills tomorrow.  Then you will only take one pill a day after that. Friday you will begin taking only one pill a day.      Follow up with me in 3 months

## 2024-04-23 DIAGNOSIS — E78.00 PURE HYPERCHOLESTEROLEMIA: ICD-10-CM

## 2024-04-23 DIAGNOSIS — K21.9 GASTROESOPHAGEAL REFLUX DISEASE WITHOUT ESOPHAGITIS: ICD-10-CM

## 2024-04-23 RX ORDER — OMEPRAZOLE 40 MG/1
40 CAPSULE, DELAYED RELEASE ORAL DAILY
Qty: 90 CAPSULE | Refills: 3 | Status: SHIPPED | OUTPATIENT
Start: 2024-04-23

## 2024-04-23 RX ORDER — ATORVASTATIN CALCIUM 40 MG/1
40 TABLET, FILM COATED ORAL DAILY
Qty: 90 TABLET | Refills: 3 | OUTPATIENT
Start: 2024-04-23

## 2024-04-25 DIAGNOSIS — E78.00 PURE HYPERCHOLESTEROLEMIA: ICD-10-CM

## 2024-04-25 RX ORDER — ATORVASTATIN CALCIUM 40 MG/1
40 TABLET, FILM COATED ORAL DAILY
Qty: 90 TABLET | Refills: 3 | Status: SHIPPED | OUTPATIENT
Start: 2024-04-25

## 2024-04-25 NOTE — TELEPHONE ENCOUNTER
Refill Request     CONFIRM preferrred pharmacy with the patient.    If Mail Order Rx - Pend for 90 day refill.      Last Seen: Last Seen Department: 2/8/2024  Last Seen by PCP: 2/8/2024    Last Written: 4-6-2023    If no future appointment scheduled, route STAFF MESSAGE with patient name to the  Pool for scheduling.      Next Appointment:   Future Appointments   Date Time Provider Department Center   4/29/2024 10:00 AM [facility] [place]ARDI[facility] [place] [facility] [place] [facility]   4/29/2024 10:30 AM [facility] HOD   7/8/2024 11:00 AM Zack Cohen MD [facility]       Message sent to  to schedule appt with patient?  NO      Requested Prescriptions     Pending Prescriptions Disp Refills    atorvastatin (LIPITOR) 40 MG tablet [Pharmacy Med Name: ATORVASTATIN CALCIUM 40MG TABLET] 90 tablet 3     Sig: TAKE ONE (1) TABLET BY MOUTH DAILY

## 2024-04-29 ENCOUNTER — HOSPITAL ENCOUNTER (OUTPATIENT)
Dept: NON INVASIVE DIAGNOSTICS | Age: 79
Discharge: HOME | End: 2024-04-29

## 2024-05-07 DIAGNOSIS — R35.0 URINARY FREQUENCY: ICD-10-CM

## 2024-05-07 NOTE — TELEPHONE ENCOUNTER
Last seen 11/17/23, for routine told to fu in 6 month no appt on file need to call and set appt up

## 2024-05-09 RX ORDER — OXYBUTYNIN CHLORIDE 10 MG/1
10 TABLET, EXTENDED RELEASE ORAL DAILY
Qty: 90 TABLET | Refills: 0 | Status: SHIPPED | OUTPATIENT
Start: 2024-05-09

## 2024-07-22 DIAGNOSIS — E55.9 VITAMIN D DEFICIENCY: ICD-10-CM

## 2024-07-22 DIAGNOSIS — R35.0 URINARY FREQUENCY: ICD-10-CM

## 2024-07-22 DIAGNOSIS — F41.9 ANXIETY: ICD-10-CM

## 2024-07-22 RX ORDER — ACETAMINOPHEN 160 MG
TABLET,DISINTEGRATING ORAL
Qty: 30 CAPSULE | Refills: 5 | Status: SHIPPED | OUTPATIENT
Start: 2024-07-22

## 2024-07-23 RX ORDER — ACETAMINOPHEN 160 MG
TABLET,DISINTEGRATING ORAL
Qty: 30 CAPSULE | Refills: 5 | OUTPATIENT
Start: 2024-07-23

## 2024-07-23 RX ORDER — FLUOXETINE 40 MG/1
40 CAPSULE ORAL DAILY
Qty: 90 CAPSULE | Refills: 3 | OUTPATIENT
Start: 2024-07-23

## 2024-07-23 RX ORDER — FLUOXETINE HYDROCHLORIDE 40 MG/1
CAPSULE ORAL DAILY
Qty: 90 CAPSULE | Refills: 3 | Status: SHIPPED | OUTPATIENT
Start: 2024-07-23

## 2024-07-23 RX ORDER — OXYBUTYNIN CHLORIDE 10 MG/1
10 TABLET, EXTENDED RELEASE ORAL DAILY
Qty: 90 TABLET | Refills: 0 | Status: SHIPPED | OUTPATIENT
Start: 2024-07-23

## 2024-08-05 DIAGNOSIS — I48.0 PAROXYSMAL ATRIAL FIBRILLATION (HCC): ICD-10-CM

## 2024-08-05 RX ORDER — APIXABAN 5 MG/1
5 TABLET, FILM COATED ORAL 2 TIMES DAILY
Qty: 60 TABLET | Refills: 11 | OUTPATIENT
Start: 2024-08-05

## 2024-08-05 RX ORDER — APIXABAN 5 MG/1
5 TABLET, FILM COATED ORAL 2 TIMES DAILY
Qty: 60 TABLET | Refills: 11 | Status: SHIPPED | OUTPATIENT
Start: 2024-08-05

## 2024-08-05 NOTE — TELEPHONE ENCOUNTER
Last ov: 4/10/24 RKG  Upcoming ov: 10/23/24 RKG        Follow up with me in 3 months or earlier as needed.     Pt states she has no car or transportation to get to any appt. I recommended [facility] transportation \    RKG OOT [facility] please advise

## 2024-08-20 DIAGNOSIS — R35.0 URINARY FREQUENCY: ICD-10-CM

## 2024-08-20 RX ORDER — OXYBUTYNIN CHLORIDE 10 MG/1
10 TABLET, EXTENDED RELEASE ORAL DAILY
Qty: 90 TABLET | Refills: 0 | Status: SHIPPED | OUTPATIENT
Start: 2024-08-20

## 2024-08-20 NOTE — TELEPHONE ENCOUNTER
Last routine visit was 11/17/23, told to fu iin 6 mo no appt on file   Left msg for patient to return call and make appt

## 2024-09-05 ENCOUNTER — OFFICE VISIT (OUTPATIENT)
Dept: FAMILY MEDICINE CLINIC | Age: 79
End: 2024-09-05
Payer: MEDICARE

## 2024-09-05 VITALS
SYSTOLIC BLOOD PRESSURE: 132 MMHG | TEMPERATURE: 97 F | BODY MASS INDEX: 17.36 KG/M2 | DIASTOLIC BLOOD PRESSURE: 62 MMHG | WEIGHT: 121 LBS | OXYGEN SATURATION: 98 % | HEART RATE: 84 BPM

## 2024-09-05 DIAGNOSIS — F41.9 ANXIETY: ICD-10-CM

## 2024-09-05 DIAGNOSIS — R35.0 URINARY FREQUENCY: ICD-10-CM

## 2024-09-05 DIAGNOSIS — E11.22 TYPE 2 DIABETES MELLITUS WITH STAGE 3A CHRONIC KIDNEY DISEASE, WITHOUT LONG-TERM CURRENT USE OF INSULIN (HCC): ICD-10-CM

## 2024-09-05 DIAGNOSIS — N18.31 TYPE 2 DIABETES MELLITUS WITH STAGE 3A CHRONIC KIDNEY DISEASE, WITHOUT LONG-TERM CURRENT USE OF INSULIN (HCC): ICD-10-CM

## 2024-09-05 DIAGNOSIS — Z00.00 MEDICARE ANNUAL WELLNESS VISIT, SUBSEQUENT: Primary | ICD-10-CM

## 2024-09-05 DIAGNOSIS — D50.8 IRON DEFICIENCY ANEMIA SECONDARY TO INADEQUATE DIETARY IRON INTAKE: ICD-10-CM

## 2024-09-05 PROCEDURE — G0439 PPPS, SUBSEQ VISIT: HCPCS | Performed by: NURSE PRACTITIONER

## 2024-09-05 PROCEDURE — 1123F ACP DISCUSS/DSCN MKR DOCD: CPT | Performed by: NURSE PRACTITIONER

## 2024-09-05 PROCEDURE — 3075F SYST BP GE 130 - 139MM HG: CPT | Performed by: NURSE PRACTITIONER

## 2024-09-05 PROCEDURE — 3078F DIAST BP <80 MM HG: CPT | Performed by: NURSE PRACTITIONER

## 2024-09-05 PROCEDURE — 36415 COLL VENOUS BLD VENIPUNCTURE: CPT | Performed by: NURSE PRACTITIONER

## 2024-09-05 PROCEDURE — 3044F HG A1C LEVEL LT 7.0%: CPT | Performed by: NURSE PRACTITIONER

## 2024-09-05 RX ORDER — SOLIFENACIN SUCCINATE 5 MG/1
5 TABLET, FILM COATED ORAL DAILY
Qty: 30 TABLET | Refills: 0 | Status: SHIPPED | OUTPATIENT
Start: 2024-09-05 | End: 2025-09-05

## 2024-09-05 SDOH — ECONOMIC STABILITY: FOOD INSECURITY: WITHIN THE PAST 12 MONTHS, THE FOOD YOU BOUGHT JUST DIDN'T LAST AND YOU DIDN'T HAVE MONEY TO GET MORE.: NEVER TRUE

## 2024-09-05 SDOH — ECONOMIC STABILITY: FOOD INSECURITY: WITHIN THE PAST 12 MONTHS, YOU WORRIED THAT YOUR FOOD WOULD RUN OUT BEFORE YOU GOT MONEY TO BUY MORE.: NEVER TRUE

## 2024-09-05 SDOH — ECONOMIC STABILITY: INCOME INSECURITY: HOW HARD IS IT FOR YOU TO PAY FOR THE VERY BASICS LIKE FOOD, HOUSING, MEDICAL CARE, AND HEATING?: NOT HARD AT ALL

## 2024-09-05 ASSESSMENT — PATIENT HEALTH QUESTIONNAIRE - PHQ9
7. TROUBLE CONCENTRATING ON THINGS, SUCH AS READING THE NEWSPAPER OR WATCHING TELEVISION: NOT AT ALL
1. LITTLE INTEREST OR PLEASURE IN DOING THINGS: MORE THAN HALF THE DAYS
SUM OF ALL RESPONSES TO PHQ QUESTIONS 1-9: 11
SUM OF ALL RESPONSES TO PHQ9 QUESTIONS 1 & 2: 5
3. TROUBLE FALLING OR STAYING ASLEEP: NEARLY EVERY DAY
4. FEELING TIRED OR HAVING LITTLE ENERGY: SEVERAL DAYS
9. THOUGHTS THAT YOU WOULD BE BETTER OFF DEAD, OR OF HURTING YOURSELF: NOT AT ALL
SUM OF ALL RESPONSES TO PHQ QUESTIONS 1-9: 11
5. POOR APPETITE OR OVEREATING: MORE THAN HALF THE DAYS
2. FEELING DOWN, DEPRESSED OR HOPELESS: NEARLY EVERY DAY
10. IF YOU CHECKED OFF ANY PROBLEMS, HOW DIFFICULT HAVE THESE PROBLEMS MADE IT FOR YOU TO DO YOUR WORK, TAKE CARE OF THINGS AT HOME, OR GET ALONG WITH OTHER PEOPLE: VERY DIFFICULT
SUM OF ALL RESPONSES TO PHQ QUESTIONS 1-9: 11
6. FEELING BAD ABOUT YOURSELF - OR THAT YOU ARE A FAILURE OR HAVE LET YOURSELF OR YOUR FAMILY DOWN: NOT AT ALL
SUM OF ALL RESPONSES TO PHQ QUESTIONS 1-9: 11
8. MOVING OR SPEAKING SO SLOWLY THAT OTHER PEOPLE COULD HAVE NOTICED. OR THE OPPOSITE, BEING SO FIGETY OR RESTLESS THAT YOU HAVE BEEN MOVING AROUND A LOT MORE THAN USUAL: NOT AT ALL

## 2024-09-05 ASSESSMENT — LIFESTYLE VARIABLES
HOW MANY STANDARD DRINKS CONTAINING ALCOHOL DO YOU HAVE ON A TYPICAL DAY: PATIENT DOES NOT DRINK
HOW OFTEN DO YOU HAVE A DRINK CONTAINING ALCOHOL: NEVER
HOW OFTEN DO YOU HAVE A DRINK CONTAINING ALCOHOL: NEVER

## 2024-09-06 ENCOUNTER — PATIENT MESSAGE (OUTPATIENT)
Dept: FAMILY MEDICINE CLINIC | Age: 79
End: 2024-09-06

## 2024-09-06 DIAGNOSIS — D50.8 IRON DEFICIENCY ANEMIA SECONDARY TO INADEQUATE DIETARY IRON INTAKE: Primary | ICD-10-CM

## 2024-09-06 LAB
ALBUMIN SERPL-MCNC: 4.2 G/DL (ref 3.4–5)
ALBUMIN/GLOB SERPL: 1.8 {RATIO} (ref 1.1–2.2)
ALP SERPL-CCNC: 131 U/L (ref 40–129)
ALT SERPL-CCNC: 19 U/L (ref 10–40)
AMORPH SED URNS QL MICRO: ABNORMAL /HPF
ANION GAP SERPL CALCULATED.3IONS-SCNC: 11 MMOL/L (ref 3–16)
AST SERPL-CCNC: 22 U/L (ref 15–37)
BACTERIA URNS QL MICRO: ABNORMAL /HPF
BASOPHILS # BLD: 0.1 K/UL (ref 0–0.2)
BASOPHILS NFR BLD: 0.9 %
BILIRUB SERPL-MCNC: 1 MG/DL (ref 0–1)
BILIRUB UR QL STRIP.AUTO: NEGATIVE
BUN SERPL-MCNC: 26 MG/DL (ref 7–20)
CALCIUM SERPL-MCNC: 9.8 MG/DL (ref 8.3–10.6)
CHARACTER UR: ABNORMAL
CHLORIDE SERPL-SCNC: 98 MMOL/L (ref 99–110)
CLARITY UR: CLEAR
CO2 SERPL-SCNC: 25 MMOL/L (ref 21–32)
COLOR UR: YELLOW
CREAT SERPL-MCNC: 1 MG/DL (ref 0.6–1.2)
DEPRECATED RDW RBC AUTO: 19 % (ref 12.4–15.4)
EOSINOPHIL # BLD: 0 K/UL (ref 0–0.6)
EOSINOPHIL NFR BLD: 0.6 %
EPI CELLS #/AREA URNS HPF: ABNORMAL /HPF (ref 0–5)
EST. AVERAGE GLUCOSE BLD GHB EST-MCNC: 139.9 MG/DL
FERRITIN SERPL IA-MCNC: 7.5 NG/ML (ref 15–150)
FINE GRAN CASTS #/AREA URNS HPF: ABNORMAL /LPF (ref 0–2)
GFR SERPLBLD CREATININE-BSD FMLA CKD-EPI: 58 ML/MIN/{1.73_M2}
GLUCOSE SERPL-MCNC: 185 MG/DL (ref 70–99)
GLUCOSE UR STRIP.AUTO-MCNC: 100 MG/DL
HBA1C MFR BLD: 6.5 %
HCT VFR BLD AUTO: 33 % (ref 36–48)
HGB BLD-MCNC: 10.1 G/DL (ref 12–16)
HGB UR QL STRIP.AUTO: ABNORMAL
HYALINE CASTS #/AREA URNS LPF: ABNORMAL /LPF (ref 0–2)
IRON SATN MFR SERPL: 8 % (ref 15–50)
IRON SERPL-MCNC: 32 UG/DL (ref 37–145)
KETONES UR STRIP.AUTO-MCNC: NEGATIVE MG/DL
LEUKOCYTE ESTERASE UR QL STRIP.AUTO: ABNORMAL
LYMPHOCYTES # BLD: 2.4 K/UL (ref 1–5.1)
LYMPHOCYTES NFR BLD: 29 %
MCH RBC QN AUTO: 22.5 PG (ref 26–34)
MCHC RBC AUTO-ENTMCNC: 30.8 G/DL (ref 31–36)
MCV RBC AUTO: 73.2 FL (ref 80–100)
MONOCYTES # BLD: 0.6 K/UL (ref 0–1.3)
MONOCYTES NFR BLD: 7.2 %
NEUTROPHILS # BLD: 5.2 K/UL (ref 1.7–7.7)
NEUTROPHILS NFR BLD: 62.3 %
NITRITE UR QL STRIP.AUTO: POSITIVE
PH UR STRIP.AUTO: 5.5 [PH] (ref 5–8)
PLATELET # BLD AUTO: 313 K/UL (ref 135–450)
PMV BLD AUTO: 8.3 FL (ref 5–10.5)
POTASSIUM SERPL-SCNC: 4.5 MMOL/L (ref 3.5–5.1)
PROT SERPL-MCNC: 6.6 G/DL (ref 6.4–8.2)
PROT UR STRIP.AUTO-MCNC: ABNORMAL MG/DL
RBC # BLD AUTO: 4.5 M/UL (ref 4–5.2)
RBC #/AREA URNS HPF: ABNORMAL /HPF (ref 0–4)
SODIUM SERPL-SCNC: 134 MMOL/L (ref 136–145)
SP GR UR STRIP.AUTO: 1.02 (ref 1–1.03)
TIBC SERPL-MCNC: 419 UG/DL (ref 260–445)
TSH SERPL DL<=0.005 MIU/L-ACNC: 1.08 UIU/ML (ref 0.27–4.2)
UA DIPSTICK W REFLEX MICRO PNL UR: YES
URN SPEC COLLECT METH UR: ABNORMAL
UROBILINOGEN UR STRIP-ACNC: 0.2 E.U./DL
WBC # BLD AUTO: 8.4 K/UL (ref 4–11)
WBC #/AREA URNS HPF: ABNORMAL /HPF (ref 0–5)

## 2024-09-06 RX ORDER — FERROUS SULFATE 325(65) MG
325 TABLET ORAL 2 TIMES DAILY WITH MEALS
Qty: 60 TABLET | Refills: 2 | Status: SHIPPED | OUTPATIENT
Start: 2024-09-06

## 2024-09-06 RX ORDER — SULFAMETHOXAZOLE/TRIMETHOPRIM 800-160 MG
1 TABLET ORAL 2 TIMES DAILY
Qty: 6 TABLET | Refills: 0 | Status: SHIPPED | OUTPATIENT
Start: 2024-09-06 | End: 2024-09-09

## 2024-09-07 LAB
BACTERIA UR CULT: ABNORMAL
ORGANISM: ABNORMAL

## 2024-09-11 ASSESSMENT — ENCOUNTER SYMPTOMS
CHEST TIGHTNESS: 0
COUGH: 0
CONSTIPATION: 0
BLOOD IN STOOL: 0
SHORTNESS OF BREATH: 0
DIARRHEA: 0

## 2024-10-01 DIAGNOSIS — R35.0 URINARY FREQUENCY: ICD-10-CM

## 2024-10-01 RX ORDER — SOLIFENACIN SUCCINATE 5 MG/1
5 TABLET, FILM COATED ORAL DAILY
Qty: 30 TABLET | Refills: 0 | Status: SHIPPED | OUTPATIENT
Start: 2024-10-01 | End: 2025-10-01

## 2024-10-01 NOTE — TELEPHONE ENCOUNTER
Patient sent a request for a refill, I tried calling to see if this med is working for her.  I assume since she requested the refill it might be workig

## 2024-10-08 ENCOUNTER — PATIENT MESSAGE (OUTPATIENT)
Dept: FAMILY MEDICINE CLINIC | Age: 79
End: 2024-10-08

## 2024-10-12 ENCOUNTER — PATIENT MESSAGE (OUTPATIENT)
Dept: FAMILY MEDICINE CLINIC | Age: 79
End: 2024-10-12

## 2024-10-28 RX ORDER — METOPROLOL SUCCINATE 100 MG/1
100 TABLET, EXTENDED RELEASE ORAL EVERY EVENING
Qty: 30 TABLET | Refills: 1 | Status: SHIPPED | OUTPATIENT
Start: 2024-10-28

## 2024-10-30 DIAGNOSIS — R35.0 URINARY FREQUENCY: ICD-10-CM

## 2024-10-30 RX ORDER — SOLIFENACIN SUCCINATE 5 MG/1
5 TABLET, FILM COATED ORAL DAILY
Qty: 30 TABLET | Refills: 5 | Status: SHIPPED | OUTPATIENT
Start: 2024-10-30 | End: 2025-10-30

## 2024-10-30 NOTE — TELEPHONE ENCOUNTER
Refill Request     CONFIRM preferrred pharmacy with the patient.    If Mail Order Rx - Pend for 90 day refill.      Last Seen: Last Seen Department: 9/5/2024  Last Seen by PCP: 9/5/2024    Last Written: 10-    If no future appointment scheduled, route STAFF MESSAGE with patient name to the  Pool for scheduling.      Next Appointment:   No future appointments.    Message sent to  to schedule appt with patient?  NO      Requested Prescriptions     Pending Prescriptions Disp Refills    solifenacin (VESICARE) 5 MG tablet 30 tablet 0     Sig: Take 1 tablet by mouth daily

## 2024-11-06 DIAGNOSIS — Z79.899 MEDICATION MANAGEMENT: Primary | ICD-10-CM

## 2024-11-06 RX ORDER — DIGOXIN 125 MCG
125 TABLET ORAL DAILY
Qty: 30 TABLET | Refills: 0 | Status: SHIPPED | OUTPATIENT
Start: 2024-11-06

## 2024-11-06 NOTE — TELEPHONE ENCOUNTER
LOV 4/10/24  No dig level  Left message to call us to make appointment and needs dig level drawn.  I ordered this.

## 2024-11-15 ENCOUNTER — PATIENT MESSAGE (OUTPATIENT)
Dept: FAMILY MEDICINE CLINIC | Age: 79
End: 2024-11-15

## 2024-12-06 ENCOUNTER — PATIENT MESSAGE (OUTPATIENT)
Dept: FAMILY MEDICINE CLINIC | Age: 79
End: 2024-12-06

## 2024-12-06 DIAGNOSIS — M51.369 DEGENERATION OF INTERVERTEBRAL DISC OF LUMBAR REGION, UNSPECIFIED WHETHER PAIN PRESENT: Primary | ICD-10-CM

## 2024-12-06 RX ORDER — METHOCARBAMOL 500 MG/1
500 TABLET, FILM COATED ORAL 3 TIMES DAILY PRN
Qty: 90 TABLET | Refills: 5 | Status: SHIPPED | OUTPATIENT
Start: 2024-12-06

## 2024-12-07 DIAGNOSIS — M51.369 DEGENERATION OF INTERVERTEBRAL DISC OF LUMBAR REGION, UNSPECIFIED WHETHER PAIN PRESENT: ICD-10-CM

## 2024-12-09 RX ORDER — METHOCARBAMOL 500 MG/1
500 TABLET, FILM COATED ORAL 3 TIMES DAILY PRN
Qty: 90 TABLET | Refills: 5 | OUTPATIENT
Start: 2024-12-09

## 2024-12-26 RX ORDER — METOPROLOL SUCCINATE 100 MG/1
TABLET, EXTENDED RELEASE ORAL
Qty: 30 TABLET | Refills: 1 | OUTPATIENT
Start: 2024-12-26

## 2024-12-26 RX ORDER — METOPROLOL SUCCINATE 100 MG/1
TABLET, EXTENDED RELEASE ORAL
Qty: 30 TABLET | Refills: 1 | Status: SHIPPED | OUTPATIENT
Start: 2024-12-26

## 2025-02-27 RX ORDER — METOPROLOL SUCCINATE 100 MG/1
100 TABLET, EXTENDED RELEASE ORAL EVERY EVENING
Qty: 90 TABLET | Refills: 1 | Status: SHIPPED | OUTPATIENT
Start: 2025-02-27

## 2025-05-01 DIAGNOSIS — R35.0 URINARY FREQUENCY: ICD-10-CM

## 2025-05-01 DIAGNOSIS — E55.9 VITAMIN D DEFICIENCY: ICD-10-CM

## 2025-05-05 ENCOUNTER — HOSPITAL ENCOUNTER (EMERGENCY)
Age: 80
Discharge: HOME | End: 2025-05-05
Attending: STUDENT IN AN ORGANIZED HEALTH CARE EDUCATION/TRAINING PROGRAM
Payer: MEDICARE

## 2025-05-05 ENCOUNTER — HOSPITAL ENCOUNTER (OUTPATIENT)
Age: 80
Discharge: HOME | End: 2025-05-05
Payer: MEDICARE

## 2025-05-05 ENCOUNTER — APPOINTMENT (OUTPATIENT)
Dept: GENERAL RADIOLOGY | Age: 80
End: 2025-05-05
Payer: MEDICARE

## 2025-05-05 VITALS
OXYGEN SATURATION: 95 % | HEIGHT: 70 IN | RESPIRATION RATE: 16 BRPM | TEMPERATURE: 98.4 F | BODY MASS INDEX: 18.61 KG/M2 | HEART RATE: 98 BPM | WEIGHT: 130 LBS | DIASTOLIC BLOOD PRESSURE: 87 MMHG | SYSTOLIC BLOOD PRESSURE: 159 MMHG

## 2025-05-05 DIAGNOSIS — R00.2 PALPITATIONS: ICD-10-CM

## 2025-05-05 DIAGNOSIS — I48.91 ATRIAL FIBRILLATION, UNSPECIFIED TYPE (HCC): Primary | ICD-10-CM

## 2025-05-05 DIAGNOSIS — Z79.899 MEDICATION MANAGEMENT: ICD-10-CM

## 2025-05-05 LAB
ALBUMIN SERPL-MCNC: 3.4 G/DL (ref 3.4–5)
ALBUMIN/GLOB SERPL: 1 {RATIO} (ref 1.1–2.2)
ALP SERPL-CCNC: 139 U/L (ref 40–129)
ALT SERPL-CCNC: 14 U/L (ref 10–40)
ANION GAP SERPL CALCULATED.3IONS-SCNC: 16 MMOL/L (ref 3–16)
AST SERPL-CCNC: 20 U/L (ref 15–37)
BASOPHILS # BLD: 0.1 K/UL (ref 0–0.2)
BASOPHILS NFR BLD: 0.7 %
BILIRUB SERPL-MCNC: 0.5 MG/DL (ref 0–1)
BUN SERPL-MCNC: 24 MG/DL (ref 7–20)
CALCIUM SERPL-MCNC: 10 MG/DL (ref 8.3–10.6)
CHLORIDE SERPL-SCNC: 101 MMOL/L (ref 99–110)
CO2 SERPL-SCNC: 22 MMOL/L (ref 21–32)
CREAT SERPL-MCNC: 0.9 MG/DL (ref 0.6–1.2)
DEPRECATED RDW RBC AUTO: 14.5 % (ref 12.4–15.4)
DIGOXIN SERPL-MCNC: <0.3 NG/ML (ref 0.8–2)
EOSINOPHIL # BLD: 0 K/UL (ref 0–0.6)
EOSINOPHIL NFR BLD: 0.4 %
GFR SERPLBLD CREATININE-BSD FMLA CKD-EPI: 66 ML/MIN/{1.73_M2}
GLUCOSE SERPL-MCNC: 254 MG/DL (ref 70–99)
HCT VFR BLD AUTO: 36.4 % (ref 36–48)
HGB BLD-MCNC: 12.1 G/DL (ref 12–16)
INR PPP: 1.1 (ref 0.85–1.15)
LYMPHOCYTES # BLD: 2.2 K/UL (ref 1–5.1)
LYMPHOCYTES NFR BLD: 20.6 %
MCH RBC QN AUTO: 28.4 PG (ref 26–34)
MCHC RBC AUTO-ENTMCNC: 33.2 G/DL (ref 31–36)
MCV RBC AUTO: 85.4 FL (ref 80–100)
MONOCYTES # BLD: 0.9 K/UL (ref 0–1.3)
MONOCYTES NFR BLD: 8.5 %
NEUTROPHILS # BLD: 7.5 K/UL (ref 1.7–7.7)
NEUTROPHILS NFR BLD: 69.8 %
NT-PROBNP SERPL-MCNC: 1147 PG/ML (ref 0–449)
PLATELET # BLD AUTO: 286 K/UL (ref 135–450)
PMV BLD AUTO: 8 FL (ref 5–10.5)
POTASSIUM SERPL-SCNC: 3.6 MMOL/L (ref 3.5–5.1)
PROT SERPL-MCNC: 6.9 G/DL (ref 6.4–8.2)
PROTHROMBIN TIME: 14.4 SEC (ref 11.9–14.9)
RBC # BLD AUTO: 4.26 M/UL (ref 4–5.2)
SODIUM SERPL-SCNC: 139 MMOL/L (ref 136–145)
TROPONIN, HIGH SENSITIVITY: 9 NG/L (ref 0–14)
TSH SERPL DL<=0.005 MIU/L-ACNC: 0.91 UIU/ML (ref 0.27–4.2)
WBC # BLD AUTO: 10.8 K/UL (ref 4–11)

## 2025-05-05 PROCEDURE — 80162 ASSAY OF DIGOXIN TOTAL: CPT

## 2025-05-05 PROCEDURE — 83880 ASSAY OF NATRIURETIC PEPTIDE: CPT

## 2025-05-05 PROCEDURE — 6370000000 HC RX 637 (ALT 250 FOR IP): Performed by: STUDENT IN AN ORGANIZED HEALTH CARE EDUCATION/TRAINING PROGRAM

## 2025-05-05 PROCEDURE — 36415 COLL VENOUS BLD VENIPUNCTURE: CPT

## 2025-05-05 PROCEDURE — 85610 PROTHROMBIN TIME: CPT

## 2025-05-05 PROCEDURE — 71046 X-RAY EXAM CHEST 2 VIEWS: CPT

## 2025-05-05 PROCEDURE — 80053 COMPREHEN METABOLIC PANEL: CPT

## 2025-05-05 PROCEDURE — 99285 EMERGENCY DEPT VISIT HI MDM: CPT

## 2025-05-05 PROCEDURE — 93005 ELECTROCARDIOGRAM TRACING: CPT | Performed by: STUDENT IN AN ORGANIZED HEALTH CARE EDUCATION/TRAINING PROGRAM

## 2025-05-05 PROCEDURE — 96374 THER/PROPH/DIAG INJ IV PUSH: CPT

## 2025-05-05 PROCEDURE — 2500000003 HC RX 250 WO HCPCS: Performed by: STUDENT IN AN ORGANIZED HEALTH CARE EDUCATION/TRAINING PROGRAM

## 2025-05-05 PROCEDURE — 85025 COMPLETE CBC W/AUTO DIFF WBC: CPT

## 2025-05-05 PROCEDURE — 84443 ASSAY THYROID STIM HORMONE: CPT

## 2025-05-05 PROCEDURE — 84484 ASSAY OF TROPONIN QUANT: CPT

## 2025-05-05 RX ORDER — METOPROLOL TARTRATE 1 MG/ML
5 INJECTION, SOLUTION INTRAVENOUS EVERY 5 MIN PRN
Status: DISCONTINUED | OUTPATIENT
Start: 2025-05-05 | End: 2025-05-05

## 2025-05-05 RX ORDER — DIGOXIN 125 MCG
125 TABLET ORAL ONCE
Status: COMPLETED | OUTPATIENT
Start: 2025-05-05 | End: 2025-05-05

## 2025-05-05 RX ORDER — DIGOXIN 125 MCG
125 TABLET ORAL DAILY
Qty: 30 TABLET | Refills: 0 | Status: SHIPPED | OUTPATIENT
Start: 2025-05-05 | End: 2025-06-04

## 2025-05-05 RX ORDER — SOLIFENACIN SUCCINATE 5 MG/1
5 TABLET, FILM COATED ORAL DAILY
Qty: 30 TABLET | Refills: 5 | OUTPATIENT
Start: 2025-05-05 | End: 2026-05-05

## 2025-05-05 RX ORDER — DIGOXIN 125 MCG
125 TABLET ORAL DAILY
Qty: 90 TABLET | Refills: 1 | Status: SHIPPED | OUTPATIENT
Start: 2025-05-05 | End: 2025-05-05

## 2025-05-05 RX ORDER — DIGOXIN 0.25 MG/ML
250 INJECTION INTRAMUSCULAR; INTRAVENOUS DAILY
Status: DISCONTINUED | OUTPATIENT
Start: 2025-05-05 | End: 2025-05-05

## 2025-05-05 RX ORDER — ACETAMINOPHEN 160 MG
TABLET,DISINTEGRATING ORAL
Qty: 30 CAPSULE | Refills: 5 | OUTPATIENT
Start: 2025-05-05

## 2025-05-05 RX ADMIN — METOPROLOL TARTRATE 5 MG: 5 INJECTION INTRAVENOUS at 15:55

## 2025-05-05 RX ADMIN — DIGOXIN 125 MCG: 125 TABLET ORAL at 17:31

## 2025-05-05 RX ADMIN — METOPROLOL TARTRATE 5 MG: 5 INJECTION INTRAVENOUS at 16:15

## 2025-05-05 ASSESSMENT — PAIN - FUNCTIONAL ASSESSMENT: PAIN_FUNCTIONAL_ASSESSMENT: NONE - DENIES PAIN

## 2025-05-05 NOTE — DISCHARGE INSTRUCTIONS
You were evaluated in the emergency department for palpitations in the context of atrial fibrillation. Assessments and testing completed during your visit were reassuring and at this time there is no indication for further testing, treatment or admission to the hospital. Given this it is appropriate to discharge you from the emergency department. At the time of discharge we discussed the following:    Please start the digoxin tomorrow and follow-up with Dr. Cohen for further recommendations including repeat testing of your digoxin level.    Please note that sometimes it is difficult to diagnose a medical condition early in the disease process before the disease is fully manifest. Because of this, should you develop any new or worsening symptoms, you may return at any time to the emergency department for another evaluation. If available you are also recommended to review this visit with your primary care physician or other medical provider in the next 7 days. Thank you for allowing us to care for you today.

## 2025-05-05 NOTE — ED PROVIDER NOTES
[facility] EMERGENCY DEPARTMENT     EMERGENCY DEPARTMENT ENCOUNTER         Pt Name: Saundra Maravilla   MRN: [id number]   Birthdate 1945   Date of evaluation: 5/5/2025   Provider: Marcial Kellogg MD   PCP: Mandy Mabry APRN - CNP   Note Started: 3:46 PM EDT 5/5/25       Chief Complaint     Palpitations (Pt arrives from home with flutter in chest x1 week)      History of Present Illness     Saundra Maravilla is a 79 y.o. female who presents with palpitations.  The patient suffers atrial fibrillation on anticoagulation and taking metoprolol and digoxin for rate control.  She follows closely with her cardiologist reportedly was at the hospital today for a routine blood draw and as she had been experiencing some chest fluttering for greater than 1 week she presents subsequently to the emerge department for evaluation.  She has no other acute complaints.  She denies chest pain shortness of breath no weakness.  She has otherwise been in in baseline health      Review of Systems     Positives and pertinent negatives as per HPI.    Past Medical, Surgical, Family, and Social History     She has a past medical history of Anemia, Arthritis, Atrial fibrillation (HCC), Back pain, Diabetes mellitus (HCC), Hyperlipidemia, Hypertension, and Total hip replacement.  She has a past surgical history that includes Cholecystectomy; Sinus endoscopy; Nasal septum surgery; Dental surgery; Tubal ligation; Tonsillectomy; joint replacement (08/10/2011); Colonoscopy (2007); Colonoscopy (08/16/2017); Cardioversion; Cataract removal; Cardioversion (07/05/2023); and Cardioversion (N/A, 7/5/2023).  Her family history includes Cancer in her father and mother.  She reports that she quit smoking about 17 years ago. Her smoking use included cigarettes. She started smoking about 61 years ago. She has a 22 pack-year smoking history. She has never used smokeless tobacco. She reports that she does not drink alcohol and does not  patient was given the followingmedications:  Orders Placed This Encounter   Medications    DISCONTD: metoprolol (LOPRESSOR) injection 5 mg    digoxin (LANOXIN) tablet 125 mcg    digoxin (LANOXIN) 125 MCG tablet     Sig: Take 1 tablet by mouth daily     Dispense:  30 tablet     Refill:  0     Needs appointment    DISCONTD: digoxin (LANOXIN) injection 250 mcg       CONSULTS:  IP CONSULT TO CARDIOLOGY      CRITICAL CARE TIME   Total Critical Care time was 20 minutes, excluding separately reportable procedures in the context of the following condition A-fib RVR.  There was a high probability of clinically significant/life threatening deterioration in the patient's condition which required my urgent intervention.    Clinical Impression     1. Atrial fibrillation, unspecified type (HCC)    2. Palpitations        Disposition     PATIENT REFERRED TO:  No follow-up provider specified.    DISCHARGE MEDICATIONS:  Discharge Medication List as of 5/5/2025  5:38 PM          DISPOSITION Decision To Discharge 05/05/2025 04:42:22 PM   DISPOSITION CONDITION Stable           Marcial Kellogg MD (electronically signed)  Attending Emergency Physician    Please note this documentation has been produced using speech recognition software and may contain errors related to that system including errors in grammar, punctuation, and spelling, as well as words and phrases that may be inappropriate.  Efforts were made to edit the dictations.         Marcial Kellogg MD  05/05/25 [id number]       Marcial Kellogg MD  05/05/25 [id number]

## 2025-05-05 NOTE — ED NOTES
[id number] - Call placed to Cardiology for consult     [id number] - Dr. Cohen called and spoke with Dr. Kellogg at this time.

## 2025-05-06 ENCOUNTER — CARE COORDINATION (OUTPATIENT)
Dept: CARE COORDINATION | Age: 80
End: 2025-05-06

## 2025-05-06 LAB
DIGOXIN SERPL-MCNC: <0.3 NG/ML (ref 0.8–2)
EKG DIAGNOSIS: NORMAL
EKG Q-T INTERVAL: 348 MS
EKG QRS DURATION: 82 MS
EKG QTC CALCULATION (BAZETT): 502 MS
EKG R AXIS: 53 DEGREES
EKG T AXIS: 61 DEGREES
EKG VENTRICULAR RATE: 125 BPM

## 2025-05-06 PROCEDURE — 93010 ELECTROCARDIOGRAM REPORT: CPT | Performed by: INTERNAL MEDICINE

## 2025-05-06 NOTE — CARE COORDINATION
Ambulatory Care Coordination Note     5/6/2025      Patient outreach attempt by this ACM today to offer care management services. ACM was unable to reach the patient by telephone today;   left voice message requesting a return phone call to this ACM.     ACM: Eugenia Bourne RN     PCP/Specialist follow up:   Future Appointments         Provider Specialty Dept Phone    10/13/2025 1:30 PM Zack Cohen MD Cardiology [phone]            Follow Up:   Plan for next AC outreach in approximately 1-2 days  to complete:  - outreach attempt to offer care management services.

## 2025-05-08 ENCOUNTER — PATIENT MESSAGE (OUTPATIENT)
Dept: FAMILY MEDICINE CLINIC | Age: 80
End: 2025-05-08

## 2025-05-08 ENCOUNTER — CARE COORDINATION (OUTPATIENT)
Dept: CARE COORDINATION | Age: 80
End: 2025-05-08

## 2025-05-08 DIAGNOSIS — R35.0 URINARY FREQUENCY: ICD-10-CM

## 2025-05-08 RX ORDER — ACETAMINOPHEN 160 MG
2000 TABLET,DISINTEGRATING ORAL DAILY
Qty: 30 CAPSULE | Refills: 5 | Status: SHIPPED | OUTPATIENT
Start: 2025-05-08

## 2025-05-08 NOTE — CARE COORDINATION
Ambulatory Care Coordination Note     5/8/2025      Patient outreach attempt by this AC today to perform care management follow up  and offer care management services. [facility] was unable to reach the patient by telephone today;   left voice message requesting a return phone call to this ACM.     ACM: Eugenia Bourne RN     PCP/Specialist follow up:   Future Appointments         Provider Specialty Dept Phone    5/16/2025 10:00 AM Mandy Mabry APRN - CNP Family Medicine [phone]    10/13/2025 1:30 PM Zack Cohen MD Cardiology [phone]            Follow Up:   Plan for next AC outreach in approximately 1 week to complete:  - outreach attempt to offer care management services.

## 2025-05-09 DIAGNOSIS — K21.9 GASTROESOPHAGEAL REFLUX DISEASE WITHOUT ESOPHAGITIS: ICD-10-CM

## 2025-05-09 DIAGNOSIS — E78.00 PURE HYPERCHOLESTEROLEMIA: ICD-10-CM

## 2025-05-09 RX ORDER — OMEPRAZOLE 40 MG/1
40 CAPSULE, DELAYED RELEASE ORAL DAILY
Qty: 30 CAPSULE | Refills: 3 | Status: SHIPPED | OUTPATIENT
Start: 2025-05-09

## 2025-05-09 RX ORDER — ATORVASTATIN CALCIUM 40 MG/1
40 TABLET, FILM COATED ORAL DAILY
Qty: 90 TABLET | Refills: 3 | Status: SHIPPED | OUTPATIENT
Start: 2025-05-09

## 2025-05-09 RX ORDER — SOLIFENACIN SUCCINATE 5 MG/1
5 TABLET, FILM COATED ORAL DAILY
Qty: 30 TABLET | Refills: 5 | Status: SHIPPED | OUTPATIENT
Start: 2025-05-09 | End: 2026-05-09

## 2025-05-13 ENCOUNTER — PATIENT MESSAGE (OUTPATIENT)
Dept: FAMILY MEDICINE CLINIC | Age: 80
End: 2025-05-13

## 2025-05-15 ENCOUNTER — CARE COORDINATION (OUTPATIENT)
Dept: CARE COORDINATION | Age: 80
End: 2025-05-15

## 2025-05-15 NOTE — CARE COORDINATION
Ambulatory Care Coordination Note     5/15/2025      Patient outreach attempt by this ACM today to offer care management services. ACM was unable to reach the patient by telephone today;   left voice message requesting a return phone call to this ACM.     ACM: Eugenia Bourne RN     Care Summary Note:   Noted patient left voice mail on this ACM's phone requesting a return call tomorrow  This ACM will be out of the office tomorrow and not returning until 5/20/25  This ACM attempted to return patient's call today; left message notifying patient this ACM will try again next week.  Instructed patient to contact PCP office for any urgent needs    PCP/Specialist follow up:   Future Appointments         Provider Specialty Dept Phone    10/13/2025 1:30 PM Zack Cohen MD Cardiology [phone]            Follow Up:   Plan for next ACM outreach in approximately 1 week to complete:  - outreach attempt to offer care management services.

## 2025-05-15 NOTE — CARE COORDINATION
Ambulatory Care Coordination Note     5/15/2025      Patient outreach attempt by this ACM today to offer care management services. ACM was unable to reach the patient by telephone today;   left voice message requesting a return phone call to this ACM.  [employer] message sent requesting patient to contact this ACM.     ACM: Eugenia Bourne RN     PCP/Specialist follow up:   Future Appointments         Provider Specialty Dept Phone    5/16/2025 10:00 AM Mandy Mabry APRN - CNP Family Medicine [phone]    10/13/2025 1:30 PM Zack Cohen MD Cardiology [phone]          No further outreach scheduled with this AC; episode of care resolved

## 2025-05-28 SDOH — HEALTH STABILITY: PHYSICAL HEALTH: ON AVERAGE, HOW MANY DAYS PER WEEK DO YOU ENGAGE IN MODERATE TO STRENUOUS EXERCISE (LIKE A BRISK WALK)?: 0 DAYS

## 2025-05-28 SDOH — ECONOMIC STABILITY: FOOD INSECURITY: WITHIN THE PAST 12 MONTHS, THE FOOD YOU BOUGHT JUST DIDN'T LAST AND YOU DIDN'T HAVE MONEY TO GET MORE.: PATIENT DECLINED

## 2025-05-28 SDOH — ECONOMIC STABILITY: FOOD INSECURITY: WITHIN THE PAST 12 MONTHS, YOU WORRIED THAT YOUR FOOD WOULD RUN OUT BEFORE YOU GOT MONEY TO BUY MORE.: PATIENT DECLINED

## 2025-05-28 SDOH — ECONOMIC STABILITY: INCOME INSECURITY: IN THE LAST 12 MONTHS, WAS THERE A TIME WHEN YOU WERE NOT ABLE TO PAY THE MORTGAGE OR RENT ON TIME?: NO

## 2025-05-28 SDOH — ECONOMIC STABILITY: TRANSPORTATION INSECURITY
IN THE PAST 12 MONTHS, HAS THE LACK OF TRANSPORTATION KEPT YOU FROM MEDICAL APPOINTMENTS OR FROM GETTING MEDICATIONS?: YES

## 2025-05-28 SDOH — ECONOMIC STABILITY: TRANSPORTATION INSECURITY
IN THE PAST 12 MONTHS, HAS LACK OF TRANSPORTATION KEPT YOU FROM MEETINGS, WORK, OR FROM GETTING THINGS NEEDED FOR DAILY LIVING?: YES

## 2025-05-28 ASSESSMENT — PATIENT HEALTH QUESTIONNAIRE - PHQ9
SUM OF ALL RESPONSES TO PHQ QUESTIONS 1-9: 2
SUM OF ALL RESPONSES TO PHQ QUESTIONS 1-9: 2
2. FEELING DOWN, DEPRESSED OR HOPELESS: SEVERAL DAYS
1. LITTLE INTEREST OR PLEASURE IN DOING THINGS: SEVERAL DAYS
SUM OF ALL RESPONSES TO PHQ QUESTIONS 1-9: 2
SUM OF ALL RESPONSES TO PHQ QUESTIONS 1-9: 2

## 2025-05-28 ASSESSMENT — LIFESTYLE VARIABLES
HOW MANY STANDARD DRINKS CONTAINING ALCOHOL DO YOU HAVE ON A TYPICAL DAY: 0
HOW OFTEN DO YOU HAVE A DRINK CONTAINING ALCOHOL: NEVER
HOW OFTEN DO YOU HAVE A DRINK CONTAINING ALCOHOL: 1
HOW MANY STANDARD DRINKS CONTAINING ALCOHOL DO YOU HAVE ON A TYPICAL DAY: PATIENT DOES NOT DRINK
HOW OFTEN DO YOU HAVE SIX OR MORE DRINKS ON ONE OCCASION: 1

## 2025-05-29 ENCOUNTER — OFFICE VISIT (OUTPATIENT)
Dept: FAMILY MEDICINE CLINIC | Age: 80
End: 2025-05-29
Payer: MEDICARE

## 2025-05-29 VITALS
DIASTOLIC BLOOD PRESSURE: 72 MMHG | OXYGEN SATURATION: 97 % | WEIGHT: 129 LBS | SYSTOLIC BLOOD PRESSURE: 122 MMHG | TEMPERATURE: 98 F | HEART RATE: 74 BPM | BODY MASS INDEX: 18.51 KG/M2

## 2025-05-29 DIAGNOSIS — J40 BRONCHITIS: ICD-10-CM

## 2025-05-29 DIAGNOSIS — Z00.00 MEDICARE ANNUAL WELLNESS VISIT, SUBSEQUENT: Primary | ICD-10-CM

## 2025-05-29 DIAGNOSIS — J43.2 CENTRILOBULAR EMPHYSEMA (HCC): ICD-10-CM

## 2025-05-29 DIAGNOSIS — J32.1 CHRONIC FRONTAL SINUSITIS: ICD-10-CM

## 2025-05-29 DIAGNOSIS — N18.31 TYPE 2 DIABETES MELLITUS WITH STAGE 3A CHRONIC KIDNEY DISEASE, WITHOUT LONG-TERM CURRENT USE OF INSULIN (HCC): ICD-10-CM

## 2025-05-29 DIAGNOSIS — E11.22 TYPE 2 DIABETES MELLITUS WITH STAGE 3A CHRONIC KIDNEY DISEASE, WITHOUT LONG-TERM CURRENT USE OF INSULIN (HCC): ICD-10-CM

## 2025-05-29 LAB — HBA1C MFR BLD: 6.7 %

## 2025-05-29 PROCEDURE — 3078F DIAST BP <80 MM HG: CPT | Performed by: NURSE PRACTITIONER

## 2025-05-29 PROCEDURE — 3044F HG A1C LEVEL LT 7.0%: CPT | Performed by: NURSE PRACTITIONER

## 2025-05-29 PROCEDURE — 1123F ACP DISCUSS/DSCN MKR DOCD: CPT | Performed by: NURSE PRACTITIONER

## 2025-05-29 PROCEDURE — 83036 HEMOGLOBIN GLYCOSYLATED A1C: CPT | Performed by: NURSE PRACTITIONER

## 2025-05-29 PROCEDURE — G0439 PPPS, SUBSEQ VISIT: HCPCS | Performed by: NURSE PRACTITIONER

## 2025-05-29 PROCEDURE — 3074F SYST BP LT 130 MM HG: CPT | Performed by: NURSE PRACTITIONER

## 2025-05-29 PROCEDURE — 1159F MED LIST DOCD IN RCRD: CPT | Performed by: NURSE PRACTITIONER

## 2025-05-29 RX ORDER — METHYLPREDNISOLONE 4 MG/1
TABLET ORAL
Qty: 1 KIT | Refills: 0 | Status: SHIPPED | OUTPATIENT
Start: 2025-05-29 | End: 2025-06-04

## 2025-05-29 RX ORDER — ALBUTEROL SULFATE 90 UG/1
2 INHALANT RESPIRATORY (INHALATION) EVERY 6 HOURS PRN
Qty: 18 G | Refills: 3 | Status: SHIPPED | OUTPATIENT
Start: 2025-05-29

## 2025-05-29 NOTE — PROGRESS NOTES
5/29/2025    Chief Complaint   Patient presents with    Medicare AWV    Shortness of Breath     Uses the inhaler for this     Nasal Congestion       Saundra Maravilla is a 79 y.o. female, presents today for:      ASSESSMENT/PLAN:    1. Medicare annual wellness visit, subsequent  AWV completed today, see documentation below.      2. Chronic frontal sinusitis  Recurrent nasal congestion, cough, shortness of breath for the past 6 months.  Suspicious for frontal sinusitis vs emphysema vs bronchitis.  Start Medrol Dose Willie and Augmentin for now.  Will send Stiolto for new diagnosis of emphysema.  Consider ENT referral    3. Bronchitis  See notes above.    - methylPREDNISolone (MEDROL, WILLIE,) 4 MG tablet; Take by mouth.  Dispense: 1 kit; Refill: 0  - amoxicillin-clavulanate (AUGMENTIN) 875-125 MG per tablet; Take 1 tablet by mouth 2 times daily for 7 days  Dispense: 14 tablet; Refill: 0    4. Centrilobular emphysema (HCC)  New diagnosis 5/5/25  Start Stiolto as Anoro likely not on formulary.  Discussed risks, benefits, side effects.    Consider Pulmonary referral in future.  Declining CT for now.    - tiotropium-olodaterol (STIOLTO RESPIMAT) 2.5-2.5 MCG/ACT AERS; Inhale 2 puffs into the lungs daily Emphysema  Dispense: 1 each; Refill: 1  - albuterol sulfate HFA (PROVENTIL HFA) 108 (90 Base) MCG/ACT inhaler; Inhale 2 puffs into the lungs every 6 hours as needed for Wheezing or Shortness of Breath  Dispense: 18 g; Refill: 3    5. Type 2 diabetes mellitus with stage 3a chronic kidney disease, without long-term current use of insulin (HCC)  Controlled today, A1C 6.7%.   Diet controlled  Updated labs today  No ACE/ARB- defer to Cardio  Continue Atorvastatin 40  - POCT glycosylated hemoglobin (Hb A1C)      Return in about 3 months (around 8/29/2025).      Presenting today for emphysema and AWV.   For the past 4 months has been having dizziness, weakness, intermittent fevers, congestion, rhinorrhea, weak, fever, congestion.

## 2025-05-30 ENCOUNTER — RESULTS FOLLOW-UP (OUTPATIENT)
Dept: FAMILY MEDICINE CLINIC | Age: 80
End: 2025-05-30

## 2025-07-29 DIAGNOSIS — I48.0 PAROXYSMAL ATRIAL FIBRILLATION (HCC): ICD-10-CM

## 2025-07-29 RX ORDER — APIXABAN 5 MG/1
5 TABLET, FILM COATED ORAL 2 TIMES DAILY
Qty: 180 TABLET | Refills: 0 | Status: SHIPPED | OUTPATIENT
Start: 2025-07-29

## 2025-07-29 NOTE — TELEPHONE ENCOUNTER
Last Office Visit: 4/10/2024 Provider: DELFIN  **Is provider OOT? No    Next Office Visit: 10/13/2025 Provider: DELFIN    **Has patient already had 30 day supply? No    LAST LABS:   CBC:  Lab Results   Component Value Date    WBC 10.8 05/05/2025    HGB 12.1 05/05/2025    HCT 36.4 05/05/2025    MCV 85.4 05/05/2025     05/05/2025    LYMPHOPCT 20.6 05/05/2025    RBC 4.26 05/05/2025    MCH 28.4 05/05/2025    MCHC 33.2 05/05/2025    RDW 14.5 05/05/2025           BMP:  Lab Results   Component Value Date/Time     05/05/2025 03:20 PM    K 3.6 05/05/2025 03:20 PM     05/05/2025 03:20 PM    CO2 22 05/05/2025 03:20 PM    BUN 24 05/05/2025 03:20 PM    CREATININE 0.9 05/05/2025 03:20 PM    GLUCOSE 254 05/05/2025 03:20 PM    CALCIUM 10.0 05/05/2025 03:20 PM    LABGLOM 66 05/05/2025 03:20 PM    LABGLOM 58 11/17/2023 01:33 PM      Lab orders needed? no

## 2025-08-05 DIAGNOSIS — F41.9 ANXIETY: ICD-10-CM

## 2025-08-05 RX ORDER — FLUOXETINE HYDROCHLORIDE 40 MG/1
40 CAPSULE ORAL DAILY
Qty: 30 CAPSULE | Refills: 1 | Status: SHIPPED | OUTPATIENT
Start: 2025-08-05

## 2025-09-03 RX ORDER — METOPROLOL SUCCINATE 100 MG/1
100 TABLET, EXTENDED RELEASE ORAL EVERY EVENING
Qty: 90 TABLET | Refills: 1 | Status: SHIPPED | OUTPATIENT
Start: 2025-09-03

## (undated) DEVICE — PAD, DEFIB, ADULT, RADIOTRANS, ZOLL: Brand: MEDLINE